# Patient Record
Sex: FEMALE | Race: WHITE | NOT HISPANIC OR LATINO | Employment: OTHER | URBAN - METROPOLITAN AREA
[De-identification: names, ages, dates, MRNs, and addresses within clinical notes are randomized per-mention and may not be internally consistent; named-entity substitution may affect disease eponyms.]

---

## 2017-01-09 ENCOUNTER — TRANSCRIBE ORDERS (OUTPATIENT)
Dept: LAB | Facility: CLINIC | Age: 67
End: 2017-01-09

## 2017-01-09 ENCOUNTER — APPOINTMENT (OUTPATIENT)
Dept: LAB | Facility: CLINIC | Age: 67
End: 2017-01-09
Payer: COMMERCIAL

## 2017-01-09 DIAGNOSIS — E78.5 HYPERLIPIDEMIA, UNSPECIFIED HYPERLIPIDEMIA TYPE: ICD-10-CM

## 2017-01-09 DIAGNOSIS — E55.9 VITAMIN D DEFICIENCY, UNSPECIFIED: ICD-10-CM

## 2017-01-09 DIAGNOSIS — I10 UNSPECIFIED ESSENTIAL HYPERTENSION: ICD-10-CM

## 2017-01-09 DIAGNOSIS — I10 UNSPECIFIED ESSENTIAL HYPERTENSION: Primary | ICD-10-CM

## 2017-01-09 LAB
25(OH)D3 SERPL-MCNC: 13.9 NG/ML (ref 30–100)
ALBUMIN SERPL BCP-MCNC: 3.8 G/DL (ref 3.5–5)
ALP SERPL-CCNC: 81 U/L (ref 46–116)
ALT SERPL W P-5'-P-CCNC: 27 U/L (ref 12–78)
ANION GAP SERPL CALCULATED.3IONS-SCNC: 8 MMOL/L (ref 4–13)
AST SERPL W P-5'-P-CCNC: 15 U/L (ref 5–45)
BILIRUB SERPL-MCNC: 0.58 MG/DL (ref 0.2–1)
BUN SERPL-MCNC: 15 MG/DL (ref 5–25)
CALCIUM SERPL-MCNC: 8.8 MG/DL (ref 8.3–10.1)
CHLORIDE SERPL-SCNC: 107 MMOL/L (ref 100–108)
CHOLEST SERPL-MCNC: 331 MG/DL (ref 50–200)
CO2 SERPL-SCNC: 28 MMOL/L (ref 21–32)
CREAT SERPL-MCNC: 0.74 MG/DL (ref 0.6–1.3)
ERYTHROCYTE [DISTWIDTH] IN BLOOD BY AUTOMATED COUNT: 12.7 % (ref 11.6–15.1)
GFR SERPL CREATININE-BSD FRML MDRD: >60 ML/MIN/1.73SQ M
GLUCOSE SERPL-MCNC: 87 MG/DL (ref 65–140)
HCT VFR BLD AUTO: 43.4 % (ref 34.8–46.1)
HDLC SERPL-MCNC: 48 MG/DL (ref 40–60)
HGB BLD-MCNC: 14.1 G/DL (ref 11.5–15.4)
LDLC SERPL CALC-MCNC: 254 MG/DL (ref 0–100)
MCH RBC QN AUTO: 30.3 PG (ref 26.8–34.3)
MCHC RBC AUTO-ENTMCNC: 32.5 G/DL (ref 31.4–37.4)
MCV RBC AUTO: 93 FL (ref 82–98)
PLATELET # BLD AUTO: 266 THOUSANDS/UL (ref 149–390)
PMV BLD AUTO: 11.1 FL (ref 8.9–12.7)
POTASSIUM SERPL-SCNC: 4.3 MMOL/L (ref 3.5–5.3)
PROT SERPL-MCNC: 7.3 G/DL (ref 6.4–8.2)
RBC # BLD AUTO: 4.66 MILLION/UL (ref 3.81–5.12)
SODIUM SERPL-SCNC: 143 MMOL/L (ref 136–145)
TRIGL SERPL-MCNC: 145 MG/DL
TSH SERPL DL<=0.05 MIU/L-ACNC: 3.15 UIU/ML (ref 0.36–3.74)
WBC # BLD AUTO: 6.8 THOUSAND/UL (ref 4.31–10.16)

## 2017-01-09 PROCEDURE — 80061 LIPID PANEL: CPT

## 2017-01-09 PROCEDURE — 84443 ASSAY THYROID STIM HORMONE: CPT

## 2017-01-09 PROCEDURE — 80053 COMPREHEN METABOLIC PANEL: CPT

## 2017-01-09 PROCEDURE — 36415 COLL VENOUS BLD VENIPUNCTURE: CPT

## 2017-01-09 PROCEDURE — 82306 VITAMIN D 25 HYDROXY: CPT

## 2017-01-09 PROCEDURE — 85027 COMPLETE CBC AUTOMATED: CPT

## 2017-01-13 ENCOUNTER — ALLSCRIPTS OFFICE VISIT (OUTPATIENT)
Dept: OTHER | Facility: OTHER | Age: 67
End: 2017-01-13

## 2017-04-19 ENCOUNTER — APPOINTMENT (OUTPATIENT)
Dept: LAB | Facility: CLINIC | Age: 67
End: 2017-04-19
Payer: COMMERCIAL

## 2017-04-19 ENCOUNTER — TRANSCRIBE ORDERS (OUTPATIENT)
Dept: LAB | Facility: CLINIC | Age: 67
End: 2017-04-19

## 2017-04-19 DIAGNOSIS — E78.5 OTHER AND UNSPECIFIED HYPERLIPIDEMIA: ICD-10-CM

## 2017-04-19 DIAGNOSIS — M25.562 LEFT KNEE PAIN, UNSPECIFIED CHRONICITY: Primary | ICD-10-CM

## 2017-04-19 LAB
CHOLEST SERPL-MCNC: 279 MG/DL (ref 50–200)
HDLC SERPL-MCNC: 54 MG/DL (ref 40–60)
LDLC SERPL CALC-MCNC: 206 MG/DL (ref 0–100)
TRIGL SERPL-MCNC: 93 MG/DL

## 2017-04-19 PROCEDURE — 36415 COLL VENOUS BLD VENIPUNCTURE: CPT

## 2017-04-19 PROCEDURE — 80061 LIPID PANEL: CPT

## 2017-04-20 ENCOUNTER — GENERIC CONVERSION - ENCOUNTER (OUTPATIENT)
Dept: OTHER | Facility: OTHER | Age: 67
End: 2017-04-20

## 2017-05-05 ENCOUNTER — ALLSCRIPTS OFFICE VISIT (OUTPATIENT)
Dept: OTHER | Facility: OTHER | Age: 67
End: 2017-05-05

## 2017-05-15 ENCOUNTER — TRANSCRIBE ORDERS (OUTPATIENT)
Dept: LAB | Facility: CLINIC | Age: 67
End: 2017-05-15

## 2017-05-15 ENCOUNTER — APPOINTMENT (OUTPATIENT)
Dept: LAB | Facility: CLINIC | Age: 67
End: 2017-05-15
Payer: COMMERCIAL

## 2017-05-15 DIAGNOSIS — E55.9 UNSPECIFIED VITAMIN D DEFICIENCY: ICD-10-CM

## 2017-05-15 DIAGNOSIS — E55.9 UNSPECIFIED VITAMIN D DEFICIENCY: Primary | ICD-10-CM

## 2017-05-15 LAB — 25(OH)D3 SERPL-MCNC: 29.1 NG/ML (ref 30–100)

## 2017-05-15 PROCEDURE — 36415 COLL VENOUS BLD VENIPUNCTURE: CPT

## 2017-05-15 PROCEDURE — 82306 VITAMIN D 25 HYDROXY: CPT

## 2017-05-17 ENCOUNTER — GENERIC CONVERSION - ENCOUNTER (OUTPATIENT)
Dept: OTHER | Facility: OTHER | Age: 67
End: 2017-05-17

## 2017-09-07 ENCOUNTER — TRANSCRIBE ORDERS (OUTPATIENT)
Dept: LAB | Facility: CLINIC | Age: 67
End: 2017-09-07

## 2017-09-07 ENCOUNTER — APPOINTMENT (OUTPATIENT)
Dept: LAB | Facility: CLINIC | Age: 67
End: 2017-09-07
Payer: MEDICARE

## 2017-09-07 DIAGNOSIS — E78.5 OTHER AND UNSPECIFIED HYPERLIPIDEMIA: ICD-10-CM

## 2017-09-07 DIAGNOSIS — E78.5 OTHER AND UNSPECIFIED HYPERLIPIDEMIA: Primary | ICD-10-CM

## 2017-09-07 LAB
ALBUMIN SERPL BCP-MCNC: 3.6 G/DL (ref 3.5–5)
ALP SERPL-CCNC: 81 U/L (ref 46–116)
ALT SERPL W P-5'-P-CCNC: 37 U/L (ref 12–78)
ANION GAP SERPL CALCULATED.3IONS-SCNC: 8 MMOL/L (ref 4–13)
AST SERPL W P-5'-P-CCNC: 22 U/L (ref 5–45)
BILIRUB SERPL-MCNC: 0.63 MG/DL (ref 0.2–1)
BUN SERPL-MCNC: 18 MG/DL (ref 5–25)
CALCIUM SERPL-MCNC: 8.8 MG/DL (ref 8.3–10.1)
CHLORIDE SERPL-SCNC: 105 MMOL/L (ref 100–108)
CHOLEST SERPL-MCNC: 247 MG/DL (ref 50–200)
CO2 SERPL-SCNC: 28 MMOL/L (ref 21–32)
CREAT SERPL-MCNC: 0.79 MG/DL (ref 0.6–1.3)
GFR SERPL CREATININE-BSD FRML MDRD: 79 ML/MIN/1.73SQ M
GLUCOSE P FAST SERPL-MCNC: 66 MG/DL (ref 65–99)
HDLC SERPL-MCNC: 58 MG/DL (ref 40–60)
LDLC SERPL CALC-MCNC: 170 MG/DL (ref 0–100)
POTASSIUM SERPL-SCNC: 4.3 MMOL/L (ref 3.5–5.3)
PROT SERPL-MCNC: 7.5 G/DL (ref 6.4–8.2)
SODIUM SERPL-SCNC: 141 MMOL/L (ref 136–145)
TRIGL SERPL-MCNC: 93 MG/DL

## 2017-09-07 PROCEDURE — 36415 COLL VENOUS BLD VENIPUNCTURE: CPT

## 2017-09-07 PROCEDURE — 80053 COMPREHEN METABOLIC PANEL: CPT

## 2017-09-07 PROCEDURE — 80061 LIPID PANEL: CPT

## 2017-09-08 ENCOUNTER — GENERIC CONVERSION - ENCOUNTER (OUTPATIENT)
Dept: OTHER | Facility: OTHER | Age: 67
End: 2017-09-08

## 2017-09-12 ENCOUNTER — ALLSCRIPTS OFFICE VISIT (OUTPATIENT)
Dept: OTHER | Facility: OTHER | Age: 67
End: 2017-09-12

## 2017-09-21 ENCOUNTER — TRANSCRIBE ORDERS (OUTPATIENT)
Dept: ADMINISTRATIVE | Facility: HOSPITAL | Age: 67
End: 2017-09-21

## 2017-09-21 DIAGNOSIS — M81.0 SENILE OSTEOPOROSIS: Primary | ICD-10-CM

## 2017-09-27 ENCOUNTER — HOSPITAL ENCOUNTER (OUTPATIENT)
Dept: RADIOLOGY | Age: 67
Discharge: HOME | End: 2017-09-27
Payer: MEDICARE

## 2017-09-27 DIAGNOSIS — M81.0 SENILE OSTEOPOROSIS: ICD-10-CM

## 2017-09-27 PROCEDURE — 77080 DXA BONE DENSITY AXIAL: CPT

## 2017-10-01 ENCOUNTER — GENERIC CONVERSION - ENCOUNTER (OUTPATIENT)
Dept: OTHER | Facility: OTHER | Age: 67
End: 2017-10-01

## 2017-10-23 ENCOUNTER — HOSPITAL ENCOUNTER (OUTPATIENT)
Dept: RADIOLOGY | Age: 67
Discharge: HOME | End: 2017-10-23
Payer: MEDICARE

## 2017-10-23 DIAGNOSIS — Z12.31 ENCOUNTER FOR SCREENING MAMMOGRAM FOR MALIGNANT NEOPLASM OF BREAST: ICD-10-CM

## 2017-10-23 PROCEDURE — G0202 SCR MAMMO BI INCL CAD: HCPCS

## 2018-01-10 NOTE — RESULT NOTES
Verified Results  (1) VITAMIN D 25-HYDROXY [id number] 08:28AM Nadege Brunson     Test Name Result Flag Reference   VIT D 25-HYDROX 29 1 ng/mL L 30 0-100 0   This assay is a certified procedure of the CDC Vitamin D Standardization Certification Program (VDSCP)     Deficiency <20ng/ml   Insufficiency 20-30ng/ml   Sufficient  ng/ml     *Patients undergoing fluorescein dye angiography may retain small amounts of fluorescein in the body for 48-72 hours post procedure  Samples containing fluorescein can produce falsely elevated Vitamin D values  If the patient had this procedure, a specimen should be resubmitted post fluorescein clearance

## 2018-01-10 NOTE — RESULT NOTES
Verified Results  (1) LIPID PANEL FASTING W DIRECT LDL REFLEX [id number] 07:07AM Nadege Brunson     Test Name Result Flag Reference   CHOLESTEROL 279 mg/dL H    LDL CHOLESTEROL CALCULATED 206 mg/dL H 0-100   This is a fasting blood test  Water,black tea or black  coffee only after 9:00pm the night before test  Drink 2 glasses of water the morning of test         Triglyceride:         Normal              <150 mg/dl       Borderline High    150-199 mg/dl       High               200-499 mg/dl       Very High          >499 mg/dl  Cholesterol:         Desirable        <200 mg/dl      Borderline High  200-239 mg/dl      High             >239 mg/dl  HDL Cholesterol:        High    >59 mg/dL      Low     <41 mg/dL  LDL Cholesterol:        Optimal          <100 mg/dl        Near Optimal     100-129 mg/dl        Above Optimal          Borderline High   130-159 mg/dl          High              160-189 mg/dl          Very High        >189 mg/dl  LDL CALCULATED:    This screening LDL is a calculated result  It does not have the accuracy of the Direct Measured LDL in the monitoring of patients with hyperlipidemia and/or statin therapy  Direct Measure LDL (HTT536) must be ordered separately in these patients  TRIGLYCERIDES 93 mg/dL  <=150   Specimen collection should occur prior to N-Acetylcysteine or Metamizole administration due to the potential for falsely depressed results  HDL,DIRECT 54 mg/dL  40-60   Specimen collection should occur prior to Metamizole administration due to the potential for falsely depressed results

## 2018-01-12 NOTE — MISCELLANEOUS
Message   Recorded as Task   Date: 04/20/2017 09:37 AM, Created By: Gregoria Dewitt   Task Name: Follow Up   Assigned To: Nadege Brunson   Regarding Patient: Cedric Lopez, Status: Active   Comment:    Sarah Ramirez - 20 Apr 2017 9:37 AM     TASK CREATED  Spoke to pt and gave results  Pt states that she has missed some doses of Zetia and is not interested in starting a statin at present  She will discuss further at appt with you    Thanks KAYY        Signatures   Electronically signed by : Britta Rinaldi MD; Apr 20 2017  9:41AM EST                       (Author)

## 2018-01-12 NOTE — RESULT NOTES
Verified Results  * DXA BONE DENSITY SPINE HIP AND PELVIS [id number] 06:36AM Nadege Brunson     Test Name Result Flag Reference   DXA BONE DENSITY SPINE HIP AND PELVIS (Report)     CENTRAL DXA SCAN     CLINICAL HISTORY:  74year old post-menopausal  female with history of melanoma  The patient walks and takes calcium and vitamin D supplements  TECHNIQUE: Bone densitometry was performed using a Hologic Horizon A bone densitometer  Regions of interest appear properly placed  There are no obvious fractures or other confounding variables which could limit the study  COMPARISON: None  RESULTS:    LUMBAR SPINE: L1-L4:   BMD 0 883 gm/cm2   T-score -1 5   Z-score 0 3     LEFT TOTAL HIP:   BMD 0 845 gm/cm2   T-score -0 8   Z-score 0 4     LEFT FEMORAL NECK:   BMD 0 698 gm/cm2   T-score -1 4   Z-score 0 2             IMPRESSION:   1  Based on the [facility] classification, the T-score of -1 5 in the lumbar spine is consistent with low bone mineral density  2  Any secondary causes of low bone mineral density should be excluded prior to treatment, if clinically indicated  3  A daily intake of at least 1200 mg calcium and 800 to 1000 IU of Vitamin D, as well as weight bearing and muscle strengthening exercise, fall prevention and avoidance of tobacco and excessive alcohol intake as basic preventive measures are suggested  4  Repeat DXA in 18 - 24 months, on the same machine, as clinically indicated  The 10 year risk of hip fracture is 0 7%, with the 10 year risk of major osteoporotic fracture being 8 2%, as calculated by the [facility] fracture risk assessment tool (FRAX)  The current NOF guidelines recommend treating patients with FRAX 10 year risk score   of >3% for hip fracture and >20% for major osteoporotic fracture        WHO CLASSIFICATION:   Normal (a T-score of -1 0 or higher)   Low bone mineral density (a T-score of less than -1 0 but higher than -2 5)   Osteoporosis (a T-score of -2 5 or less)   Severe osteoporosis (a T-score of -2 5 or less with a fragility fracture)             Workstation performed: [id number]     Signed by:   Gino Ty MD   9/27/17

## 2018-01-13 VITALS
WEIGHT: 183.38 LBS | HEART RATE: 78 BPM | DIASTOLIC BLOOD PRESSURE: 64 MMHG | TEMPERATURE: 96.2 F | SYSTOLIC BLOOD PRESSURE: 116 MMHG | HEIGHT: 65 IN | BODY MASS INDEX: 30.55 KG/M2

## 2018-01-14 VITALS
DIASTOLIC BLOOD PRESSURE: 68 MMHG | HEIGHT: 65 IN | SYSTOLIC BLOOD PRESSURE: 114 MMHG | HEART RATE: 72 BPM | RESPIRATION RATE: 14 BRPM | TEMPERATURE: 96.8 F | BODY MASS INDEX: 30.7 KG/M2 | WEIGHT: 184.25 LBS

## 2018-01-14 VITALS
HEIGHT: 65 IN | WEIGHT: 188.5 LBS | RESPIRATION RATE: 16 BRPM | SYSTOLIC BLOOD PRESSURE: 122 MMHG | HEART RATE: 64 BPM | TEMPERATURE: 96.7 F | DIASTOLIC BLOOD PRESSURE: 80 MMHG | BODY MASS INDEX: 31.4 KG/M2

## 2018-01-15 NOTE — RESULT NOTES
Message   please let her know there was no dvt  i think it is her knee as we discussed  i will put in rx for naproxen to take twice daily for 7 days  also recommend rest, ice  if no improvement in 7-10 days, she should call and will give order for PT and xray  thanks     Verified Results  VAS LOWER LIMB VENOUS DUPLEX STUDY, UNILATERAL/LIMITED [id number] 02:23PM Narciso Jacob Order Number: [id number]    - Patient Instructions: To schedule this appointment, please contact Central Scheduling at [phone]  Test Name Result Flag Reference   VAS LOWER LIMB VENOUS DUPLEX STUDY, UNILATERAL/LIMITED (Report)     [facility] REPORT   CLINICAL:   Indications: Left Limb Pain [M79 609]  Left posterior calf pain with weight bearing since 8/13/2016  Clinical:   Left Lower Limb   There is complaint of pain  FINDINGS:      Segment Right      Left          Impression    Impression       CFV   Normal (Patent) Normal (Patent)             CONCLUSION:   Impression:   RIGHT LOWER LIMB LIMITED: NORMAL   Evaluation shows no evidence of thrombus in the common femoral vein  Doppler evaluation shows a normal response to augmentation maneuvers  LEFT LOWER LIMB: NORMAL   No evidence of acute or chronic deep vein thrombosis   No evidence of superficial thrombophlebitis noted  Doppler evaluation shows a normal response to augmentation maneuvers  Popliteal, posterior tibial and anterior tibial arterial Doppler waveforms are   triphasic  Tech Note: Preliminary report given to Lucas Martin at Dr Ohara Miller office   AB      SIGNATURE:   Electronically Signed by: Tierra Mascorro on 2016-08-17 04:06:20 PM       Plan  Acute pain of left knee    · Naproxen 500 MG Oral Tablet; TAKE 1 TABLET TWICE DAILY

## 2018-01-16 NOTE — RESULT NOTES
Verified Results  (1) COMPREHENSIVE METABOLIC PANEL [id number] 66:72WM Nadege Brunson     Test Name Result Flag Reference   SODIUM 141 mmol/L  136-145   POTASSIUM 4 3 mmol/L  3 5-5 3   CHLORIDE 105 mmol/L  100-108   CARBON DIOXIDE 28 mmol/L  21-32   ANION GAP (CALC) 8 mmol/L  4-13   BLOOD UREA NITROGEN 18 mg/dL  5-25   CREATININE 0 79 mg/dL  0 60-1 30   Standardized to IDMS reference method   CALCIUM 8 8 mg/dL  8 3-10 1   BILI, TOTAL 0 63 mg/dL  0 20-1 00   ALK PHOSPHATAS 81 U/L     ALT (SGPT) 37 U/L  12-78   Specimen collection should occur prior to Sulfasalazine and/or Sulfapyridine administration due to the potential for falsely depressed results  AST(SGOT) 22 U/L  5-45   Specimen collection should occur prior to Sulfasalazine administration due to the potential for falsely depressed results  ALBUMIN 3 6 g/dL  3 5-5 0   TOTAL PROTEIN 7 5 g/dL  6 4-8 2   eGFR 79 ml/min/1 73sq [facility] Disease Education Program recommendations are as follows:  GFR calculation is accurate only with a steady state creatinine  Chronic Kidney disease less than 60 ml/min/1 73 sq  meters  Kidney failure less than 15 ml/min/1 73 sq  meters  GLUCOSE FASTING 66 mg/dL  65-99   Specimen collection should occur prior to Sulfasalazine administration due to the potential for falsely depressed results  Specimen collection should occur prior to Sulfapyridine administration due to the potential for falsely elevated results  (1) LIPID PANEL FASTING W DIRECT LDL REFLEX [id number] 07:18AM Nadege Brunson     Test Name Result Flag Reference   CHOLESTEROL 247 mg/dL H    LDL CHOLESTEROL CALCULATED 170 mg/dL H 0-100   This is a patient instruction:  This is a fasting test  Water, black tea or black coffee only after 9:00pm the night before the test  Drink 2 glasses of water the morning of the test         Triglyceride:        Normal ??? ??? ??? ??? ??? ??? ??? <150 mg/dl   ??? ??? ???Borderline High ??? ??? 150-199 mg/dl ??? ??? ? ?? High ??? ??? ??? ??? ??? ??? ??? 200-499 mg/dl   ??? ??? ? ??Very High ??? ??? ??? ??? ??? >499 mg/dl      Cholesterol:       Desirable ??? ??? ??? ??? <200 mg/dl   ??? ??? Borderline High ??? 200-239 mg/dl   ??? ??? High ??? ??? ??? ??? ??? ??? >239 mg/dl      HDL Cholesterol:       High ??? ???>59 mg/dL   ??? ??? Low ??? ??? <41 mg/dL      HDL Cholesterol:       High ??? ???>59 mg/dL   ??? ??? Low ??? ??? <41 mg/dL      This screening LDL is a calculated result  It does not have the accuracy of the Direct Measured LDL in the monitoring of patients with hyperlipidemia and/or statin therapy  Direct Measure LDL (AKO313) must be ordered separately in these patients  TRIGLYCERIDES 93 mg/dL  <=150   Specimen collection should occur prior to N-Acetylcysteine or Metamizole administration due to the potential for falsely depressed results  HDL,DIRECT 58 mg/dL  40-60   Specimen collection should occur prior to Metamizole administration due to the potential for falsley depressed results

## 2018-03-16 ENCOUNTER — TRANSCRIBE ORDERS (OUTPATIENT)
Dept: LAB | Facility: CLINIC | Age: 68
End: 2018-03-16

## 2018-03-16 ENCOUNTER — APPOINTMENT (OUTPATIENT)
Dept: LAB | Facility: CLINIC | Age: 68
End: 2018-03-16
Payer: MEDICARE

## 2018-03-16 DIAGNOSIS — E55.9 VITAMIN D DEFICIENCY: ICD-10-CM

## 2018-03-16 DIAGNOSIS — E78.5 HYPERLIPIDEMIA, UNSPECIFIED HYPERLIPIDEMIA TYPE: ICD-10-CM

## 2018-03-16 DIAGNOSIS — E78.5 HYPERLIPIDEMIA, UNSPECIFIED HYPERLIPIDEMIA TYPE: Primary | ICD-10-CM

## 2018-03-16 DIAGNOSIS — R53.83 FATIGUE, UNSPECIFIED TYPE: ICD-10-CM

## 2018-03-16 DIAGNOSIS — E55.9 AVITAMINOSIS D: ICD-10-CM

## 2018-03-16 LAB
25(OH)D3 SERPL-MCNC: 24.8 NG/ML (ref 30–100)
ALBUMIN SERPL BCP-MCNC: 3.4 G/DL (ref 3.5–5)
ALP SERPL-CCNC: 68 U/L (ref 46–116)
ALT SERPL W P-5'-P-CCNC: 33 U/L (ref 12–78)
ANION GAP SERPL CALCULATED.3IONS-SCNC: 5 MMOL/L (ref 4–13)
AST SERPL W P-5'-P-CCNC: 24 U/L (ref 5–45)
BASOPHILS # BLD AUTO: 0.03 THOUSANDS/ΜL (ref 0–0.1)
BASOPHILS NFR BLD AUTO: 1 % (ref 0–1)
BILIRUB SERPL-MCNC: 0.41 MG/DL (ref 0.2–1)
BUN SERPL-MCNC: 15 MG/DL (ref 5–25)
CALCIUM SERPL-MCNC: 8.4 MG/DL (ref 8.3–10.1)
CHLORIDE SERPL-SCNC: 108 MMOL/L (ref 100–108)
CHOLEST SERPL-MCNC: 227 MG/DL (ref 50–200)
CO2 SERPL-SCNC: 27 MMOL/L (ref 21–32)
CREAT SERPL-MCNC: 0.79 MG/DL (ref 0.6–1.3)
EOSINOPHIL # BLD AUTO: 0.43 THOUSAND/ΜL (ref 0–0.61)
EOSINOPHIL NFR BLD AUTO: 8 % (ref 0–6)
ERYTHROCYTE [DISTWIDTH] IN BLOOD BY AUTOMATED COUNT: 12.5 % (ref 11.6–15.1)
GFR SERPL CREATININE-BSD FRML MDRD: 79 ML/MIN/1.73SQ M
GLUCOSE P FAST SERPL-MCNC: 84 MG/DL (ref 65–99)
HCT VFR BLD AUTO: 40.2 % (ref 34.8–46.1)
HDLC SERPL-MCNC: 48 MG/DL (ref 40–60)
HGB BLD-MCNC: 13.1 G/DL (ref 11.5–15.4)
LDLC SERPL CALC-MCNC: 162 MG/DL (ref 0–100)
LYMPHOCYTES # BLD AUTO: 1.18 THOUSANDS/ΜL (ref 0.6–4.47)
LYMPHOCYTES NFR BLD AUTO: 21 % (ref 14–44)
MCH RBC QN AUTO: 30.3 PG (ref 26.8–34.3)
MCHC RBC AUTO-ENTMCNC: 32.6 G/DL (ref 31.4–37.4)
MCV RBC AUTO: 93 FL (ref 82–98)
MONOCYTES # BLD AUTO: 0.61 THOUSAND/ΜL (ref 0.17–1.22)
MONOCYTES NFR BLD AUTO: 11 % (ref 4–12)
NEUTROPHILS # BLD AUTO: 3.31 THOUSANDS/ΜL (ref 1.85–7.62)
NEUTS SEG NFR BLD AUTO: 59 % (ref 43–75)
NRBC BLD AUTO-RTO: 0 /100 WBCS
PLATELET # BLD AUTO: 264 THOUSANDS/UL (ref 149–390)
PMV BLD AUTO: 11.3 FL (ref 8.9–12.7)
POTASSIUM SERPL-SCNC: 3.7 MMOL/L (ref 3.5–5.3)
PROT SERPL-MCNC: 7 G/DL (ref 6.4–8.2)
RBC # BLD AUTO: 4.32 MILLION/UL (ref 3.81–5.12)
SODIUM SERPL-SCNC: 140 MMOL/L (ref 136–145)
TRIGL SERPL-MCNC: 84 MG/DL
TSH SERPL DL<=0.05 MIU/L-ACNC: 2.36 UIU/ML (ref 0.36–3.74)
WBC # BLD AUTO: 5.57 THOUSAND/UL (ref 4.31–10.16)

## 2018-03-16 PROCEDURE — 36415 COLL VENOUS BLD VENIPUNCTURE: CPT

## 2018-03-16 PROCEDURE — 80061 LIPID PANEL: CPT

## 2018-03-16 PROCEDURE — 85025 COMPLETE CBC W/AUTO DIFF WBC: CPT

## 2018-03-16 PROCEDURE — 84443 ASSAY THYROID STIM HORMONE: CPT

## 2018-03-16 PROCEDURE — 82306 VITAMIN D 25 HYDROXY: CPT

## 2018-03-16 PROCEDURE — 80053 COMPREHEN METABOLIC PANEL: CPT

## 2018-03-20 ENCOUNTER — OFFICE VISIT (OUTPATIENT)
Dept: FAMILY MEDICINE CLINIC | Facility: CLINIC | Age: 68
End: 2018-03-20
Payer: MEDICARE

## 2018-03-20 VITALS
DIASTOLIC BLOOD PRESSURE: 72 MMHG | BODY MASS INDEX: 31.39 KG/M2 | HEIGHT: 65 IN | WEIGHT: 188.4 LBS | SYSTOLIC BLOOD PRESSURE: 118 MMHG | RESPIRATION RATE: 16 BRPM | TEMPERATURE: 96.3 F | HEART RATE: 64 BPM

## 2018-03-20 DIAGNOSIS — D03.9 MELANOMA IN SITU, UNSPECIFIED SITE (HCC): ICD-10-CM

## 2018-03-20 DIAGNOSIS — E78.5 HYPERLIPIDEMIA, UNSPECIFIED HYPERLIPIDEMIA TYPE: Primary | ICD-10-CM

## 2018-03-20 DIAGNOSIS — E55.9 VITAMIN D DEFICIENCY: ICD-10-CM

## 2018-03-20 DIAGNOSIS — M79.644 FINGER PAIN, RIGHT: ICD-10-CM

## 2018-03-20 DIAGNOSIS — Z00.00 ROUTINE HEALTH MAINTENANCE: ICD-10-CM

## 2018-03-20 PROCEDURE — 99214 OFFICE O/P EST MOD 30 MIN: CPT | Performed by: FAMILY MEDICINE

## 2018-03-20 RX ORDER — EZETIMIBE 10 MG/1
1 TABLET ORAL DAILY
COMMUNITY
Start: 2015-02-26 | End: 2018-10-03 | Stop reason: SDUPTHER

## 2018-03-20 RX ORDER — ATORVASTATIN CALCIUM 10 MG/1
TABLET, FILM COATED ORAL
Refills: 3 | COMMUNITY
Start: 2018-01-15 | End: 2018-10-03 | Stop reason: SDUPTHER

## 2018-03-20 NOTE — PROGRESS NOTES
FAMILY PRACTICE OFFICE VISIT       NAME: Momo Grant  AGE: 74 y o  SEX: female       : 1950        MRN: [id number]    DATE: 3/21/2018  TIME: 12:48 PM    Assessment and Plan     Problem List Items Addressed This Visit     Hyperlipidemia - Primary     Recent lipid panel improved  She has been taking atorvastatin 4 times a week until a few weeks ago when she started to develop upper arm pain after she shovel snow and started to take it 3 times a week  She will try and start taking it 4 times a week again  She will take 5 mg 4 times weekly  Continue with lifestyle modifications  Patient also continues to walk 2 miles twice weekly  Have encouraged increase to 3 to 4 times a week  Continue with Zetia  Will continue to monitor lipid panel  Relevant Medications    atorvastatin (LIPITOR) 10 mg tablet    ezetimibe (ZETIA) 10 mg tablet    Other Relevant Orders    Lipid Panel with Direct LDL reflex    Comprehensive metabolic panel    Finger pain, right       Patient has had a 1 day history of pain over 3rd digit of right hand and decreased range of motion  Patient has pain over right 3rd digit PIP joint when she bends it  Denies any obvious injury  Will obtain x-ray and refer to Orthopedics  Patient is agreeable with this plan  Patient is right-hand dominant  Denies any tingling, numbness, weakness  Relevant Orders    XR hand 3+ vw right    Ambulatory referral to Orthopedic Surgery    Melanoma in situ [facility])       Patient has 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, diagnosed in   She follows with dermatology, Dr Rodriguez Sa  She has follow-up appointment in July  She regularly wears sunscreen  Vitamin D deficiency       Patient takes vitamin-D 41273 units once weekly  Have asked patient to at 1000 iu a day as well  Patient is agreeable with this  Routine health maintenance       Up-to-date with mammogram last October    Up-to-date with DEXA scan last September  Continue with muscle strengthening weight-bearing exercises as well as vitamin-D and calcium supplementation  Up-to-date with colonoscopy on 09/2015 with recommended repeat in 5 years  Up-to-date with Prevnar and Tdap  Will administer Pneumovax in 6 months  There are no Patient Instructions on file for this visit  Chief Complaint     Chief Complaint   Patient presents with    Follow-up     Patient is here for a followup to review recent blood work results   Hand Injury     Patient c/o trouble closing her hand x's 1+ days  History of Present Illness     HPI   Patient is here for routine follow-up and discuss recent blood work results  She has been taking atorvastatin 4 times a week until a few weeks ago when she started to develop upper arm pain after she shovel snow and started to take it 3 times a week  She will try and start taking it 4 times a week again  Walks 2 miles 2x/week  Taking vit d 10,000 once a week   Has an appt with dr stafford in July as patient has a history of melanoma in situ  UTD with Colonoscopy 9/2015, recommended repeat in 5 yrs  Yesterday developed shooting pain in right middle finger radiating to forearm and since then unable to close fist    Has pain/soreness when she closes her fist over her middle knuckle  Denies any injury  Patient is Right handed  Denies tingling and numbness     Review of Systems   Review of Systems   Constitutional: Negative for unexpected weight change  Eyes: Negative for visual disturbance  Respiratory: Negative for shortness of breath  Cardiovascular: Negative  Negative for chest pain, palpitations and leg swelling  Gastrointestinal: Negative for abdominal pain and constipation  Genitourinary: Negative for dysuria  Musculoskeletal:          Right 3rd digit decreased range of motion   Neurological: Negative for numbness  Psychiatric/Behavioral: Negative for dysphoric mood  Active Problem List     Patient Active Problem List   Diagnosis    Hyperlipidemia    Finger pain, right    Melanoma in situ ([address] )    Vitamin D deficiency    Routine health maintenance       Past Medical History:  Past Medical History:   Diagnosis Date    Nephrolithiasis     Skin cancer, basal cell     right arm       Past Surgical History:  No past surgical history on file  Family History:  Family History   Problem Relation Age of Onset    Hyperlipidemia Mother     Colon cancer Family     Hypertension Family     Cancer Family      laryngeal       Social History:  Social History     Social History    Marital status: /Civil Union     Spouse name: N/A    Number of children: N/A    Years of education: N/A     Occupational History    Not on file  Social History Main Topics    Smoking status: Never Smoker    Smokeless tobacco: Never Used    Alcohol use Yes      Comment: social    Drug use: No    Sexual activity: Not on file     Other Topics Concern    Not on file     Social History Narrative    No narrative on file     I have reviewed the patient's medical history in detail; there are no changes to the history as noted in the electronic medical record  Objective     Vitals:    03/20/18 0731   BP: 118/72   Pulse: 64   Resp: 16   Temp: (!) 96 3 °F (35 7 °C)     Wt Readings from Last 3 Encounters:   03/20/18 85 5 kg (188 lb 6 4 oz)   09/12/17 85 5 kg (188 lb 8 oz)   05/05/17 83 6 kg (184 lb 4 oz)       Physical Exam   Constitutional: She is oriented to person, place, and time  She appears well-developed and well-nourished  HENT:   Head: Normocephalic and atraumatic  Mouth/Throat: Oropharynx is clear and moist     Tms intact and clear   Eyes: Conjunctivae and EOM are normal  Pupils are equal, round, and reactive to light  Neck: Normal range of motion  Neck supple  No thyromegaly present  Cardiovascular: Normal rate and regular rhythm  No murmur heard     No carotid bruits auscultated   Pulmonary/Chest: Effort normal and breath sounds normal    Abdominal: Soft  Bowel sounds are normal    Musculoskeletal: Normal range of motion  She exhibits no edema  Patient has decreased range of motion of 3rd digit of right hand  no erythema, warmth, edema noted  Lymphadenopathy:     She has no cervical adenopathy  Neurological: She is alert and oriented to person, place, and time  Skin: No rash noted  Psychiatric: She has a normal mood and affect  Nursing note and vitals reviewed        Pertinent Laboratory/Diagnostic Studies:  Lab Results   Component Value Date    GLUCOSE 87 01/09/2017    BUN 15 03/16/2018    CREATININE 0 79 03/16/2018    CALCIUM 8 4 03/16/2018     03/16/2018    K 3 7 03/16/2018    CO2 27 03/16/2018     03/16/2018     Lab Results   Component Value Date    ALT 33 03/16/2018    AST 24 03/16/2018    ALKPHOS 68 03/16/2018    BILITOT 0 41 03/16/2018       Lab Results   Component Value Date    WBC 5 57 03/16/2018    HGB 13 1 03/16/2018    HCT 40 2 03/16/2018    MCV 93 03/16/2018     03/16/2018       No results found for: TSH    Lab Results   Component Value Date    CHOL 227 (H) 03/16/2018     Lab Results   Component Value Date    TRIG 84 03/16/2018     Lab Results   Component Value Date    HDL 48 03/16/2018     Lab Results   Component Value Date    LDLCALC 162 (H) 03/16/2018     No results found for: HGBA1C    Results for orders placed or performed in visit on 03/16/18   Lipid Panel with Direct LDL reflex   Result Value Ref Range    Cholesterol 227 (H) 50 - 200 mg/dL    Triglycerides 84 <=150 mg/dL    HDL, Direct 48 40 - 60 mg/dL    LDL Calculated 162 (H) 0 - 100 mg/dL   Comprehensive metabolic panel   Result Value Ref Range    Sodium 140 136 - 145 mmol/L    Potassium 3 7 3 5 - 5 3 mmol/L    Chloride 108 100 - 108 mmol/L    CO2 27 21 - 32 mmol/L    Anion Gap 5 4 - 13 mmol/L    BUN 15 5 - 25 mg/dL    Creatinine 0 79 0 60 - 1 30 mg/dL    Glucose, Fasting 84 65 - 99 mg/dL    Calcium 8 4 8 3 - 10 1 mg/dL    AST 24 5 - 45 U/L    ALT 33 12 - 78 U/L    Alkaline Phosphatase 68 46 - 116 U/L    Total Protein 7 0 6 4 - 8 2 g/dL    Albumin 3 4 (L) 3 5 - 5 0 g/dL    Total Bilirubin 0 41 0 20 - 1 00 mg/dL    eGFR 79 ml/min/1 73sq m   TSH, 3rd generation with T4 reflex   Result Value Ref Range    TSH 3RD GENERATON 2 360 0 358 - 3 740 uIU/mL   CBC and differential   Result Value Ref Range    WBC 5 57 4 31 - 10 16 Thousand/uL    RBC 4 32 3 81 - 5 12 Million/uL    Hemoglobin 13 1 11 5 - 15 4 g/dL    Hematocrit 40 2 34 8 - 46 1 %    MCV 93 82 - 98 fL    MCH 30 3 26 8 - 34 3 pg    MCHC 32 6 31 4 - 37 4 g/dL    RDW 12 5 11 6 - 15 1 %    MPV 11 3 8 9 - 12 7 fL    Platelets 263 508 - 154 Thousands/uL    nRBC 0 /100 WBCs    Neutrophils Relative 59 43 - 75 %    Lymphocytes Relative 21 14 - 44 %    Monocytes Relative 11 4 - 12 %    Eosinophils Relative 8 (H) 0 - 6 %    Basophils Relative 1 0 - 1 %    Neutrophils Absolute 3 31 1 85 - 7 62 Thousands/µL    Lymphocytes Absolute 1 18 0 60 - 4 47 Thousands/µL    Monocytes Absolute 0 61 0 17 - 1 22 Thousand/µL    Eosinophils Absolute 0 43 0 00 - 0 61 Thousand/µL    Basophils Absolute 0 03 0 00 - 0 10 Thousands/µL   Vitamin D 25 hydroxy   Result Value Ref Range    Vit D, 25-Hydroxy 24 8 (L) 30 0 - 100 0 ng/mL       Orders Placed This Encounter   Procedures    XR hand 3+ vw right    Lipid Panel with Direct LDL reflex    Comprehensive metabolic panel    Ambulatory referral to Orthopedic Surgery       ALLERGIES:  Allergies   Allergen Reactions    Ezetimibe-Simvastatin Headache    Pravastatin Myalgia    Rosuvastatin Myalgia    Simvastatin Other (See Comments)     Muscle Aches       Current Medications     Current Outpatient Prescriptions   Medication Sig Dispense Refill    atorvastatin (LIPITOR) 10 mg tablet TAKE ONE-HALF TABLET BY MOUTH ON MONDAY, WEDNESDAY, AND FRIDAY  3    Calcium-Magnesium-Vitamin D (CALCIUM MAGNESIUM PO) Take 1 tablet by mouth daily      cholecalciferol (VITAMIN D3) 02195 units capsule Take by mouth      Cyanocobalamin (B-12 PO) Take 1 tablet by mouth daily      ezetimibe (ZETIA) 10 mg tablet Take 1 tablet by mouth daily      FOLIC ACID PO Take 1 tablet by mouth daily      Multiple Vitamin (MULTIVITAMINS PO) Take 1 tablet by mouth daily       No current facility-administered medications for this visit  Health Maintenance   There are no preventive care reminders to display for this patient    Immunization History   Administered Date(s) Administered    Influenza TIV (IM) 09/13/2010, 01/13/2017    Pneumococcal Conjugate 13-Valent 09/12/2017    Td (adult), adsorbed 05/04/2004    Tdap 06/30/2014       Varghese Carpio MD

## 2018-03-21 ENCOUNTER — TRANSCRIBE ORDERS (OUTPATIENT)
Dept: RADIOLOGY | Facility: HOSPITAL | Age: 68
End: 2018-03-21

## 2018-03-21 ENCOUNTER — HOSPITAL ENCOUNTER (OUTPATIENT)
Dept: RADIOLOGY | Facility: HOSPITAL | Age: 68
Discharge: HOME | End: 2018-03-21
Payer: MEDICARE

## 2018-03-21 DIAGNOSIS — M79.644 FINGER PAIN, RIGHT: ICD-10-CM

## 2018-03-21 PROBLEM — D03.9 MELANOMA IN SITU (HCC): Status: ACTIVE | Noted: 2018-03-21

## 2018-03-21 PROBLEM — Z00.00 ROUTINE HEALTH MAINTENANCE: Status: ACTIVE | Noted: 2018-03-21

## 2018-03-21 PROBLEM — E55.9 VITAMIN D DEFICIENCY: Status: ACTIVE | Noted: 2018-03-21

## 2018-03-21 PROBLEM — E78.5 HYPERLIPIDEMIA: Status: ACTIVE | Noted: 2018-03-21

## 2018-03-21 PROCEDURE — 73130 X-RAY EXAM OF HAND: CPT

## 2018-03-21 NOTE — ASSESSMENT & PLAN NOTE
Patient has had a 1 day history of pain over 3rd digit of right hand and decreased range of motion  Patient has pain over right 3rd digit PIP joint when she bends it  Denies any obvious injury  Will obtain x-ray and refer to Orthopedics  Patient is agreeable with this plan  Patient is right-hand dominant  Denies any tingling, numbness, weakness

## 2018-03-21 NOTE — ASSESSMENT & PLAN NOTE
Up-to-date with mammogram last October  Up-to-date with DEXA scan last September  Continue with muscle strengthening weight-bearing exercises as well as vitamin-D and calcium supplementation  Up-to-date with colonoscopy on 09/2015 with recommended repeat in 5 years  Up-to-date with Prevnar and Tdap  Will administer Pneumovax in 6 months

## 2018-03-21 NOTE — ASSESSMENT & PLAN NOTE
Recent lipid panel improved  She has been taking atorvastatin 4 times a week until a few weeks ago when she started to develop upper arm pain after she shovel snow and started to take it 3 times a week  She will try and start taking it 4 times a week again  She will take 5 mg 4 times weekly  Continue with lifestyle modifications  Patient also continues to walk 2 miles twice weekly  Have encouraged increase to 3 to 4 times a week  Continue with Zetia  Will continue to monitor lipid panel

## 2018-03-21 NOTE — ASSESSMENT & PLAN NOTE
Patient has 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, diagnosed in 2015  She follows with dermatology, Dr Tom Garcia  She has follow-up appointment in July  She regularly wears sunscreen

## 2018-03-21 NOTE — ASSESSMENT & PLAN NOTE
Patient takes vitamin-D 89644 units once weekly  Have asked patient to at 1000 iu a day as well  Patient is agreeable with this

## 2018-03-26 ENCOUNTER — TELEPHONE (OUTPATIENT)
Dept: FAMILY MEDICINE CLINIC | Facility: CLINIC | Age: 68
End: 2018-03-26

## 2018-03-26 NOTE — TELEPHONE ENCOUNTER
Re: Hand Xray Results  I'm just checking to see if you have received those results yet  Please call

## 2018-03-28 ENCOUNTER — TELEPHONE (OUTPATIENT)
Dept: FAMILY MEDICINE CLINIC | Facility: CLINIC | Age: 68
End: 2018-03-28

## 2018-03-28 NOTE — PROGRESS NOTES
Can you please let patient know that she does have a fracture of her 3rd finger of her right hand of her middle knuckle  I would like her to be further evaluated by Orthopedics as soon as possible  Can you please provide patient with number    Thank you

## 2018-03-28 NOTE — TELEPHONE ENCOUNTER
----- Message from Anju Galan MD sent at 3/28/2018  2:19 PM EDT -----  Can you please let patient know that she does have a fracture of her 3rd finger of her right hand of her middle knuckle  I would like her to be further evaluated by Orthopedics as soon as possible  Can you please provide patient with number    Thank you

## 2018-03-30 ENCOUNTER — HOSPITAL ENCOUNTER (OUTPATIENT)
Dept: RADIOLOGY | Facility: HOSPITAL | Age: 68
Discharge: HOME | End: 2018-03-30
Attending: ORTHOPAEDIC SURGERY
Payer: MEDICARE

## 2018-03-30 ENCOUNTER — OFFICE VISIT (OUTPATIENT)
Dept: OBGYN CLINIC | Facility: HOSPITAL | Age: 68
End: 2018-03-30
Payer: MEDICARE

## 2018-03-30 ENCOUNTER — TELEPHONE (OUTPATIENT)
Dept: OBGYN CLINIC | Facility: HOSPITAL | Age: 68
End: 2018-03-30

## 2018-03-30 VITALS
BODY MASS INDEX: 31.62 KG/M2 | WEIGHT: 189.8 LBS | HEART RATE: 74 BPM | DIASTOLIC BLOOD PRESSURE: 83 MMHG | SYSTOLIC BLOOD PRESSURE: 139 MMHG | HEIGHT: 65 IN

## 2018-03-30 DIAGNOSIS — M54.12 CERVICAL RADICULITIS: Primary | ICD-10-CM

## 2018-03-30 DIAGNOSIS — M54.12 CERVICAL RADICULITIS: ICD-10-CM

## 2018-03-30 DIAGNOSIS — S62.612A CLOSED DISPLACED FRACTURE OF PROXIMAL PHALANX OF RIGHT MIDDLE FINGER, INITIAL ENCOUNTER: ICD-10-CM

## 2018-03-30 PROCEDURE — 72050 X-RAY EXAM NECK SPINE 4/5VWS: CPT

## 2018-03-30 PROCEDURE — 99205 OFFICE O/P NEW HI 60 MIN: CPT | Performed by: ORTHOPAEDIC SURGERY

## 2018-03-30 RX ORDER — PREDNISONE 1 MG/1
TABLET ORAL
Qty: 42 TABLET | Refills: 1 | Status: SHIPPED | OUTPATIENT
Start: 2018-03-30 | End: 2018-10-02

## 2018-03-30 NOTE — TELEPHONE ENCOUNTER
Can you please call in the prednisone that was ordered today by dr Nathan Jimenez to the correct pharmacy?     thanks

## 2018-03-30 NOTE — TELEPHONE ENCOUNTER
Called script to pharmacy for patient  Called patient to let her know script was called in to pharmacy

## 2018-03-30 NOTE — TELEPHONE ENCOUNTER
Caller: petty Domingo  Call back number: [phone]  Patient's doctor: Dr Mena Child never received the script for prednisone

## 2018-03-30 NOTE — PROGRESS NOTES
Assessment:  No diagnosis found  Patient Active Problem List   Diagnosis    Hyperlipidemia    Finger pain, right    Melanoma in situ ([address] )    Vitamin D deficiency    Routine health maintenance           Plan        Steroids   Referral to pain management   As far as the finger is concerned I would not do anything at this point I think it is an osteophyte that had a little crack off of it but is not true fracture in the proximal phalanx  She has no pain except with palpation she has great range of motion no ecchymosis or erythema  I think most of her arm pain is coming from her neck given the fact that Spurling's test and right side bending and extension also causes pain into the arm and wrist and forearm  Patient may have a subtle carpal tunnel but these pain it seems to be mostly emanating from her neck especially giving her x-ray finding            Subjective:     Patient ID:    Chief Letty Shaver 74 y o  female      HPI    Patient comes in today with regards to Right arm pain that goes basically from her hand and occasionally goes up into the elbow  The patient reports that the pain is in the  Achy type pain and has been going on for  Approximately 2 weeks ago  The pain is rated at0 at its best and9 at its worst   The pain is described as  achy  It is worsened with  Uncertain with movement, and uncertain as to what makes it better  The patient has taken  Tylenol ibuprofen for treatment no improvement  Patient reports she was reaching in the mail box extending her elbow arm and wrist and felt a sharp pain that shot up her forearm        The following portions of the patient's history were reviewed and updated as appropriate: allergies, current medications, past family history, past social history, past surgical history and problem list         Social History     Social History    Marital status: /Civil Union     Spouse name: N/A    Number of children: N/A    Years of education: N/A     Occupational History    Not on file  Social History Main Topics    Smoking status: Never Smoker    Smokeless tobacco: Never Used    Alcohol use Yes      Comment: social    Drug use: No    Sexual activity: Not on file     Other Topics Concern    Not on file     Social History Narrative    No narrative on file     Past Medical History:   Diagnosis Date    Nephrolithiasis     Skin cancer, basal cell     right arm     No past surgical history on file  Allergies   Allergen Reactions    Ezetimibe-Simvastatin Headache    Pravastatin Myalgia    Rosuvastatin Myalgia    Simvastatin Other (See Comments)     Muscle Aches     Current Outpatient Prescriptions on File Prior to Visit   Medication Sig Dispense Refill    atorvastatin (LIPITOR) 10 mg tablet TAKE ONE-HALF TABLET BY MOUTH ON MONDAY, WEDNESDAY, AND FRIDAY  3    Calcium-Magnesium-Vitamin D (CALCIUM MAGNESIUM PO) Take 1 tablet by mouth daily      cholecalciferol (VITAMIN D3) 71588 units capsule Take by mouth      Cyanocobalamin (B-12 PO) Take 1 tablet by mouth daily      ezetimibe (ZETIA) 10 mg tablet Take 1 tablet by mouth daily      FOLIC ACID PO Take 1 tablet by mouth daily      Multiple Vitamin (MULTIVITAMINS PO) Take 1 tablet by mouth daily       No current facility-administered medications on file prior to visit  Objective:    Review of Systems   Constitutional: Negative  HENT: Negative  Eyes: Negative  Respiratory: Negative  Cardiovascular: Negative  Gastrointestinal: Negative  Endocrine: Negative  Genitourinary: Negative  Skin: Negative  Allergic/Immunologic: Negative  Neurological: Negative  Hematological: Negative  Psychiatric/Behavioral: Negative          Back Exam     Tenderness   The patient is experiencing tenderness in the cervical     Range of Motion   Extension: normal   Flexion: normal   Lateral Bend Right: abnormal   Lateral Bend Left: normal   Rotation Right: normal Rotation Left: normal     Comments:    Positive referral symptoms with right side bending and extension  Spurling's test accentuates that  Nerve tension tests are negative for radial median and ulnar nerves  Sensation to light touch is intact in both upper extremities and symmetric  Strength is 5/5 in both upper extremities in all myotomes  physical exam of the finger she has osteoarthritic changes in the DIP is an PIP is  She has some mild tenderness over the PIP of the middle finger but there is no ecchymosis no swelling full range of motion  Physical Exam   Constitutional: She is oriented to person, place, and time  She appears well-developed  HENT:   Head: Normocephalic  Eyes: Pupils are equal, round, and reactive to light  Neck: Normal range of motion  Cardiovascular: Normal rate  Pulmonary/Chest: Effort normal    Abdominal: She exhibits no distension  Neurological: She is alert and oriented to person, place, and time  Skin: Skin is warm  Psychiatric: She has a normal mood and affect  Nursing note and vitals reviewed  Procedures  No Procedures performed today    I have personally reviewed pertinent films in PACS and my interpretation is Significant osteoarthritic and degenerative disc disease at C5-C6-C6-C7  Portions of the record may have been created with voice recognition software   Occasional wrong word or "sound a like" substitutions may have occurred due to the inherent limitations of voice recognition software   Read the chart carefully and recognize, using context, where substitutions have occurred

## 2018-09-27 ENCOUNTER — LAB (OUTPATIENT)
Dept: LAB | Facility: CLINIC | Age: 68
End: 2018-09-27
Payer: MEDICARE

## 2018-09-27 ENCOUNTER — TRANSCRIBE ORDERS (OUTPATIENT)
Dept: LAB | Facility: CLINIC | Age: 68
End: 2018-09-27

## 2018-09-27 DIAGNOSIS — E78.5 HYPERLIPIDEMIA, UNSPECIFIED HYPERLIPIDEMIA TYPE: ICD-10-CM

## 2018-09-27 LAB
ALBUMIN SERPL BCP-MCNC: 3.8 G/DL (ref 3.5–5)
ALP SERPL-CCNC: 76 U/L (ref 46–116)
ALT SERPL W P-5'-P-CCNC: 37 U/L (ref 12–78)
ANION GAP SERPL CALCULATED.3IONS-SCNC: 9 MMOL/L (ref 4–13)
AST SERPL W P-5'-P-CCNC: 21 U/L (ref 5–45)
BILIRUB SERPL-MCNC: 0.67 MG/DL (ref 0.2–1)
BUN SERPL-MCNC: 17 MG/DL (ref 5–25)
CALCIUM SERPL-MCNC: 8.9 MG/DL (ref 8.3–10.1)
CHLORIDE SERPL-SCNC: 105 MMOL/L (ref 100–108)
CHOLEST SERPL-MCNC: 229 MG/DL (ref 50–200)
CO2 SERPL-SCNC: 25 MMOL/L (ref 21–32)
CREAT SERPL-MCNC: 0.74 MG/DL (ref 0.6–1.3)
GFR SERPL CREATININE-BSD FRML MDRD: 85 ML/MIN/1.73SQ M
GLUCOSE P FAST SERPL-MCNC: 91 MG/DL (ref 65–99)
HDLC SERPL-MCNC: 47 MG/DL (ref 40–60)
LDLC SERPL CALC-MCNC: 161 MG/DL (ref 0–100)
POTASSIUM SERPL-SCNC: 3.9 MMOL/L (ref 3.5–5.3)
PROT SERPL-MCNC: 7.6 G/DL (ref 6.4–8.2)
SODIUM SERPL-SCNC: 139 MMOL/L (ref 136–145)
TRIGL SERPL-MCNC: 106 MG/DL

## 2018-09-27 PROCEDURE — 80053 COMPREHEN METABOLIC PANEL: CPT

## 2018-09-27 PROCEDURE — 80061 LIPID PANEL: CPT

## 2018-09-27 PROCEDURE — 36415 COLL VENOUS BLD VENIPUNCTURE: CPT

## 2018-10-02 ENCOUNTER — OFFICE VISIT (OUTPATIENT)
Dept: FAMILY MEDICINE CLINIC | Facility: CLINIC | Age: 68
End: 2018-10-02
Payer: MEDICARE

## 2018-10-02 VITALS
BODY MASS INDEX: 31.09 KG/M2 | SYSTOLIC BLOOD PRESSURE: 128 MMHG | HEART RATE: 68 BPM | HEIGHT: 65 IN | WEIGHT: 186.6 LBS | DIASTOLIC BLOOD PRESSURE: 84 MMHG | TEMPERATURE: 96.9 F | RESPIRATION RATE: 14 BRPM

## 2018-10-02 DIAGNOSIS — E55.9 HYPOVITAMINOSIS D: ICD-10-CM

## 2018-10-02 DIAGNOSIS — E78.5 HYPERLIPIDEMIA, UNSPECIFIED HYPERLIPIDEMIA TYPE: Primary | ICD-10-CM

## 2018-10-02 DIAGNOSIS — D03.9 MELANOMA IN SITU, UNSPECIFIED SITE (HCC): ICD-10-CM

## 2018-10-02 DIAGNOSIS — Z12.31 SCREENING MAMMOGRAM, ENCOUNTER FOR: ICD-10-CM

## 2018-10-02 DIAGNOSIS — Z23 NEED FOR INFLUENZA VACCINATION: ICD-10-CM

## 2018-10-02 DIAGNOSIS — Z00.00 ROUTINE HEALTH MAINTENANCE: ICD-10-CM

## 2018-10-02 DIAGNOSIS — R53.83 FATIGUE, UNSPECIFIED TYPE: ICD-10-CM

## 2018-10-02 DIAGNOSIS — Z23 NEED FOR 23-POLYVALENT PNEUMOCOCCAL POLYSACCHARIDE VACCINE: ICD-10-CM

## 2018-10-02 PROCEDURE — 90662 IIV NO PRSV INCREASED AG IM: CPT | Performed by: FAMILY MEDICINE

## 2018-10-02 PROCEDURE — 99214 OFFICE O/P EST MOD 30 MIN: CPT | Performed by: FAMILY MEDICINE

## 2018-10-02 PROCEDURE — 90732 PPSV23 VACC 2 YRS+ SUBQ/IM: CPT | Performed by: FAMILY MEDICINE

## 2018-10-02 PROCEDURE — G0009 ADMIN PNEUMOCOCCAL VACCINE: HCPCS | Performed by: FAMILY MEDICINE

## 2018-10-02 PROCEDURE — G0008 ADMIN INFLUENZA VIRUS VAC: HCPCS | Performed by: FAMILY MEDICINE

## 2018-10-02 PROCEDURE — G0438 PPPS, INITIAL VISIT: HCPCS | Performed by: FAMILY MEDICINE

## 2018-10-02 NOTE — PROGRESS NOTES
FAMILY PRACTICE OFFICE VISIT       NAME: Brea Rosenberg  AGE: 79 y o  SEX: female       : 1950        MRN: [id number]    DATE: 10/5/2018  TIME: 5:56 PM    Assessment and Plan     Problem List Items Addressed This Visit     Hyperlipidemia - Primary    Relevant Orders    Lipid Panel with Direct LDL reflex    Comprehensive metabolic panel    Melanoma in situ ([facility] [address] )    Hypovitaminosis D    Routine health maintenance      Other Visit Diagnoses     Screening mammogram, encounter for        Relevant Orders    Mammo screening bilateral w 3d & cad    Fatigue, unspecified type        Relevant Orders    CBC and differential    Comprehensive metabolic panel    TSH, 3rd generation with Free T4 reflex    Need for influenza vaccination        Relevant Orders    influenza vaccine, [id number], high-dose, PF 0 5 mL, for patients 65 yr+ (FLUZONE HIGH-DOSE) (Completed)    Need for 23-polyvalent pneumococcal polysaccharide vaccine        Relevant Orders    Pneumococcal polysaccharide vaccine 23-valent greater than or equal to 3yo subcutaneous/IM (Completed)         Hyperlipidemia:    Blood work Rx for lipid panel has been provided  Continue with atorvastatin, Zetia as well as lifestyle modifications  Recommend routine exercise regimen as well  Will continue to monitor  Melanoma in situ:    Followed closely by Dr Woodward  Patient had a follow-up appointment in July  Hypovitaminosis D:    Patient takes vitamin-D 10,000 international units weekly  Have recommended taking 1000 international daily in addition to which she takes for the winter months  Routine health maintenance:    Up-to-date with Prevnar, Pneumovax, Tdap  Flu vaccine administered today  Have discussed the new shingles vaccine  She will inquire with her insurance company regarding coverage  Rx for mammogram provided  Up-to-date with DEXA scan  Up-to-date with colonoscopy on 2015 with recommended repeat in 5 years        There are no Patient Instructions on file for this visit  Chief Complaint     Chief Complaint   Patient presents with    Medicare Wellness Visit     initial    Follow-up     Patient is here for a follow-up  History of Present Illness     HPI    51-year-old female here for follow-up of chronic medical conditions and discuss recent blood work results  Patient states she is doing well overall  Takes vit d 10,000 weekly  Saw dr stafford in July  Patient states she maintains well-balanced diet  She is very active as she takes care of her grandchildren as well  Review of Systems   Review of Systems   Constitutional: Negative for unexpected weight change  HENT: Negative  Eyes: Negative for visual disturbance  Respiratory: Negative for shortness of breath  Cardiovascular: Negative  Gastrointestinal: Negative for abdominal pain and constipation  Genitourinary: Negative for dysuria  Psychiatric/Behavioral: Negative for dysphoric mood and sleep disturbance  Active Problem List     Patient Active Problem List   Diagnosis    Hyperlipidemia    Finger pain, right    Melanoma in situ ([facility] [address]    Hypovitaminosis D    Routine health maintenance    Cervical radiculitis    Closed displaced fracture of proximal phalanx of right middle finger       Past Medical History:  Past Medical History:   Diagnosis Date    Nephrolithiasis     Skin cancer, basal cell     right arm       Past Surgical History:  No past surgical history on file  Family History:  Family History   Problem Relation Age of Onset    Hyperlipidemia Mother     Colon cancer Family     Hypertension Family     Cancer Family         laryngeal       Social History:  Social History     Social History    Marital status: /Civil Union     Spouse name: N/A    Number of children: N/A    Years of education: N/A     Occupational History    Not on file       Social History Main Topics    Smoking status: Never Smoker    Smokeless tobacco: Never Used    Alcohol use Yes      Comment: social    Drug use: No    Sexual activity: Not on file     Other Topics Concern    Not on file     Social History Narrative    No narrative on file     I have reviewed the patient's medical history in detail; there are no changes to the history as noted in the electronic medical record  Objective     Vitals:    10/02/18 1052   BP: 128/84   Pulse: 68   Resp: 14   Temp: (!) 96 9 °F (36 1 °C)     Wt Readings from Last 3 Encounters:   10/02/18 84 6 kg (186 lb 9 6 oz)   03/30/18 86 1 kg (189 lb 12 8 oz)   03/20/18 85 5 kg (188 lb 6 4 oz)         Physical Exam   Constitutional: She is oriented to person, place, and time  She appears well-developed and well-nourished  HENT:   Head: Normocephalic and atraumatic  Mouth/Throat: Oropharynx is clear and moist      TMs intact and clear   Eyes: Pupils are equal, round, and reactive to light  Conjunctivae and EOM are normal    Neck: Normal range of motion  Neck supple  No thyromegaly present  Cardiovascular: Normal rate, regular rhythm and normal heart sounds  No carotid bruits auscultated   Pulmonary/Chest: Effort normal and breath sounds normal    Abdominal: Soft  Bowel sounds are normal  She exhibits no distension  There is no tenderness  Musculoskeletal: Normal range of motion  She exhibits no edema  Lymphadenopathy:     She has no cervical adenopathy  Neurological: She is alert and oriented to person, place, and time  Skin: No rash noted  Psychiatric: She has a normal mood and affect  Nursing note and vitals reviewed        Pertinent Laboratory/Diagnostic Studies:  Lab Results   Component Value Date    GLUCOSE 82 01/04/2016    BUN 17 09/27/2018    CREATININE 0 74 09/27/2018    CALCIUM 8 9 09/27/2018     09/27/2018    K 3 9 09/27/2018    CO2 25 09/27/2018     09/27/2018     Lab Results   Component Value Date    ALT 37 09/27/2018    AST 21 09/27/2018    ALKPHOS 76 09/27/2018    BILITOT 0 37 06/25/2015       Lab Results   Component Value Date    WBC 5 57 03/16/2018    HGB 13 1 03/16/2018    HCT 40 2 03/16/2018    MCV 93 03/16/2018     03/16/2018       No results found for: TSH    Lab Results   Component Value Date    CHOL 286 01/04/2016     Lab Results   Component Value Date    TRIG 106 09/27/2018     Lab Results   Component Value Date    HDL 47 09/27/2018     Lab Results   Component Value Date    LDLCALC 161 (H) 09/27/2018     No results found for: HGBA1C    Results for orders placed or performed in visit on 09/27/18   Lipid Panel with Direct LDL reflex   Result Value Ref Range    Cholesterol 229 (H) 50 - 200 mg/dL    Triglycerides 106 <=150 mg/dL    HDL, Direct 47 40 - 60 mg/dL    LDL Calculated 161 (H) 0 - 100 mg/dL   Comprehensive metabolic panel   Result Value Ref Range    Sodium 139 136 - 145 mmol/L    Potassium 3 9 3 5 - 5 3 mmol/L    Chloride 105 100 - 108 mmol/L    CO2 25 21 - 32 mmol/L    ANION GAP 9 4 - 13 mmol/L    BUN 17 5 - 25 mg/dL    Creatinine 0 74 0 60 - 1 30 mg/dL    Glucose, Fasting 91 65 - 99 mg/dL    Calcium 8 9 8 3 - 10 1 mg/dL    AST 21 5 - 45 U/L    ALT 37 12 - 78 U/L    Alkaline Phosphatase 76 46 - 116 U/L    Total Protein 7 6 6 4 - 8 2 g/dL    Albumin 3 8 3 5 - 5 0 g/dL    Total Bilirubin 0 67 0 20 - 1 00 mg/dL    eGFR 85 ml/min/1 73sq m       Orders Placed This Encounter   Procedures    Mammo screening bilateral w 3d & cad    Pneumococcal polysaccharide vaccine 23-valent greater than or equal to 1yo subcutaneous/IM    influenza vaccine, [id number], high-dose, PF 0 5 mL, for patients 65 yr+ (FLUZONE HIGH-DOSE)    CBC and differential    Lipid Panel with Direct LDL reflex    Comprehensive metabolic panel    TSH, 3rd generation with Free T4 reflex       ALLERGIES:  Allergies   Allergen Reactions    Ezetimibe-Simvastatin Headache    Pravastatin Myalgia    Rosuvastatin Myalgia    Simvastatin Other (See Comments)     Muscle Aches       Current Medications Current Outpatient Prescriptions   Medication Sig Dispense Refill    Calcium-Magnesium-Vitamin D (CALCIUM MAGNESIUM PO) Take 1 tablet by mouth daily      cholecalciferol (VITAMIN D3) 64595 units capsule Take by mouth      Cyanocobalamin (B-12 PO) Take 1 tablet by mouth daily      FOLIC ACID PO Take 1 tablet by mouth daily      Multiple Vitamin (MULTIVITAMINS PO) Take 1 tablet by mouth daily      atorvastatin (LIPITOR) 10 mg tablet TAKE ONE-HALF TABLET BY MOUTH ON MONDAY, WEDNESDAY, AND FRIDAY 30 tablet 3    ezetimibe (ZETIA) 10 mg tablet TAKE ONE TABLET BY MOUTH EVERY DAY 90 tablet 3     No current facility-administered medications for this visit            Health Maintenance     Health Maintenance   Topic Date Due    CRC Screening: Colonoscopy  1950    Fall Risk  10/02/2019    Depression Screening PHQ  10/02/2019    Urinary Incontinence Screening  10/02/2019    DTaP,Tdap,and Td Vaccines (2 - Td) 06/30/2024    INFLUENZA VACCINE  Completed    Pneumococcal PPSV23/PCV13 65+ Years / High and Highest Risk  Completed     Immunization History   Administered Date(s) Administered    Influenza TIV (IM) 09/13/2010, 01/13/2017    Influenza, high dose seasonal 0 5 mL 10/02/2018    Pneumococcal Conjugate 13-Valent 09/12/2017    Pneumococcal Polysaccharide PPV23 10/02/2018    Td (adult), adsorbed 05/04/2004    Tdap 06/30/2014       Brissa Conner MD

## 2018-10-03 DIAGNOSIS — E78.5 HYPERLIPIDEMIA, UNSPECIFIED HYPERLIPIDEMIA TYPE: Primary | ICD-10-CM

## 2018-10-03 RX ORDER — EZETIMIBE 10 MG/1
TABLET ORAL
Qty: 90 TABLET | Refills: 3 | Status: SHIPPED | OUTPATIENT
Start: 2018-10-03 | End: 2019-12-20 | Stop reason: SDUPTHER

## 2018-10-03 RX ORDER — ATORVASTATIN CALCIUM 10 MG/1
TABLET, FILM COATED ORAL
Qty: 30 TABLET | Refills: 3 | Status: SHIPPED | OUTPATIENT
Start: 2018-10-03 | End: 2019-11-04 | Stop reason: SDUPTHER

## 2019-01-31 ENCOUNTER — HOSPITAL ENCOUNTER (OUTPATIENT)
Dept: RADIOLOGY | Age: 69
Discharge: HOME | End: 2019-01-31
Payer: MEDICARE

## 2019-01-31 VITALS — HEIGHT: 66 IN | WEIGHT: 181 LBS | BODY MASS INDEX: 29.09 KG/M2

## 2019-01-31 DIAGNOSIS — Z12.31 SCREENING MAMMOGRAM, ENCOUNTER FOR: ICD-10-CM

## 2019-01-31 PROCEDURE — 77063 BREAST TOMOSYNTHESIS BI: CPT

## 2019-01-31 PROCEDURE — 77067 SCR MAMMO BI INCL CAD: CPT

## 2019-04-04 ENCOUNTER — LAB (OUTPATIENT)
Dept: LAB | Facility: CLINIC | Age: 69
End: 2019-04-04
Payer: MEDICARE

## 2019-04-04 DIAGNOSIS — R53.83 FATIGUE, UNSPECIFIED TYPE: ICD-10-CM

## 2019-04-04 DIAGNOSIS — E78.5 HYPERLIPIDEMIA, UNSPECIFIED HYPERLIPIDEMIA TYPE: ICD-10-CM

## 2019-04-04 LAB
ALBUMIN SERPL BCP-MCNC: 3.8 G/DL (ref 3.5–5)
ALP SERPL-CCNC: 73 U/L (ref 46–116)
ALT SERPL W P-5'-P-CCNC: 30 U/L (ref 12–78)
ANION GAP SERPL CALCULATED.3IONS-SCNC: 4 MMOL/L (ref 4–13)
AST SERPL W P-5'-P-CCNC: 28 U/L (ref 5–45)
BASOPHILS # BLD AUTO: 0.06 THOUSANDS/ΜL (ref 0–0.1)
BASOPHILS NFR BLD AUTO: 1 % (ref 0–1)
BILIRUB SERPL-MCNC: 0.62 MG/DL (ref 0.2–1)
BUN SERPL-MCNC: 18 MG/DL (ref 5–25)
CALCIUM SERPL-MCNC: 8.7 MG/DL (ref 8.3–10.1)
CHLORIDE SERPL-SCNC: 106 MMOL/L (ref 100–108)
CHOLEST SERPL-MCNC: 238 MG/DL (ref 50–200)
CO2 SERPL-SCNC: 28 MMOL/L (ref 21–32)
CREAT SERPL-MCNC: 0.77 MG/DL (ref 0.6–1.3)
EOSINOPHIL # BLD AUTO: 0.14 THOUSAND/ΜL (ref 0–0.61)
EOSINOPHIL NFR BLD AUTO: 2 % (ref 0–6)
ERYTHROCYTE [DISTWIDTH] IN BLOOD BY AUTOMATED COUNT: 11.9 % (ref 11.6–15.1)
GFR SERPL CREATININE-BSD FRML MDRD: 81 ML/MIN/1.73SQ M
GLUCOSE P FAST SERPL-MCNC: 95 MG/DL (ref 65–99)
HCT VFR BLD AUTO: 42.9 % (ref 34.8–46.1)
HDLC SERPL-MCNC: 54 MG/DL (ref 40–60)
HGB BLD-MCNC: 13.6 G/DL (ref 11.5–15.4)
IMM GRANULOCYTES # BLD AUTO: 0.03 THOUSAND/UL (ref 0–0.2)
IMM GRANULOCYTES NFR BLD AUTO: 1 % (ref 0–2)
LDLC SERPL CALC-MCNC: 163 MG/DL (ref 0–100)
LYMPHOCYTES # BLD AUTO: 1.06 THOUSANDS/ΜL (ref 0.6–4.47)
LYMPHOCYTES NFR BLD AUTO: 17 % (ref 14–44)
MCH RBC QN AUTO: 30.5 PG (ref 26.8–34.3)
MCHC RBC AUTO-ENTMCNC: 31.7 G/DL (ref 31.4–37.4)
MCV RBC AUTO: 96 FL (ref 82–98)
MONOCYTES # BLD AUTO: 0.58 THOUSAND/ΜL (ref 0.17–1.22)
MONOCYTES NFR BLD AUTO: 9 % (ref 4–12)
NEUTROPHILS # BLD AUTO: 4.33 THOUSANDS/ΜL (ref 1.85–7.62)
NEUTS SEG NFR BLD AUTO: 70 % (ref 43–75)
NRBC BLD AUTO-RTO: 0 /100 WBCS
PLATELET # BLD AUTO: 243 THOUSANDS/UL (ref 149–390)
PMV BLD AUTO: 11 FL (ref 8.9–12.7)
POTASSIUM SERPL-SCNC: 4.1 MMOL/L (ref 3.5–5.3)
PROT SERPL-MCNC: 7.2 G/DL (ref 6.4–8.2)
RBC # BLD AUTO: 4.46 MILLION/UL (ref 3.81–5.12)
SODIUM SERPL-SCNC: 138 MMOL/L (ref 136–145)
TRIGL SERPL-MCNC: 105 MG/DL
TSH SERPL DL<=0.05 MIU/L-ACNC: 2.19 UIU/ML (ref 0.36–3.74)
WBC # BLD AUTO: 6.2 THOUSAND/UL (ref 4.31–10.16)

## 2019-04-04 PROCEDURE — 80061 LIPID PANEL: CPT

## 2019-04-04 PROCEDURE — 80053 COMPREHEN METABOLIC PANEL: CPT

## 2019-04-04 PROCEDURE — 36415 COLL VENOUS BLD VENIPUNCTURE: CPT

## 2019-04-04 PROCEDURE — 84443 ASSAY THYROID STIM HORMONE: CPT

## 2019-04-04 PROCEDURE — 85025 COMPLETE CBC W/AUTO DIFF WBC: CPT

## 2019-04-09 ENCOUNTER — OFFICE VISIT (OUTPATIENT)
Dept: FAMILY MEDICINE CLINIC | Facility: CLINIC | Age: 69
End: 2019-04-09
Payer: MEDICARE

## 2019-04-09 VITALS
SYSTOLIC BLOOD PRESSURE: 100 MMHG | HEIGHT: 66 IN | BODY MASS INDEX: 30.5 KG/M2 | TEMPERATURE: 97 F | DIASTOLIC BLOOD PRESSURE: 74 MMHG | RESPIRATION RATE: 14 BRPM | HEART RATE: 68 BPM | WEIGHT: 189.8 LBS

## 2019-04-09 DIAGNOSIS — E78.5 HYPERLIPIDEMIA, UNSPECIFIED HYPERLIPIDEMIA TYPE: ICD-10-CM

## 2019-04-09 DIAGNOSIS — E55.9 VITAMIN D DEFICIENCY: Primary | ICD-10-CM

## 2019-04-09 DIAGNOSIS — Z01.84 IMMUNITY STATUS TESTING: ICD-10-CM

## 2019-04-09 DIAGNOSIS — Z00.00 ROUTINE HEALTH MAINTENANCE: ICD-10-CM

## 2019-04-09 DIAGNOSIS — Z13.820 SCREENING FOR OSTEOPOROSIS: ICD-10-CM

## 2019-04-09 DIAGNOSIS — Z11.59 NEED FOR HEPATITIS C SCREENING TEST: ICD-10-CM

## 2019-04-09 DIAGNOSIS — D03.9 MELANOMA IN SITU, UNSPECIFIED SITE (HCC): ICD-10-CM

## 2019-04-09 PROCEDURE — 99214 OFFICE O/P EST MOD 30 MIN: CPT | Performed by: FAMILY MEDICINE

## 2019-07-17 ENCOUNTER — OFFICE VISIT (OUTPATIENT)
Dept: FAMILY MEDICINE CLINIC | Facility: CLINIC | Age: 69
End: 2019-07-17
Payer: MEDICARE

## 2019-07-17 VITALS
WEIGHT: 191.8 LBS | BODY MASS INDEX: 30.82 KG/M2 | HEART RATE: 74 BPM | DIASTOLIC BLOOD PRESSURE: 68 MMHG | OXYGEN SATURATION: 96 % | TEMPERATURE: 96.9 F | RESPIRATION RATE: 16 BRPM | HEIGHT: 66 IN | SYSTOLIC BLOOD PRESSURE: 112 MMHG

## 2019-07-17 DIAGNOSIS — Z71.2 ENCOUNTER TO DISCUSS TEST RESULTS: Primary | ICD-10-CM

## 2019-07-17 PROCEDURE — 99213 OFFICE O/P EST LOW 20 MIN: CPT | Performed by: FAMILY MEDICINE

## 2019-07-17 NOTE — PROGRESS NOTES
FAMILY PRACTICE OFFICE VISIT       NAME: Leetta Apley  AGE: 81 y o  SEX: female       : 1950        MRN: [id number]    DATE: 2019  TIME: 1:56 PM    Assessment and Plan     Problem List Items Addressed This Visit        Other    Encounter to discuss test results - Primary        53-year-old female here to discuss recent test results of mitoswab  Discussed with patient that her amount of mitochondrial is within normal range noted to be 100% with did start amount greater than 80%  Her complex 1 is also functioning optimally at 107%  Complex 2 is low at 22%  I have discussed with patient that the food we eat enters the mitochondria through complex 1 and 2  Complex 3 is functioning high at 519 %  A dysfunctional complex we will produce ox sedated stress  The job of complex 3 is to transfer electrons from complex 1 and 2 to complex 4  Complex for is 170% which is mildly high which may be a compensatory  Mechanism  I would like to start patient on nac 600 mg bid and ubiquinol 100 mg twice daily to reduce oxidative stress  Have discussed the importance of diet, continue with on processed foods  Would benefit from dairy free and gluten free diet as well  Have advised on routine exercise regimen as well  Patient will keep her scheduled follow-up appointment with me in the fall  Would consider repeating med as well in 6 months  She is agreeable with this plan  There are no Patient Instructions on file for this visit  I have spent 15 minutes with Patient  today in which greater than 50% of this time was spent in counseling/coordination of care regarding Diagnostic results, Prognosis, Risks and benefits of tx options, Intructions for management, Patient and family education, Importance of tx compliance, Risk factor reductions and Impressions              Chief Complaint     Chief Complaint   Patient presents with    Follow-up     Pt is here to discuss test results       History of Present Illness     HPI  31-year-old female here to discuss recent test results of mitoswab  Patient states she is doing well overall  She does maintain well-balanced diet, typically uses unprocessed foods  Usually active as well  Review of Systems   Review of Systems   Constitutional: Negative for activity change, appetite change and unexpected weight change  Gastrointestinal: Negative for abdominal pain  Active Problem List     Patient Active Problem List   Diagnosis    Hyperlipidemia    Finger pain, right    Melanoma in situ ([address] )    Hypovitaminosis D    Routine health maintenance    Cervical radiculitis    Closed displaced fracture of proximal phalanx of right middle finger    Basal cell adenocarcinoma    Encounter to discuss test results       Past Medical History:  Past Medical History:   Diagnosis Date    Basal cell adenocarcinoma 2011    [facility]oma [facility]) 2010    Nephrolithiasis     Skin cancer, basal cell     right arm       Past Surgical History:  History reviewed  No pertinent surgical history      Family History:  Family History   Problem Relation Age of Onset    Hyperlipidemia Mother     Colon cancer Family     Hypertension Family     Cancer Family         laryngeal    Colon cancer Father 80    Ovarian cancer Cousin 45    Pancreatic cancer Cousin 39    Prostate cancer Paternal Uncle 76       Social History:  Social History     Socioeconomic History    Marital status: /Civil Union     Spouse name: Not on file    Number of children: Not on file    Years of education: Not on file    Highest education level: Not on file   Occupational History    Not on file   Social Needs    Financial resource strain: Not on file    Food insecurity:     Worry: Not on file     Inability: Not on file    Transportation needs:     Medical: Not on file     Non-medical: Not on file   Tobacco Use    Smoking status: Never Smoker    Smokeless tobacco: Never Used   Substance and Sexual Activity  Alcohol use: Yes     Comment: social    Drug use: No    Sexual activity: Not on file   Lifestyle    Physical activity:     Days per week: Not on file     Minutes per session: Not on file    Stress: Not on file   Relationships    Social connections:     Talks on phone: Not on file     Gets together: Not on file     Attends Restorationist service: Not on file     Active member of club or organization: Not on file     Attends meetings of clubs or organizations: Not on file     Relationship status: Not on file    Intimate partner violence:     Fear of current or ex partner: Not on file     Emotionally abused: Not on file     Physically abused: Not on file     Forced sexual activity: Not on file   Other Topics Concern    Not on file   Social History Narrative    Not on file     I have reviewed the patient's medical history in detail; there are no changes to the history as noted in the electronic medical record  Objective     Vitals:    07/17/19 0728   BP: 112/68   Pulse: 74   Resp: 16   Temp: (!) 96 9 °F (36 1 °C)   SpO2: 96%     Wt Readings from Last 3 Encounters:   07/17/19 87 kg (191 lb 12 8 oz)   04/09/19 86 1 kg (189 lb 12 8 oz)   01/31/19 82 1 kg (181 lb)       Physical Exam   Constitutional: She appears well-developed and well-nourished  HENT:   Head: Normocephalic and atraumatic  Psychiatric: She has a normal mood and affect  Nursing note and vitals reviewed        Pertinent Laboratory/Diagnostic Studies:  Lab Results   Component Value Date    GLUCOSE 82 01/04/2016    BUN 18 04/04/2019    CREATININE 0 77 04/04/2019    CALCIUM 8 7 04/04/2019     01/04/2016    K 4 1 04/04/2019    CO2 28 04/04/2019     04/04/2019     Lab Results   Component Value Date    ALT 30 04/04/2019    AST 28 04/04/2019    ALKPHOS 73 04/04/2019    BILITOT 0 37 06/25/2015       Lab Results   Component Value Date    WBC 6 20 04/04/2019    HGB 13 6 04/04/2019    HCT 42 9 04/04/2019    MCV 96 04/04/2019     04/04/2019       No results found for: TSH    Lab Results   Component Value Date    CHOL 286 01/04/2016     Lab Results   Component Value Date    TRIG 105 04/04/2019     Lab Results   Component Value Date    HDL 54 04/04/2019     Lab Results   Component Value Date    LDLCALC 163 (H) 04/04/2019     No results found for: HGBA1C    Results for orders placed or performed in visit on 04/04/19   CBC and differential   Result Value Ref Range    WBC 6 20 4  31 - 10 16 Thousand/uL    RBC 4 46 3 81 - 5 12 Million/uL    Hemoglobin 13 6 11 5 - 15 4 g/dL    Hematocrit 42 9 34 8 - 46 1 %    MCV 96 82 - 98 fL    MCH 30 5 26 8 - 34 3 pg    MCHC 31 7 31 4 - 37 4 g/dL    RDW 11 9 11 6 - 15 1 %    MPV 11 0 8 9 - 12 7 fL    Platelets 863 965 - 398 Thousands/uL    nRBC 0 /100 WBCs    Neutrophils Relative 70 43 - 75 %    Immat GRANS % 1 0 - 2 %    Lymphocytes Relative 17 14 - 44 %    Monocytes Relative 9 4 - 12 %    Eosinophils Relative 2 0 - 6 %    Basophils Relative 1 0 - 1 %    Neutrophils Absolute 4 33 1 85 - 7 62 Thousands/µL    Immature Grans Absolute 0 03 0 00 - 0 20 Thousand/uL    Lymphocytes Absolute 1 06 0 60 - 4 47 Thousands/µL    Monocytes Absolute 0 58 0 17 - 1 22 Thousand/µL    Eosinophils Absolute 0 14 0 00 - 0 61 Thousand/µL    Basophils Absolute 0 06 0 00 - 0 10 Thousands/µL   Lipid Panel with Direct LDL reflex   Result Value Ref Range    Cholesterol 238 (H) 50 - 200 mg/dL    Triglycerides 105 <=150 mg/dL    HDL, Direct 54 40 - 60 mg/dL    LDL Calculated 163 (H) 0 - 100 mg/dL   Comprehensive metabolic panel   Result Value Ref Range    Sodium 138 136 - 145 mmol/L    Potassium 4 1 3 5 - 5 3 mmol/L    Chloride 106 100 - 108 mmol/L    CO2 28 21 - 32 mmol/L    ANION GAP 4 4 - 13 mmol/L    BUN 18 5 - 25 mg/dL    Creatinine 0 77 0 60 - 1 30 mg/dL    Glucose, Fasting 95 65 - 99 mg/dL    Calcium 8 7 8 3 - 10 1 mg/dL    AST 28 5 - 45 U/L    ALT 30 12 - 78 U/L    Alkaline Phosphatase 73 46 - 116 U/L    Total Protein 7 2 6 4 - 8 2 g/dL    Albumin 3 8 3 5 - 5 0 g/dL    Total Bilirubin 0 62 0 20 - 1 00 mg/dL    eGFR 81 ml/min/1 73sq m   TSH, 3rd generation with Free T4 reflex   Result Value Ref Range    TSH 3RD GENERATON 2 190 0 358 - 3 740 uIU/mL       No orders of the defined types were placed in this encounter  ALLERGIES:  Allergies   Allergen Reactions    Ezetimibe-Simvastatin Headache    Pravastatin Myalgia    Rosuvastatin Myalgia    Simvastatin Other (See Comments)     Muscle Aches       Current Medications     Current Outpatient Medications   Medication Sig Dispense Refill    atorvastatin (LIPITOR) 10 mg tablet TAKE ONE-HALF TABLET BY MOUTH ON MONDAY, WEDNESDAY, AND FRIDAY 30 tablet 3    Calcium-Magnesium-Vitamin D (CALCIUM MAGNESIUM PO) Take 1 tablet by mouth daily      cholecalciferol (VITAMIN D3) 38041 units capsule Take by mouth      Cyanocobalamin (B-12 PO) Take 1 tablet by mouth daily      ezetimibe (ZETIA) 10 mg tablet TAKE ONE TABLET BY MOUTH EVERY DAY 90 tablet 3    FOLIC ACID PO Take 1 tablet by mouth daily      Multiple Vitamin (MULTIVITAMINS PO) Take 1 tablet by mouth daily       No current facility-administered medications for this visit            Health Maintenance     Health Maintenance   Topic Date Due    Hepatitis C Screening  1950    BMI: Followup Plan  07/11/1970    INFLUENZA VACCINE  07/01/2019    Fall Risk  10/02/2019    Depression Screening PHQ  10/02/2019    Urinary Incontinence Screening  10/02/2019    Medicare Annual Wellness Visit (AWV)  10/02/2019    BMI: Adult  07/17/2020    MAMMOGRAM  01/31/2021    DTaP,Tdap,and Td Vaccines (2 - Td) 06/30/2024    CRC Screening: Colonoscopy  09/16/2025    Pneumococcal Vaccine: 65+ Years  Completed    Pneumococcal Vaccine: Pediatrics (0 to 5 Years) and At-Risk Patients (6 to 59 Years)  Aged Out    HEPATITIS B VACCINES  Aged [employer] History   Administered Date(s) Administered    Influenza TIV (IM) 09/13/2010, 01/13/2017    Influenza, high dose seasonal 0 5 mL 10/02/2018    Pneumococcal Conjugate 13-Valent 09/12/2017    Pneumococcal Polysaccharide PPV23 10/02/2018    Td (adult), adsorbed 05/04/2004    Tdap 06/30/2014       Leni Hernandez MD

## 2019-07-21 PROBLEM — Z71.2 ENCOUNTER TO DISCUSS TEST RESULTS: Status: ACTIVE | Noted: 2019-07-21

## 2019-09-25 ENCOUNTER — OFFICE VISIT (OUTPATIENT)
Dept: FAMILY MEDICINE CLINIC | Facility: CLINIC | Age: 69
End: 2019-09-25
Payer: MEDICARE

## 2019-09-25 ENCOUNTER — HOSPITAL ENCOUNTER (OUTPATIENT)
Dept: NON INVASIVE DIAGNOSTICS | Facility: CLINIC | Age: 69
Discharge: HOME | End: 2019-09-25
Payer: MEDICARE

## 2019-09-25 VITALS
DIASTOLIC BLOOD PRESSURE: 74 MMHG | BODY MASS INDEX: 30.98 KG/M2 | OXYGEN SATURATION: 95 % | TEMPERATURE: 97.6 F | HEIGHT: 66 IN | SYSTOLIC BLOOD PRESSURE: 122 MMHG | HEART RATE: 69 BPM | WEIGHT: 192.8 LBS

## 2019-09-25 DIAGNOSIS — L03.116 CELLULITIS OF LEFT LOWER EXTREMITY: ICD-10-CM

## 2019-09-25 DIAGNOSIS — I80.9 PHLEBITIS: ICD-10-CM

## 2019-09-25 DIAGNOSIS — L03.116 CELLULITIS OF LEFT LOWER EXTREMITY: Primary | ICD-10-CM

## 2019-09-25 PROCEDURE — 99213 OFFICE O/P EST LOW 20 MIN: CPT | Performed by: FAMILY MEDICINE

## 2019-09-25 PROCEDURE — 93971 EXTREMITY STUDY: CPT | Performed by: SURGERY

## 2019-09-25 PROCEDURE — 93971 EXTREMITY STUDY: CPT

## 2019-09-25 RX ORDER — DOXYCYCLINE 100 MG/1
100 TABLET ORAL 2 TIMES DAILY
COMMUNITY
End: 2020-04-17

## 2019-09-25 RX ORDER — DOXYCYCLINE HYCLATE 100 MG
100 TABLET ORAL 2 TIMES DAILY
Qty: 22 TABLET | Refills: 0 | Status: SHIPPED | OUTPATIENT
Start: 2019-09-25 | End: 2019-10-06

## 2019-09-25 RX ORDER — AMOXICILLIN 875 MG/1
875 TABLET, COATED ORAL 2 TIMES DAILY
COMMUNITY
End: 2021-11-11

## 2019-09-25 NOTE — PROGRESS NOTES
FAMILY PRACTICE OFFICE VISIT       NAME: Sveta Toure  AGE: 81 y o  SEX: female       : 1950        MRN: [id number]        Assessment and Plan     Problem List Items Addressed This Visit     None      Visit Diagnoses     Cellulitis of left lower extremity    -  Primary    Relevant Medications    doxycycline hyclate (VIBRA-TABS) 100 mg tablet    Other Relevant Orders    VAS lower limb venous duplex study, unilateral/limited (Completed)    Phlebitis        Relevant Orders    VAS lower limb venous duplex study, unilateral/limited (Completed)       Patient presents for evaluation of erythematous rash left posterior knee  She is currently treated with dual antibiotic regimen consisting of doxycycline and amoxicillin, ordered by urgent care center  Given location of her erythema, will proceed with venous duplex of left lower extremity to rule out possibility of phlebitis  I am concerned about early Lyme disease  I advised patient to complete 5 days of amoxicillin and then stop antibiotic  Will extend course of doxycycline to 21 days  I explained patient that 3 weeks of doxycycline will provide her with adequate treatment for cellulitis and possible Lyme exposure  Patient will contact me if rash/erythema persists  Patient Instructions   · Please complete 21 day of doxycycline therapy, it will provide you with complete treatment for possible exposure to Lyme bacteria  · Please discontinue amoxicillin as of Friday night  · Will contact you regarding results of your leg ultrasound      Discussed with the patient and all questioned fully answered  She will call me if any problems arise  M*Modal software was used to dictate this note  It may contain errors with dictating incorrect words/spelling  Please contact provider directly with any questions         Chief Complaint     Chief Complaint   Patient presents with    Skin Problem     Red spot behind left knee- 1 week       History of Present Illness      Patient presents for evaluation of persistent erythematous rash behind left knee that she has noticed 1 week ago  Reportedly redness has become progressively worse by Sunday, September 22nd and patient presented to urgent care center for evaluation  She was seen by [facility] and was prescribed doxycycline 100 mg twice a day for 10 days  Patient denies symptoms of fever, generalized myalgias arthralgias, headaches, fatigue or any other associated symptoms  She denies pruritus or leg pain  Patient states that her rash has worsened next day, Monday, September 23rd  She was re-evaluated by urgent care center, they have added amoxicillin to her treatment course  Currently patient uses doxycycline 100 mg b i d  And amoxicillin 875 mg b i d   Patient states that rash has actually improved within past 48 hours  [place] 2 [place], driving  Patient is not on aspirin  Review of Systems   Review of Systems   Constitutional: Negative  Respiratory: Negative  Cardiovascular: Negative  Gastrointestinal: Negative  Genitourinary: Negative  Musculoskeletal: Negative  Negative for arthralgias  Skin: Positive for color change and rash (Below left knee)  Neurological: Negative  Active Problem List     Patient Active Problem List   Diagnosis    Hyperlipidemia    Finger pain, right    Melanoma in situ ([address] )    Hypovitaminosis D    Routine health maintenance    Cervical radiculitis    Closed displaced fracture of proximal phalanx of right middle finger    Basal cell adenocarcinoma    Encounter to discuss test results       Past Medical History:  Past Medical History:   Diagnosis Date    Basal cell adenocarcinoma 2011    Melanoma [facility]) 2010    Nephrolithiasis     Skin cancer, basal cell     right arm       Past Surgical History:  No past surgical history on file      Family History:  Family History   Problem Relation Age of Onset    Hyperlipidemia Mother     Colon cancer Family     Hypertension Family     Cancer Family         laryngeal    Colon cancer Father 80    Ovarian cancer Cousin 45    Pancreatic cancer Cousin 39    Prostate cancer Paternal Uncle 76       Social History:  Social History     Socioeconomic History    Marital status: /Civil Union     Spouse name: Not on file    Number of children: Not on file    Years of education: Not on file    Highest education level: Not on file   Occupational History    Not on file   Social Needs    Financial resource strain: Not on file    Food insecurity:     Worry: Not on file     Inability: Not on file    Transportation needs:     Medical: Not on file     Non-medical: Not on file   Tobacco Use    Smoking status: Never Smoker    Smokeless tobacco: Never Used   Substance and Sexual Activity    Alcohol use: Yes     Comment: social    Drug use: No    Sexual activity: Not on file   Lifestyle    Physical activity:     Days per week: Not on file     Minutes per session: Not on file    Stress: Not on file   Relationships    Social connections:     Talks on phone: Not on file     Gets together: Not on file     Attends Muslim service: Not on file     Active member of club or organization: Not on file     Attends meetings of clubs or organizations: Not on file     Relationship status: Not on file    Intimate partner violence:     Fear of current or ex partner: Not on file     Emotionally abused: Not on file     Physically abused: Not on file     Forced sexual activity: Not on file   Other Topics Concern    Not on file   Social History Narrative    Not on file     PHQ-9 Depression Screening    PHQ-9:    Frequency of the following problems over the past two weeks:                    Objective     Vitals:    09/25/19 0932   BP: 122/74   BP Location: Right arm   Patient Position: Sitting   Cuff Size: Adult   Pulse: 69   Temp: 97 6 °F (36 4 °C)   TempSrc: Tympanic SpO2: 95%   Weight: 87 5 kg (192 lb 12 8 oz)   Height: 5' 6" (1 676 m)     Wt Readings from Last 3 Encounters:   09/25/19 87 5 kg (192 lb 12 8 oz)   07/17/19 87 kg (191 lb 12 8 oz)   04/09/19 86 1 kg (189 lb 12 8 oz)       Physical Exam   Constitutional: She is oriented to person, place, and time  She appears well-developed and well-nourished  Musculoskeletal:        Legs:  Neurological: She is alert and oriented to person, place, and time  Skin: Skin is warm  There is erythema  Left  Posterior knee  : 4" x 8" erythema, fading, no warmth, no induration, no bull's eye rash  No calf tenderness or edema   Psychiatric: She has a normal mood and affect  Her behavior is normal    Nursing note and vitals reviewed  Pertinent Laboratory/Diagnostic Studies:  Lab Results   Component Value Date    GLUCOSE 82 01/04/2016    BUN 18 04/04/2019    CREATININE 0 77 04/04/2019    CALCIUM 8 7 04/04/2019     01/04/2016    K 4 1 04/04/2019    CO2 28 04/04/2019     04/04/2019     Lab Results   Component Value Date    ALT 30 04/04/2019    AST 28 04/04/2019    ALKPHOS 73 04/04/2019    BILITOT 0 37 06/25/2015       Lab Results   Component Value Date    WBC 6 20 04/04/2019    HGB 13 6 04/04/2019    HCT 42 9 04/04/2019    MCV 96 04/04/2019     04/04/2019       No results found for: TSH    Lab Results   Component Value Date    CHOL 286 01/04/2016     Lab Results   Component Value Date    TRIG 105 04/04/2019     Lab Results   Component Value Date    HDL 54 04/04/2019     Lab Results   Component Value Date    LDLCALC 163 (H) 04/04/2019     No results found for: HGBA1C    Results for orders placed or performed in visit on 04/04/19   CBC and differential   Result Value Ref Range    WBC 6 20 4  31 - 10 16 Thousand/uL    RBC 4 46 3 81 - 5 12 Million/uL    Hemoglobin 13 6 11 5 - 15 4 g/dL    Hematocrit 42 9 34 8 - 46 1 %    MCV 96 82 - 98 fL    MCH 30 5 26 8 - 34 3 pg    MCHC 31 7 31 4 - 37 4 g/dL    RDW 11 9 11 6 - 15 1 %    MPV 11 0 8 9 - 12 7 fL    Platelets 602 531 - 652 Thousands/uL    nRBC 0 /100 WBCs    Neutrophils Relative 70 43 - 75 %    Immat GRANS % 1 0 - 2 %    Lymphocytes Relative 17 14 - 44 %    Monocytes Relative 9 4 - 12 %    Eosinophils Relative 2 0 - 6 %    Basophils Relative 1 0 - 1 %    Neutrophils Absolute 4 33 1 85 - 7 62 Thousands/µL    Immature Grans Absolute 0 03 0 00 - 0 20 Thousand/uL    Lymphocytes Absolute 1 06 0 60 - 4 47 Thousands/µL    Monocytes Absolute 0 58 0 17 - 1 22 Thousand/µL    Eosinophils Absolute 0 14 0 00 - 0 61 Thousand/µL    Basophils Absolute 0 06 0 00 - 0 10 Thousands/µL   Lipid Panel with Direct LDL reflex   Result Value Ref Range    Cholesterol 238 (H) 50 - 200 mg/dL    Triglycerides 105 <=150 mg/dL    HDL, Direct 54 40 - 60 mg/dL    LDL Calculated 163 (H) 0 - 100 mg/dL   Comprehensive metabolic panel   Result Value Ref Range    Sodium 138 136 - 145 mmol/L    Potassium 4 1 3 5 - 5 3 mmol/L    Chloride 106 100 - 108 mmol/L    CO2 28 21 - 32 mmol/L    ANION GAP 4 4 - 13 mmol/L    BUN 18 5 - 25 mg/dL    Creatinine 0 77 0 60 - 1 30 mg/dL    Glucose, Fasting 95 65 - 99 mg/dL    Calcium 8 7 8 3 - 10 1 mg/dL    AST 28 5 - 45 U/L    ALT 30 12 - 78 U/L    Alkaline Phosphatase 73 46 - 116 U/L    Total Protein 7 2 6 4 - 8 2 g/dL    Albumin 3 8 3 5 - 5 0 g/dL    Total Bilirubin 0 62 0 20 - 1 00 mg/dL    eGFR 81 ml/min/1 73sq m   TSH, 3rd generation with Free T4 reflex   Result Value Ref Range    TSH 3RD GENERATON 2 190 0 358 - 3 740 uIU/mL       No orders of the defined types were placed in this encounter        ALLERGIES:  Allergies   Allergen Reactions    Ezetimibe-Simvastatin Headache    Pravastatin Myalgia    Rosuvastatin Myalgia    Simvastatin Other (See Comments)     Muscle Aches       Current Medications     Current Outpatient Medications   Medication Sig Dispense Refill    amoxicillin (AMOXIL) 875 mg tablet Take 875 mg by mouth 2 (two) times a day      atorvastatin (LIPITOR) 10 mg tablet TAKE ONE-HALF TABLET BY MOUTH ON MONDAY, WEDNESDAY, AND FRIDAY 30 tablet 3    cholecalciferol (VITAMIN D3) 90350 units capsule Take by mouth      Cyanocobalamin (B-12 PO) Take 1 tablet by mouth daily      doxycycline (ADOXA) 100 MG tablet Take 100 mg by mouth 2 (two) times a day      ezetimibe (ZETIA) 10 mg tablet TAKE ONE TABLET BY MOUTH EVERY DAY 90 tablet 3    FOLIC ACID PO Take 1 tablet by mouth daily      Multiple Vitamin (MULTIVITAMINS PO) Take 1 tablet by mouth daily      Calcium-Magnesium-Vitamin D (CALCIUM MAGNESIUM PO) Take 1 tablet by mouth daily      doxycycline hyclate (VIBRA-TABS) 100 mg tablet Take 1 tablet (100 mg total) by mouth 2 (two) times a day for 11 days 22 tablet 0     No current facility-administered medications for this visit  There are no discontinued medications      Health Maintenance     Health Maintenance   Topic Date Due    Hepatitis C Screening  1950    BMI: Followup Plan  07/11/1970    INFLUENZA VACCINE  07/01/2019    Fall Risk  10/02/2019    Depression Screening PHQ  10/02/2019    Urinary Incontinence Screening  10/02/2019    Medicare Annual Wellness Visit (AWV)  10/02/2019    BMI: Adult  09/25/2020    MAMMOGRAM  01/31/2021    DTaP,Tdap,and Td Vaccines (2 - Td) 06/30/2024    CRC Screening: Colonoscopy  09/16/2025    Pneumococcal Vaccine: 65+ Years  Completed    Pneumococcal Vaccine: Pediatrics (0 to 5 Years) and At-Risk Patients (6 to 59 Years)  Aged Out    HEPATITIS B VACCINES  Aged Out       Immunization History   Administered Date(s) Administered    Influenza TIV (IM) 09/13/2010, 01/13/2017    Influenza, high dose seasonal 0 5 mL 10/02/2018    Pneumococcal Conjugate 13-Valent 09/12/2017    Pneumococcal Polysaccharide PPV23 10/02/2018    Td (adult), adsorbed 05/04/2004    Tdap 06/30/2014       Roderick Frost MD

## 2019-09-25 NOTE — PATIENT INSTRUCTIONS
· Please complete 21 day of doxycycline therapy, it will provide you with complete treatment for possible exposure to Lyme bacteria  · Please discontinue amoxicillin as of Friday night  · Will contact you regarding results of your leg ultrasound

## 2019-09-26 ENCOUNTER — TELEPHONE (OUTPATIENT)
Dept: FAMILY MEDICINE CLINIC | Facility: CLINIC | Age: 69
End: 2019-09-26

## 2019-09-26 NOTE — TELEPHONE ENCOUNTER
----- Message from Deonte Pavon MD sent at 9/26/2019 12:29 PM EDT -----  Please contact patient  Left leg venous ultrasound was normal, no evidence of blood clots    Thank you

## 2019-09-30 ENCOUNTER — HOSPITAL ENCOUNTER (OUTPATIENT)
Dept: RADIOLOGY | Age: 69
Discharge: HOME | End: 2019-09-30
Payer: MEDICARE

## 2019-09-30 DIAGNOSIS — Z13.820 SCREENING FOR OSTEOPOROSIS: ICD-10-CM

## 2019-09-30 PROCEDURE — 77080 DXA BONE DENSITY AXIAL: CPT

## 2019-10-02 NOTE — RESULT ENCOUNTER NOTE
Can you please let patient know that her bone density showed osteopenia  I would like her to continue with muscle strengthening, weight-bearing exercises as well as intake of calcium and vitamin-D  Will continue to monitor her bone density every 2 years    Thank you

## 2019-10-03 ENCOUNTER — TELEPHONE (OUTPATIENT)
Dept: FAMILY MEDICINE CLINIC | Facility: CLINIC | Age: 69
End: 2019-10-03

## 2019-10-03 NOTE — TELEPHONE ENCOUNTER
----- Message from Alicia Leong MD sent at 10/2/2019  1:58 PM EDT -----  Can you please let patient know that her bone density showed osteopenia  I would like her to continue with muscle strengthening, weight-bearing exercises as well as intake of calcium and vitamin-D  Will continue to monitor her bone density every 2 years    Thank you

## 2019-10-04 ENCOUNTER — TELEPHONE (OUTPATIENT)
Dept: FAMILY MEDICINE CLINIC | Facility: CLINIC | Age: 69
End: 2019-10-04

## 2019-10-04 NOTE — TELEPHONE ENCOUNTER
----- Message from Sumanth Ewing MD sent at 10/2/2019  1:58 PM EDT -----  Can you please let patient know that her bone density showed osteopenia  I would like her to continue with muscle strengthening, weight-bearing exercises as well as intake of calcium and vitamin-D  Will continue to monitor her bone density every 2 years    Thank you

## 2019-10-07 ENCOUNTER — APPOINTMENT (OUTPATIENT)
Dept: LAB | Facility: CLINIC | Age: 69
End: 2019-10-07
Payer: MEDICARE

## 2019-10-07 ENCOUNTER — TRANSCRIBE ORDERS (OUTPATIENT)
Dept: LAB | Facility: CLINIC | Age: 69
End: 2019-10-07

## 2019-10-07 DIAGNOSIS — Z11.59 NEED FOR HEPATITIS C SCREENING TEST: ICD-10-CM

## 2019-10-07 DIAGNOSIS — E55.9 VITAMIN D DEFICIENCY: ICD-10-CM

## 2019-10-07 DIAGNOSIS — Z01.84 IMMUNITY STATUS TESTING: ICD-10-CM

## 2019-10-07 DIAGNOSIS — E78.5 HYPERLIPIDEMIA, UNSPECIFIED HYPERLIPIDEMIA TYPE: ICD-10-CM

## 2019-10-07 LAB
25(OH)D3 SERPL-MCNC: 26.1 NG/ML (ref 30–100)
ALBUMIN SERPL BCP-MCNC: 4.1 G/DL (ref 3.5–5)
ALP SERPL-CCNC: 73 U/L (ref 46–116)
ALT SERPL W P-5'-P-CCNC: 26 U/L (ref 12–78)
ANION GAP SERPL CALCULATED.3IONS-SCNC: 7 MMOL/L (ref 4–13)
AST SERPL W P-5'-P-CCNC: 17 U/L (ref 5–45)
BILIRUB SERPL-MCNC: 0.71 MG/DL (ref 0.2–1)
BUN SERPL-MCNC: 19 MG/DL (ref 5–25)
CALCIUM SERPL-MCNC: 9 MG/DL (ref 8.3–10.1)
CHLORIDE SERPL-SCNC: 107 MMOL/L (ref 100–108)
CHOLEST SERPL-MCNC: 305 MG/DL (ref 50–200)
CO2 SERPL-SCNC: 27 MMOL/L (ref 21–32)
CREAT SERPL-MCNC: 0.9 MG/DL (ref 0.6–1.3)
GFR SERPL CREATININE-BSD FRML MDRD: 66 ML/MIN/1.73SQ M
GLUCOSE P FAST SERPL-MCNC: 90 MG/DL (ref 65–99)
HCV AB SER QL: NORMAL
HDLC SERPL-MCNC: 50 MG/DL (ref 40–60)
LDLC SERPL CALC-MCNC: 230 MG/DL (ref 0–100)
POTASSIUM SERPL-SCNC: 4 MMOL/L (ref 3.5–5.3)
PROT SERPL-MCNC: 7.7 G/DL (ref 6.4–8.2)
SODIUM SERPL-SCNC: 141 MMOL/L (ref 136–145)
TRIGL SERPL-MCNC: 127 MG/DL

## 2019-10-07 PROCEDURE — 36415 COLL VENOUS BLD VENIPUNCTURE: CPT

## 2019-10-07 PROCEDURE — 80061 LIPID PANEL: CPT

## 2019-10-07 PROCEDURE — 86787 VARICELLA-ZOSTER ANTIBODY: CPT

## 2019-10-07 PROCEDURE — 80053 COMPREHEN METABOLIC PANEL: CPT

## 2019-10-07 PROCEDURE — 82306 VITAMIN D 25 HYDROXY: CPT

## 2019-10-07 PROCEDURE — 86803 HEPATITIS C AB TEST: CPT

## 2019-10-10 LAB — VZV IGG SER IA-ACNC: NORMAL

## 2019-10-11 ENCOUNTER — OFFICE VISIT (OUTPATIENT)
Dept: FAMILY MEDICINE CLINIC | Facility: CLINIC | Age: 69
End: 2019-10-11
Payer: MEDICARE

## 2019-10-11 VITALS
HEIGHT: 65 IN | WEIGHT: 191 LBS | RESPIRATION RATE: 16 BRPM | BODY MASS INDEX: 31.82 KG/M2 | SYSTOLIC BLOOD PRESSURE: 122 MMHG | TEMPERATURE: 95.7 F | HEART RATE: 68 BPM | DIASTOLIC BLOOD PRESSURE: 70 MMHG

## 2019-10-11 DIAGNOSIS — Z00.00 ROUTINE HEALTH MAINTENANCE: ICD-10-CM

## 2019-10-11 DIAGNOSIS — E78.5 HYPERLIPIDEMIA, UNSPECIFIED HYPERLIPIDEMIA TYPE: Primary | ICD-10-CM

## 2019-10-11 DIAGNOSIS — M25.561 RIGHT KNEE PAIN, UNSPECIFIED CHRONICITY: ICD-10-CM

## 2019-10-11 DIAGNOSIS — R53.83 FATIGUE, UNSPECIFIED TYPE: ICD-10-CM

## 2019-10-11 DIAGNOSIS — D03.9 MELANOMA IN SITU, UNSPECIFIED SITE (HCC): ICD-10-CM

## 2019-10-11 DIAGNOSIS — E55.9 VITAMIN D DEFICIENCY: ICD-10-CM

## 2019-10-11 PROCEDURE — 99214 OFFICE O/P EST MOD 30 MIN: CPT | Performed by: FAMILY MEDICINE

## 2019-10-11 PROCEDURE — G0439 PPPS, SUBSEQ VISIT: HCPCS | Performed by: FAMILY MEDICINE

## 2019-10-11 NOTE — PROGRESS NOTES
Assessment and Plan:     Problem List Items Addressed This Visit        Other    Hyperlipidemia - Primary    Relevant Orders    Lipid Panel with Direct LDL reflex    Comprehensive metabolic panel    TSH, 3rd generation with Free T4 reflex    CT coronary calcium score    Echo complete with contrast if indicated    Echo stress test w contrast if indicated    Melanoma in situ ([facility] [address] )    Relevant Orders    CBC and differential    Routine health maintenance      Other Visit Diagnoses     Vitamin D deficiency        Fatigue, unspecified type        Relevant Orders    CBC and differential    TSH, 3rd generation with Free T4 reflex    Right knee pain, unspecified chronicity        Relevant Orders    XR knee 3 vw right non injury           Preventive health issues were discussed with patient, and age appropriate screening tests were ordered as noted in patient's After Visit Summary  Personalized health advice and appropriate referrals for health education or preventive services given if needed, as noted in patient's After Visit Summary  History of Present Illness:     Patient presents for Medicare Annual Wellness visit    Patient Care Team:  Alicia Leong MD as PCP - General Radha Aceves DO (Orthopedic Surgery)     Problem List:     Patient Active Problem List   Diagnosis    Hyperlipidemia    Finger pain, right    Melanoma in situ ([facility] [address] )    Hypovitaminosis D    Routine health maintenance    Cervical radiculitis    Closed displaced fracture of proximal phalanx of right middle finger    Basal cell adenocarcinoma    Encounter to discuss test results      Past Medical and Surgical History:     Past Medical History:   Diagnosis Date    Basal cell adenocarcinoma 2011    Melanoma ([facility] [address] ) 2010    Nephrolithiasis     Skin cancer, basal cell     right arm     History reviewed  No pertinent surgical history     Family History:     Family History   Problem Relation Age of Onset    Hyperlipidemia Mother     Colon cancer Family     Hypertension Family     Cancer Family         laryngeal    Colon cancer Father 80    Ovarian cancer Cousin 45    Pancreatic cancer Cousin 39    Prostate cancer Paternal Uncle 76      Social History:     Social History     Socioeconomic History    Marital status: /Civil Union     Spouse name: None    Number of children: None    Years of education: None    Highest education level: None   Occupational History    None   Social Needs    Financial resource strain: None    Food insecurity:     Worry: None     Inability: None    Transportation needs:     Medical: None     Non-medical: None   Tobacco Use    Smoking status: Never Smoker    Smokeless tobacco: Never Used   Substance and Sexual Activity    Alcohol use: Yes     Comment: social    Drug use: No    Sexual activity: None   Lifestyle    Physical activity:     Days per week: None     Minutes per session: None    Stress: None   Relationships    Social connections:     Talks on phone: None     Gets together: None     Attends Hindu service: None     Active member of club or organization: None     Attends meetings of clubs or organizations: None     Relationship status: None    Intimate partner violence:     Fear of current or ex partner: None     Emotionally abused: None     Physically abused: None     Forced sexual activity: None   Other Topics Concern    None   Social History Narrative    None       Medications and Allergies:     Current Outpatient Medications   Medication Sig Dispense Refill    atorvastatin (LIPITOR) 10 mg tablet TAKE ONE-HALF TABLET BY MOUTH ON MONDAY, WEDNESDAY, AND FRIDAY 30 tablet 3    Calcium-Magnesium-Vitamin D (CALCIUM MAGNESIUM PO) Take 1 tablet by mouth daily      cholecalciferol (VITAMIN D3) 55161 units capsule Take by mouth      Cyanocobalamin (B-12 PO) Take 1 tablet by mouth daily      doxycycline (ADOXA) 100 MG tablet Take 100 mg by mouth 2 (two) times a day      ezetimibe (ZETIA) 10 mg tablet TAKE ONE TABLET BY MOUTH EVERY DAY 90 tablet 3    FOLIC ACID PO Take 1 tablet by mouth daily      Multiple Vitamin (MULTIVITAMINS PO) Take 1 tablet by mouth daily      amoxicillin (AMOXIL) 875 mg tablet Take 875 mg by mouth 2 (two) times a day       No current facility-administered medications for this visit  Allergies   Allergen Reactions    Ezetimibe-Simvastatin Headache    Pravastatin Myalgia    Rosuvastatin Myalgia    Simvastatin Other (See Comments)     Muscle Aches      Immunizations:     Immunization History   Administered Date(s) Administered    Influenza TIV (IM) 09/13/2010, 01/13/2017    Influenza, high dose seasonal 0 5 mL 10/02/2018    Pneumococcal Conjugate 13-Valent 09/12/2017    Pneumococcal Polysaccharide PPV23 10/02/2018    Td (adult), adsorbed 05/04/2004    Tdap 06/30/2014      Health Maintenance:         Topic Date Due    MAMMOGRAM  01/31/2021    CRC Screening: Colonoscopy  09/16/2025    Hepatitis C Screening  Completed         Topic Date Due    INFLUENZA VACCINE  07/01/2019      Medicare Health Risk Assessment:     /70 (BP Location: Left arm, Patient Position: Sitting, Cuff Size: Standard)   Pulse 68   Temp (!) 95 7 °F (35 4 °C) (Tympanic)   Resp 16   Ht 5' 4 95" (1 65 m)   Wt 86 6 kg (191 lb)   BMI 31 83 kg/m²      Cheyenne Montiel is here for her Subsequent Wellness visit  Last Medicare Wellness visit information reviewed, patient interviewed and updates made to the record today  Health Risk Assessment:   Patient rates overall health as very good  Patient feels that their physical health rating is same  Eyesight was rated as same  Hearing was rated as same  Patient feels that their emotional and mental health rating is same  Pain experienced in the last 7 days has been some  Patient's pain rating has been 7/10  Depression Screening:   PHQ-2 Score: 0      Fall Risk Screening:    In the past year, patient has experienced: no history of falling in past year Urinary Incontinence Screening:   Patient has not leaked urine accidently in the last six months  Home Safety:  Patient does not have trouble with stairs inside or outside of their home  Patient has working smoke alarms and has working carbon monoxide detector  Home safety hazards include: none  Nutrition:   Current diet is Low Cholesterol  Medications:   Patient is currently taking over-the-counter supplements  OTC medications include: see medication list  Patient is able to manage medications  Activities of Daily Living (ADLs)/Instrumental Activities of Daily Living (IADLs):   Walk and transfer into and out of bed and chair?: Yes  Dress and groom yourself?: Yes    Bathe or shower yourself?: Yes    Feed yourself? Yes  Do your laundry/housekeeping?: Yes  Manage your money, pay your bills and track your expenses?: Yes  Make your own meals?: Yes    Do your own shopping?: Yes    Previous Hospitalizations:   Any hospitalizations or ED visits within the last 12 months?: No      Advance Care Planning:   Living will: Yes    Durable POA for healthcare:  Yes    Advanced directive: Yes      PREVENTIVE SCREENINGS      Cardiovascular Screening:    General: Screening Not Indicated and History Lipid Disorder      Diabetes Screening:     General: Screening Current      Colorectal Cancer Screening:     General: Screening Current      Breast Cancer Screening:     General: Screening Current      Cervical Cancer Screening:    General: Screening Not Indicated      Osteoporosis Screening:    General: Screening Current      Abdominal Aortic Aneurysm (AAA) Screening:        General: Screening Not Indicated      Lung Cancer Screening:     General: Screening Not Indicated      Hepatitis C Screening:    General: Screening Current      Pricila Lo MD

## 2019-10-11 NOTE — PROGRESS NOTES
FAMILY PRACTICE OFFICE VISIT       NAME: Fahad Hankins  AGE: 81 y o  SEX: female       : 1950        MRN: [id number]    DATE: 10/13/2019  TIME: 5:36 PM    Assessment and Plan     Problem List Items Addressed This Visit        Other    Hyperlipidemia - Primary    Relevant Orders    Lipid Panel with Direct LDL reflex    Comprehensive metabolic panel    TSH, 3rd generation with Free T4 reflex    CT coronary calcium score    Echo complete with contrast if indicated    Echo stress test w contrast if indicated    Melanoma in situ ([address] )    Relevant Orders    CBC and differential    Routine health maintenance      Other Visit Diagnoses     Vitamin D deficiency        Fatigue, unspecified type        Relevant Orders    CBC and differential    TSH, 3rd generation with Free T4 reflex    Right knee pain, unspecified chronicity        Relevant Orders    XR knee 3 vw right non injury         Hyperlipidemia:  Reviewed recent lipid panel, which has increased  Patient did stop her atorvastatin which she was taking Monday/Wednesday/Friday as she was experiencing right-sided knee pain  Patient does not feel it was due to the statin medication as knee pain does persist   I would like her to resume statin  She will continue with Zetia  She will continue with lifestyle modifications as well  She will also start ubiquinol 100 mg twice daily  Continue with walking  Because of history of elevated cholesterol, I would like to also check CT calcium score, echo, echo stress test   POC EKG with NSR  No prior EKG available for comparison  Nonspecific changes noted however  No acute ischemia noted  Melanoma in situ:    Followed closely by Dr Woodward  Vitamin-D deficiency:    Patient takes vitamin-D 10,000 international units weekly  Have recommended taking 2 000 international units daily in addition to which she takes for the winter months  Right knee pain:  Unclear etiology  Exam WNL today    Will start off by obtaining x-ray  She will continue to see acupuncturist as this is helping with her pain  May benefit from physical therapy as well  If symptoms do persist, may benefit from orthopedic evaluation  Routine health maintenance:    Up-to-date with Prevnar, Pneumovax, Tdap vaccines  Have discussed the new shingles vaccine  She will inquire with her insurance company regarding coverage  Up-to-date with mammogram   Up-to-date with DEXA scan in September  Have provided another Rx for this  Up-to-date with colonoscopy on 09/2015 with recommended repeat in 5 years  Patient Instructions       Medicare Preventive Visit Patient Instructions  Thank you for completing your Welcome to Medicare Visit or Medicare Annual Wellness Visit today  Your next wellness visit will be due in one year (10/11/2020)  The screening/preventive services that you may require over the next 5-10 years are detailed below  Some tests may not apply to you based off risk factors and/or age  Screening tests ordered at today's visit but not completed yet may show as past due  Also, please note that scanned in results may not display below  Preventive Screenings:  Service Recommendations Previous Testing/Comments   Colorectal Cancer Screening  * Colonoscopy    * Fecal Occult Blood Test (FOBT)/Fecal Immunochemical Test (FIT)  * Fecal DNA/Cologuard Test  * Flexible Sigmoidoscopy Age: 54-65 years old   Colonoscopy: every 10 years (may be performed more frequently if at higher risk)  OR  FOBT/FIT: every 1 year  OR  Cologuard: every 3 years  OR  Sigmoidoscopy: every 5 years  Screening may be recommended earlier than age 48 if at higher risk for colorectal cancer  Also, an individualized decision between you and your healthcare provider will decide whether screening between the ages of 74-80 would be appropriate   Colonoscopy: 09/16/2015  FOBT/FIT: Not on file  Cologuard: Not on file  Sigmoidoscopy: Not on file    Screening Current     Breast Cancer Screening Age: 36 years old  Frequency: every 1-2 years  Not required if history of left and right mastectomy Mammogram: 01/31/2019    Screening Current   Cervical Cancer Screening Between the ages of 21-29, pap smear recommended once every 3 years  Between the ages of 33-67, can perform pap smear with HPV co-testing every 5 years  Recommendations may differ for women with a history of total hysterectomy, cervical cancer, or abnormal pap smears in past  Pap Smear: Not on file    Screening Not Indicated   Hepatitis C Screening Once for adults born between 1945 and 1965  More frequently in patients at high risk for Hepatitis C Hep C Antibody: 10/07/2019    Screening Current   Diabetes Screening 1-2 times per year if you're at risk for diabetes or have pre-diabetes Fasting glucose: 90 mg/dL   A1C: No results in last 5 years    Screening Current   Cholesterol Screening Once every 5 years if you don't have a lipid disorder  May order more often based on risk factors  Lipid panel: 10/07/2019    Screening Not Indicated  History Lipid Disorder     Other Preventive Screenings Covered by Medicare:  1  Abdominal Aortic Aneurysm (AAA) Screening: covered once if your at risk  You're considered to be at risk if you have a family history of AAA  2  Lung Cancer Screening: covers low dose CT scan once per year if you meet all of the following conditions: (1) Age 50-69; (2) No signs or symptoms of lung cancer; (3) Current smoker or have quit smoking within the last 15 years; (4) You have a tobacco smoking history of at least 30 pack years (packs per day multiplied by number of years you smoked); (5) You get a written order from a healthcare provider  3  Glaucoma Screening: covered annually if you're considered high risk: (1) You have diabetes OR (2) Family history of glaucoma OR (3)  aged 48 and older OR (3)  American aged 72 and older  3   Osteoporosis Screening: covered every 2 years if you meet one of the following conditions: (1) You're estrogen deficient and at risk for osteoporosis based off medical history and other findings; (2) Have a vertebral abnormality; (3) On glucocorticoid therapy for more than 3 months; (4) Have primary hyperparathyroidism; (5) On osteoporosis medications and need to assess response to drug therapy  · Last bone density test (DXA Scan): 09/30/2019   5  HIV Screening: covered annually if you're between the age of 15-65  Also covered annually if you are younger than 13 and older than 72 with risk factors for HIV infection  For pregnant patients, it is covered up to 3 times per pregnancy  Immunizations:  Immunization Recommendations   Influenza Vaccine Annual influenza vaccination during flu season is recommended for all persons aged >= 6 months who do not have contraindications   Pneumococcal Vaccine (Prevnar and Pneumovax)  * Prevnar = PCV13  * Pneumovax = PPSV23   Adults 25-60 years old: 1-3 doses may be recommended based on certain risk factors  Adults 72 years old: Prevnar (PCV13) vaccine recommended followed by Pneumovax (PPSV23) vaccine  If already received PPSV23 since turning 65, then PCV13 recommended at least one year after PPSV23 dose  Hepatitis B Vaccine 3 dose series if at intermediate or high risk (ex: diabetes, end stage renal disease, liver disease)   Tetanus (Td) Vaccine - COST NOT COVERED BY MEDICARE PART B Following completion of primary series, a booster dose should be given every 10 years to maintain immunity against tetanus  Td may also be given as tetanus wound prophylaxis  Tdap Vaccine - COST NOT COVERED BY MEDICARE PART B Recommended at least once for all adults  For pregnant patients, recommended with each pregnancy     Shingles Vaccine (Shingrix) - COST NOT COVERED BY MEDICARE PART B  2 shot series recommended in those aged 48 and above     Health Maintenance Due:      Topic Date Due    MAMMOGRAM  01/31/2021    CRC Screening: Colonoscopy 09/16/2025    Hepatitis C Screening  Completed     Immunizations Due:      Topic Date Due    INFLUENZA VACCINE  07/01/2019     Advance Directives   What are advance directives? Advance directives are legal documents that state your wishes and plans for medical care  These plans are made ahead of time in case you lose your ability to make decisions for yourself  Advance directives can apply to any medical decision, such as the treatments you want, and if you want to donate organs  What are the types of advance directives? There are many types of advance directives, and each state has rules about how to use them  You may choose a combination of any of the following:  · Living will: This is a written record of the treatment you want  You can also choose which treatments you do not want, which to limit, and which to stop at a certain time  This includes surgery, medicine, IV fluid, and tube feedings  · Durable power of  for healthcare [facility]): This is a written record that states who you want to make healthcare choices for you when you are unable to make them for yourself  This person, called a proxy, is usually a family member or a friend  You may choose more than 1 proxy  · Do not resuscitate (DNR) order:  A DNR order is used in case your heart stops beating or you stop breathing  It is a request not to have certain forms of treatment, such as CPR  A DNR order may be included in other types of advance directives  · Medical directive: This covers the care that you want if you are in a coma, near death, or unable to make decisions for yourself  You can list the treatments you want for each condition  Treatment may include pain medicine, surgery, blood transfusions, dialysis, IV or tube feedings, and a ventilator (breathing machine)  · Values history: This document has questions about your views, beliefs, and how you feel and think about life   This information can help others choose the care that you would choose  Why are advance directives important? An advance directive helps you control your care  Although spoken wishes may be used, it is better to have your wishes written down  Spoken wishes can be misunderstood, or not followed  Treatments may be given even if you do not want them  An advance directive may make it easier for your family to make difficult choices about your care  Weight Management   Why it is important to manage your weight:  Being overweight increases your risk of health conditions such as heart disease, high blood pressure, type 2 diabetes, and certain types of cancer  It can also increase your risk for osteoarthritis, sleep apnea, and other respiratory problems  Aim for a slow, steady weight loss  Even a small amount of weight loss can lower your risk of health problems  How to lose weight safely:  A safe and healthy way to lose weight is to eat fewer calories and get regular exercise  You can lose up about 1 pound a week by decreasing the number of calories you eat by 500 calories each day  Healthy meal plan for weight management:  A healthy meal plan includes a variety of foods, contains fewer calories, and helps you stay healthy  A healthy meal plan includes the following:  · Eat whole-grain foods more often  A healthy meal plan should contain fiber  Fiber is the part of grains, fruits, and vegetables that is not broken down by your body  Whole-grain foods are healthy and provide extra fiber in your diet  Some examples of whole-grain foods are whole-wheat breads and pastas, oatmeal, brown rice, and bulgur  · Eat a variety of vegetables every day  Include dark, leafy greens such as spinach, kale, mauri greens, and mustard greens  Eat yellow and orange vegetables such as carrots, sweet potatoes, and winter squash  · Eat a variety of fruits every day  Choose fresh or canned fruit (canned in its own juice or light syrup) instead of juice   Fruit juice has very little or no fiber  · Eat low-fat dairy foods  Drink fat-free (skim) milk or 1% milk  Eat fat-free yogurt and low-fat cottage cheese  Try low-fat cheeses such as mozzarella and other reduced-fat cheeses  · Choose meat and other protein foods that are low in fat  Choose beans or other legumes such as split peas or lentils  Choose fish, skinless poultry (chicken or turkey), or lean cuts of red meat (beef or pork)  Before you cook meat or poultry, cut off any visible fat  · Use less fat and oil  Try baking foods instead of frying them  Add less fat, such as margarine, sour cream, regular salad dressing and mayonnaise to foods  Eat fewer high-fat foods  Some examples of high-fat foods include french fries, doughnuts, ice cream, and cakes  · Eat fewer sweets  Limit foods and drinks that are high in sugar  This includes candy, cookies, regular soda, and sweetened drinks  Exercise:  Exercise at least 30 minutes per day on most days of the week  Some examples of exercise include walking, biking, dancing, and swimming  You can also fit in more physical activity by taking the stairs instead of the elevator or parking farther away from stores  Ask your healthcare provider about the best exercise plan for you  © Copyright [employer] 2018 Information is for End User's use only and may not be sold, redistributed or otherwise used for commercial purposes  All illustrations and images included in CareNotes® are the copyrighted property of A D A M , Inc  or [address] [place] [address]            Chief Complaint     Chief Complaint   Patient presents with    Knee Pain     Right side    Medicare Wellness Visit     subsequent       History of Present Illness     HPI   26-year-old female here for routine follow-up of chronic conditions and discuss recent blood work results  Patient was started on doxycycline possible Lyme infection located in the posterior left knee  Patient's rash is improved    She will complete the entire course of doxycycline of 21 days this coming Sunday  Patient states she has had right knee pain for the past 2 weeks  Denies any injury  Pain is located side of her needs  Has been seeing acupuncturist the [facility] for the right knee pain  Goes once a week, yesterday had cupping  Feels acupuncture and cupping have helped  Pain is worsened after she has been sitting for a while and she gets up  Sometimes she feels it is going to crack if she takes for Staph on her right leg  Patient also has, going down the stairs aggravates her pain  Patient states she continues to walk regularly however  Denies swelling, redness, warmth  Patient felt her pain may have been due to statin medication and stopped it 3 weeks ago however pain does continue  She will restart her statin medication  Patient takes Vit 10, 000 twice weekly  Will take extra 2000 iu daily for the winter months  Review of Systems   Review of Systems   Constitutional: Negative for activity change and unexpected weight change  Respiratory: Negative for shortness of breath  Cardiovascular: Negative  Gastrointestinal: Negative for abdominal pain and constipation  Musculoskeletal:        Right knee pain   Neurological: Negative for dizziness and light-headedness  Psychiatric/Behavioral: Negative for dysphoric mood  Active Problem List     Patient Active Problem List   Diagnosis    Hyperlipidemia    Finger pain, right    [address]anoma in situ [address] [address] )    Hypovitaminosis D    Routine health maintenance    Cervical radiculitis    Closed displaced fracture of proximal phalanx of right middle finger    Basal cell adenocarcinoma    Encounter to discuss test results       Past Medical History:  Past Medical History:   Diagnosis Date    Basal cell adenocarcinoma 2011    Melanoma [facility]) 2010    Nephrolithiasis     Skin cancer, basal cell     right arm       Past Surgical History:  History reviewed   No pertinent surgical history  Family History:  Family History   Problem Relation Age of Onset    Hyperlipidemia Mother     Colon cancer Family     Hypertension Family     Cancer Family         laryngeal    Colon cancer Father 80    Ovarian cancer Cousin 45    Pancreatic cancer Cousin 39    Prostate cancer Paternal Uncle 76       Social History:  Social History     Socioeconomic History    Marital status: /Civil Union     Spouse name: Not on file    Number of children: Not on file    Years of education: Not on file    Highest education level: Not on file   Occupational History    Not on file   Social Needs    Financial resource strain: Not on file    Food insecurity:     Worry: Not on file     Inability: Not on file    Transportation needs:     Medical: Not on file     Non-medical: Not on file   Tobacco Use    Smoking status: Never Smoker    Smokeless tobacco: Never Used   Substance and Sexual Activity    Alcohol use: Yes     Comment: social    Drug use: No    Sexual activity: Not on file   Lifestyle    Physical activity:     Days per week: Not on file     Minutes per session: Not on file    Stress: Not on file   Relationships    Social connections:     Talks on phone: Not on file     Gets together: Not on file     Attends Confucianism service: Not on file     Active member of club or organization: Not on file     Attends meetings of clubs or organizations: Not on file     Relationship status: Not on file    Intimate partner violence:     Fear of current or ex partner: Not on file     Emotionally abused: Not on file     Physically abused: Not on file     Forced sexual activity: Not on file   Other Topics Concern    Not on file   Social History Narrative    Not on file     I have reviewed the patient's medical history in detail; there are no changes to the history as noted in the electronic medical record      Objective     Vitals:    10/11/19 0739   BP: 122/70   Pulse: 68   Resp: 16   Temp: (!) 95 7 °F (35 4 °C)     Wt Readings from Last 3 Encounters:   10/11/19 86 6 kg (191 lb)   09/25/19 87 5 kg (192 lb 12 8 oz)   07/17/19 87 kg (191 lb 12 8 oz)     PHQ-9 Depression Screening    PHQ-9:    Frequency of the following problems over the past two weeks:       Little interest or pleasure in doing things:  0 - not at all  Feeling down, depressed, or hopeless:  0 - not at all  PHQ-2 Score:  0           Physical Exam   Constitutional: She appears well-developed and well-nourished  HENT:   Head: Normocephalic and atraumatic  Mouth/Throat: Oropharynx is clear and moist    TMs intact and clear   Eyes: Pupils are equal, round, and reactive to light  Conjunctivae and EOM are normal    Neck: Normal range of motion  Neck supple  No thyromegaly present  Cardiovascular: Normal rate, regular rhythm and normal heart sounds  Pulmonary/Chest: Effort normal and breath sounds normal    Abdominal: Soft  Bowel sounds are normal  She exhibits no distension  There is no tenderness  Musculoskeletal: Normal range of motion  She exhibits no edema  Full range of motion of right knee  No edema, erythema or warmth noted  Lymphadenopathy:     She has no cervical adenopathy  Neurological: She is alert  She exhibits normal muscle tone  Psychiatric: She has a normal mood and affect  Nursing note and vitals reviewed        Pertinent Laboratory/Diagnostic Studies:  Lab Results   Component Value Date    GLUCOSE 82 01/04/2016    BUN 19 10/07/2019    CREATININE 0 90 10/07/2019    CALCIUM 9 0 10/07/2019     01/04/2016    K 4 0 10/07/2019    CO2 27 10/07/2019     10/07/2019     Lab Results   Component Value Date    ALT 26 10/07/2019    AST 17 10/07/2019    ALKPHOS 73 10/07/2019    BILITOT 0 37 06/25/2015       Lab Results   Component Value Date    WBC 6 20 04/04/2019    HGB 13 6 04/04/2019    HCT 42 9 04/04/2019    MCV 96 04/04/2019     04/04/2019       No results found for: TSH    Lab Results   Component Value Date CHOL 286 01/04/2016     Lab Results   Component Value Date    TRIG 127 10/07/2019     Lab Results   Component Value Date    HDL 50 10/07/2019     Lab Results   Component Value Date    LDLCALC 230 (H) 10/07/2019     No results found for: HGBA1C    Results for orders placed or performed in visit on 10/07/19   Lipid Panel with Direct LDL reflex   Result Value Ref Range    Cholesterol 305 (H) 50 - 200 mg/dL    Triglycerides 127 <=150 mg/dL    HDL, Direct 50 40 - 60 mg/dL    LDL Calculated 230 (H) 0 - 100 mg/dL   Comprehensive metabolic panel   Result Value Ref Range    Sodium 141 136 - 145 mmol/L    Potassium 4 0 3 5 - 5 3 mmol/L    Chloride 107 100 - 108 mmol/L    CO2 27 21 - 32 mmol/L    ANION GAP 7 4 - 13 mmol/L    BUN 19 5 - 25 mg/dL    Creatinine 0 90 0 60 - 1 30 mg/dL    Glucose, Fasting 90 65 - 99 mg/dL    Calcium 9 0 8 3 - 10 1 mg/dL    AST 17 5 - 45 U/L    ALT 26 12 - 78 U/L    Alkaline Phosphatase 73 46 - 116 U/L    Total Protein 7 7 6 4 - 8 2 g/dL    Albumin 4 1 3 5 - 5 0 g/dL    Total Bilirubin 0 71 0 20 - 1 00 mg/dL    eGFR 66 ml/min/1 73sq m   Hepatitis C antibody   Result Value Ref Range    Hepatitis C Ab Non-reactive Non-reactive   Varicella zoster antibody, IgG   Result Value Ref Range    Varicella IgG IMMUNE IMMUNE   Vitamin D 25 hydroxy   Result Value Ref Range    Vit D, 25-Hydroxy 26 1 (L) 30 0 - 100 0 ng/mL       Orders Placed This Encounter   Procedures    XR knee 3 vw right non injury    CT coronary calcium score    Lipid Panel with Direct LDL reflex    CBC and differential    Comprehensive metabolic panel    TSH, 3rd generation with Free T4 reflex    Echo stress test w contrast if indicated    Echo complete with contrast if indicated       ALLERGIES:  Allergies   Allergen Reactions    Ezetimibe-Simvastatin Headache    Pravastatin Myalgia    Rosuvastatin Myalgia    Simvastatin Other (See Comments)     Muscle Aches       Current Medications     Current Outpatient Medications Medication Sig Dispense Refill    atorvastatin (LIPITOR) 10 mg tablet TAKE ONE-HALF TABLET BY MOUTH ON MONDAY, WEDNESDAY, AND FRIDAY 30 tablet 3    Calcium-Magnesium-Vitamin D (CALCIUM MAGNESIUM PO) Take 1 tablet by mouth daily      cholecalciferol (VITAMIN D3) 41097 units capsule Take by mouth      Cyanocobalamin (B-12 PO) Take 1 tablet by mouth daily      doxycycline (ADOXA) 100 MG tablet Take 100 mg by mouth 2 (two) times a day      ezetimibe (ZETIA) 10 mg tablet TAKE ONE TABLET BY MOUTH EVERY DAY 90 tablet 3    FOLIC ACID PO Take 1 tablet by mouth daily      Multiple Vitamin (MULTIVITAMINS PO) Take 1 tablet by mouth daily      amoxicillin (AMOXIL) 875 mg tablet Take 875 mg by mouth 2 (two) times a day       No current facility-administered medications for this visit            Health Maintenance     Health Maintenance   Topic Date Due    BMI: Followup Plan  07/11/1970    INFLUENZA VACCINE  07/01/2019    Medicare Annual Wellness Visit (AWV)  10/02/2019    Fall Risk  10/11/2020    Depression Screening PHQ  10/11/2020    Urinary Incontinence Screening  10/11/2020    BMI: Adult  10/11/2020    MAMMOGRAM  01/31/2021    DTaP,Tdap,and Td Vaccines (2 - Td) 06/30/2024    CRC Screening: Colonoscopy  09/16/2025    Hepatitis C Screening  Completed    Pneumococcal Vaccine: 65+ Years  Completed    Pneumococcal Vaccine: Pediatrics (0 to 5 Years) and At-Risk Patients (6 to 59 Years)  Aged Out    HEPATITIS B VACCINES  Aged Out     Immunization History   Administered Date(s) Administered    Influenza TIV (IM) 09/13/2010, 01/13/2017    Influenza, high dose seasonal 0 5 mL 10/02/2018    Pneumococcal Conjugate 13-Valent 09/12/2017    Pneumococcal Polysaccharide PPV23 10/02/2018    Td (adult), adsorbed 05/04/2004    Tdap 06/30/2014       Blane Mcfarlane MD

## 2019-10-11 NOTE — PATIENT INSTRUCTIONS
Medicare Preventive Visit Patient Instructions  Thank you for completing your Welcome to Medicare Visit or Medicare Annual Wellness Visit today  Your next wellness visit will be due in one year (10/11/2020)  The screening/preventive services that you may require over the next 5-10 years are detailed below  Some tests may not apply to you based off risk factors and/or age  Screening tests ordered at today's visit but not completed yet may show as past due  Also, please note that scanned in results may not display below  Preventive Screenings:  Service Recommendations Previous Testing/Comments   Colorectal Cancer Screening  * Colonoscopy    * Fecal Occult Blood Test (FOBT)/Fecal Immunochemical Test (FIT)  * Fecal DNA/Cologuard Test  * Flexible Sigmoidoscopy Age: 54-65 years old   Colonoscopy: every 10 years (may be performed more frequently if at higher risk)  OR  FOBT/FIT: every 1 year  OR  Cologuard: every 3 years  OR  Sigmoidoscopy: every 5 years  Screening may be recommended earlier than age 48 if at higher risk for colorectal cancer  Also, an individualized decision between you and your healthcare provider will decide whether screening between the ages of 74-80 would be appropriate  Colonoscopy: 09/16/2015  FOBT/FIT: Not on file  Cologuard: Not on file  Sigmoidoscopy: Not on file    Screening Current     Breast Cancer Screening Age: [address] years old  Frequency: every 1-2 years  Not required if history of left and right mastectomy Mammogram: 01/31/2019    Screening Current   Cervical Cancer Screening Between the ages of 21-29, pap smear recommended once every 3 years  Between the ages of 33-67, can perform pap smear with HPV co-testing every 5 years     Recommendations may differ for women with a history of total hysterectomy, cervical cancer, or abnormal pap smears in past  Pap Smear: Not on file    Screening Not Indicated   Hepatitis C Screening Once for adults born between 1945 and 1965  More frequently in patients at high risk for Hepatitis C Hep C Antibody: 10/07/2019    Screening Current   Diabetes Screening 1-2 times per year if you're at risk for diabetes or have pre-diabetes Fasting glucose: 90 mg/dL   A1C: No results in last 5 years    Screening Current   Cholesterol Screening Once every 5 years if you don't have a lipid disorder  May order more often based on risk factors  Lipid panel: 10/07/2019    Screening Not Indicated  History Lipid Disorder     Other Preventive Screenings Covered by Medicare:  1  Abdominal Aortic Aneurysm (AAA) Screening: covered once if your at risk  You're considered to be at risk if you have a family history of AAA  2  Lung Cancer Screening: covers low dose CT scan once per year if you meet all of the following conditions: (1) Age 50-69; (2) No signs or symptoms of lung cancer; (3) Current smoker or have quit smoking within the last 15 years; (4) You have a tobacco smoking history of at least 30 pack years (packs per day multiplied by number of years you smoked); (5) You get a written order from a healthcare provider  3  Glaucoma Screening: covered annually if you're considered high risk: (1) You have diabetes OR (2) Family history of glaucoma OR (3)  aged 48 and older OR (3)  American aged 72 and older  3  Osteoporosis Screening: covered every 2 years if you meet one of the following conditions: (1) You're estrogen deficient and at risk for osteoporosis based off medical history and other findings; (2) Have a vertebral abnormality; (3) On glucocorticoid therapy for more than 3 months; (4) Have primary hyperparathyroidism; (5) On osteoporosis medications and need to assess response to drug therapy  · Last bone density test (DXA Scan): 09/30/2019   5  HIV Screening: covered annually if you're between the age of 15-65  Also covered annually if you are younger than 13 and older than 72 with risk factors for HIV infection   For pregnant patients, it is covered up to 3 times per pregnancy  Immunizations:  Immunization Recommendations   Influenza Vaccine Annual influenza vaccination during flu season is recommended for all persons aged >= 6 months who do not have contraindications   Pneumococcal Vaccine (Prevnar and Pneumovax)  * Prevnar = PCV13  * Pneumovax = PPSV23   Adults 25-60 years old: 1-3 doses may be recommended based on certain risk factors  Adults 72 years old: Prevnar (PCV13) vaccine recommended followed by Pneumovax (PPSV23) vaccine  If already received PPSV23 since turning 65, then PCV13 recommended at least one year after PPSV23 dose  Hepatitis B Vaccine 3 dose series if at intermediate or high risk (ex: diabetes, end stage renal disease, liver disease)   Tetanus (Td) Vaccine - COST NOT COVERED BY MEDICARE PART B Following completion of primary series, a booster dose should be given every 10 years to maintain immunity against tetanus  Td may also be given as tetanus wound prophylaxis  Tdap Vaccine - COST NOT COVERED BY MEDICARE PART B Recommended at least once for all adults  For pregnant patients, recommended with each pregnancy  Shingles Vaccine (Shingrix) - COST NOT COVERED BY MEDICARE PART B  2 shot series recommended in those aged 48 and above     Health Maintenance Due:      Topic Date Due    MAMMOGRAM  01/31/2021    CRC Screening: Colonoscopy  09/16/2025    Hepatitis C Screening  Completed     Immunizations Due:      Topic Date Due    INFLUENZA VACCINE  07/01/2019     Advance Directives   What are advance directives? Advance directives are legal documents that state your wishes and plans for medical care  These plans are made ahead of time in case you lose your ability to make decisions for yourself  Advance directives can apply to any medical decision, such as the treatments you want, and if you want to donate organs  What are the types of advance directives?   There are many types of advance directives, and each state has rules about how to use them  You may choose a combination of any of the following:  · Living will: This is a written record of the treatment you want  You can also choose which treatments you do not want, which to limit, and which to stop at a certain time  This includes surgery, medicine, IV fluid, and tube feedings  · Durable power of  for healthcare [facility]): This is a written record that states who you want to make healthcare choices for you when you are unable to make them for yourself  This person, called a proxy, is usually a family member or a friend  You may choose more than 1 proxy  · Do not resuscitate (DNR) order:  A DNR order is used in case your heart stops beating or you stop breathing  It is a request not to have certain forms of treatment, such as CPR  A DNR order may be included in other types of advance directives  · Medical directive: This covers the care that you want if you are in a coma, near death, or unable to make decisions for yourself  You can list the treatments you want for each condition  Treatment may include pain medicine, surgery, blood transfusions, dialysis, IV or tube feedings, and a ventilator (breathing machine)  · Values history: This document has questions about your views, beliefs, and how you feel and think about life  This information can help others choose the care that you would choose  Why are advance directives important? An advance directive helps you control your care  Although spoken wishes may be used, it is better to have your wishes written down  Spoken wishes can be misunderstood, or not followed  Treatments may be given even if you do not want them  An advance directive may make it easier for your family to make difficult choices about your care     Weight Management   Why it is important to manage your weight:  Being overweight increases your risk of health conditions such as heart disease, high blood pressure, type 2 diabetes, and certain types of cancer  It can also increase your risk for osteoarthritis, sleep apnea, and other respiratory problems  Aim for a slow, steady weight loss  Even a small amount of weight loss can lower your risk of health problems  How to lose weight safely:  A safe and healthy way to lose weight is to eat fewer calories and get regular exercise  You can lose up about 1 pound a week by decreasing the number of calories you eat by 500 calories each day  Healthy meal plan for weight management:  A healthy meal plan includes a variety of foods, contains fewer calories, and helps you stay healthy  A healthy meal plan includes the following:  · Eat whole-grain foods more often  A healthy meal plan should contain fiber  Fiber is the part of grains, fruits, and vegetables that is not broken down by your body  Whole-grain foods are healthy and provide extra fiber in your diet  Some examples of whole-grain foods are whole-wheat breads and pastas, oatmeal, brown rice, and bulgur  · Eat a variety of vegetables every day  Include dark, leafy greens such as spinach, kale, mauri greens, and mustard greens  Eat yellow and orange vegetables such as carrots, sweet potatoes, and winter squash  · Eat a variety of fruits every day  Choose fresh or canned fruit (canned in its own juice or light syrup) instead of juice  Fruit juice has very little or no fiber  · Eat low-fat dairy foods  Drink fat-free (skim) milk or 1% milk  Eat fat-free yogurt and low-fat cottage cheese  Try low-fat cheeses such as mozzarella and other reduced-fat cheeses  · Choose meat and other protein foods that are low in fat  Choose beans or other legumes such as split peas or lentils  Choose fish, skinless poultry (chicken or turkey), or lean cuts of red meat (beef or pork)  Before you cook meat or poultry, cut off any visible fat  · Use less fat and oil  Try baking foods instead of frying them   Add less fat, such as margarine, sour cream, regular salad dressing and mayonnaise to foods  Eat fewer high-fat foods  Some examples of high-fat foods include french fries, doughnuts, ice cream, and cakes  · Eat fewer sweets  Limit foods and drinks that are high in sugar  This includes candy, cookies, regular soda, and sweetened drinks  Exercise:  Exercise at least 30 minutes per day on most days of the week  Some examples of exercise include walking, biking, dancing, and swimming  You can also fit in more physical activity by taking the stairs instead of the elevator or parking farther away from stores  Ask your healthcare provider about the best exercise plan for you  © Copyright [employer] 2018 Information is for End User's use only and may not be sold, redistributed or otherwise used for commercial purposes   All illustrations and images included in CareNotes® are the copyrighted property of [employer]  or [address] [place] [address] [employer]

## 2019-10-14 ENCOUNTER — HOSPITAL ENCOUNTER (OUTPATIENT)
Dept: RADIOLOGY | Facility: HOSPITAL | Age: 69
Discharge: HOME | End: 2019-10-14
Payer: MEDICARE

## 2019-10-14 DIAGNOSIS — M25.561 RIGHT KNEE PAIN, UNSPECIFIED CHRONICITY: ICD-10-CM

## 2019-10-14 PROCEDURE — 73562 X-RAY EXAM OF KNEE 3: CPT

## 2019-10-21 ENCOUNTER — TELEPHONE (OUTPATIENT)
Dept: FAMILY MEDICINE CLINIC | Facility: CLINIC | Age: 69
End: 2019-10-21

## 2019-10-21 NOTE — RESULT ENCOUNTER NOTE
Can you please let patient know that her right knee x-ray shows mild osteoarthritis  This might be contributing to her pain  If she is interested in physical therapy, can you please provide her with number? She may continue to see acupuncturist as well  If symptoms do worsen, I would like her to let me know, in which case I may refer to Orthopedics    Thank you

## 2019-11-04 ENCOUNTER — IMMUNIZATIONS (OUTPATIENT)
Dept: FAMILY MEDICINE CLINIC | Facility: CLINIC | Age: 69
End: 2019-11-04
Payer: MEDICARE

## 2019-11-04 DIAGNOSIS — Z23 FLU VACCINE NEED: Primary | ICD-10-CM

## 2019-11-04 DIAGNOSIS — E78.5 HYPERLIPIDEMIA, UNSPECIFIED HYPERLIPIDEMIA TYPE: ICD-10-CM

## 2019-11-04 PROCEDURE — G0008 ADMIN INFLUENZA VIRUS VAC: HCPCS

## 2019-11-04 PROCEDURE — 90662 IIV NO PRSV INCREASED AG IM: CPT

## 2019-11-04 RX ORDER — ATORVASTATIN CALCIUM 10 MG/1
TABLET, FILM COATED ORAL
Qty: 30 TABLET | Refills: 3 | Status: SHIPPED | OUTPATIENT
Start: 2019-11-04 | End: 2020-10-26 | Stop reason: SDUPTHER

## 2019-11-07 ENCOUNTER — HOSPITAL ENCOUNTER (OUTPATIENT)
Dept: RADIOLOGY | Facility: HOSPITAL | Age: 69
Discharge: HOME | End: 2019-11-07
Payer: COMMERCIAL

## 2019-11-07 DIAGNOSIS — E78.5 HYPERLIPIDEMIA, UNSPECIFIED HYPERLIPIDEMIA TYPE: ICD-10-CM

## 2019-11-15 NOTE — RESULT ENCOUNTER NOTE
Please let patient know that her coronary calcium score was 3 which was low  Can you ask her if she has restarted her atorvastatin?   Thank you

## 2019-12-03 ENCOUNTER — HOSPITAL ENCOUNTER (OUTPATIENT)
Dept: NON INVASIVE DIAGNOSTICS | Facility: CLINIC | Age: 69
Discharge: HOME | End: 2019-12-03
Payer: MEDICARE

## 2019-12-03 DIAGNOSIS — E78.5 HYPERLIPIDEMIA, UNSPECIFIED HYPERLIPIDEMIA TYPE: ICD-10-CM

## 2019-12-03 LAB
CHEST PAIN STATEMENT: NORMAL
MAX DIASTOLIC BP: 82 MMHG
MAX HEART RATE: 169 BPM
MAX PREDICTED HEART RATE: 153 BPM
MAX. SYSTOLIC BP: 174 MMHG
PROTOCOL NAME: NORMAL
TARGET HR FORMULA: NORMAL
TEST INDICATION: NORMAL
TIME IN EXERCISE PHASE: NORMAL

## 2019-12-03 PROCEDURE — 93351 STRESS TTE COMPLETE: CPT | Performed by: INTERNAL MEDICINE

## 2019-12-03 PROCEDURE — 93325 DOPPLER ECHO COLOR FLOW MAPG: CPT

## 2019-12-03 PROCEDURE — 93351 STRESS TTE COMPLETE: CPT

## 2019-12-03 PROCEDURE — 93320 DOPPLER ECHO COMPLETE: CPT

## 2019-12-03 PROCEDURE — 93306 TTE W/DOPPLER COMPLETE: CPT | Performed by: INTERNAL MEDICINE

## 2019-12-06 ENCOUNTER — TELEPHONE (OUTPATIENT)
Dept: FAMILY MEDICINE CLINIC | Facility: CLINIC | Age: 69
End: 2019-12-06

## 2019-12-06 NOTE — RESULT ENCOUNTER NOTE
Please let patient know that her echo stress test was normal   Her echocardiogram was also overall normal however her right atrium which is 1 of the 4 chambers of her heart was mildly increased in size  I am not sure of the significance of this however because this was detected, I would like her to be further evaluated by Cardiology  Can you please provide patient with number for [facility]?   Thank you

## 2019-12-06 NOTE — TELEPHONE ENCOUNTER
----- Message from Lorrie Cooley MD sent at 12/6/2019  3:36 PM EST -----  Please let patient know that her echo stress test was normal   Her echocardiogram was also overall normal however her right atrium which is 1 of the 4 chambers of her heart was mildly increased in size  I am not sure of the significance of this however because this was detected, I would like her to be further evaluated by Cardiology  Can you please provide patient with number for [facility]?   Thank you

## 2019-12-20 DIAGNOSIS — E78.5 HYPERLIPIDEMIA, UNSPECIFIED HYPERLIPIDEMIA TYPE: ICD-10-CM

## 2019-12-20 RX ORDER — EZETIMIBE 10 MG/1
TABLET ORAL
Qty: 90 TABLET | Refills: 3 | Status: SHIPPED | OUTPATIENT
Start: 2019-12-20 | End: 2021-02-19

## 2020-04-15 ENCOUNTER — TELEMEDICINE (OUTPATIENT)
Dept: FAMILY MEDICINE CLINIC | Facility: CLINIC | Age: 70
End: 2020-04-15
Payer: MEDICARE

## 2020-04-15 VITALS
TEMPERATURE: 97.7 F | DIASTOLIC BLOOD PRESSURE: 94 MMHG | BODY MASS INDEX: 32.61 KG/M2 | OXYGEN SATURATION: 95 % | SYSTOLIC BLOOD PRESSURE: 141 MMHG | HEART RATE: 73 BPM | WEIGHT: 191 LBS | HEIGHT: 64 IN

## 2020-04-15 DIAGNOSIS — E78.5 HYPERLIPIDEMIA, UNSPECIFIED HYPERLIPIDEMIA TYPE: Primary | ICD-10-CM

## 2020-04-15 DIAGNOSIS — Z00.00 ROUTINE HEALTH MAINTENANCE: ICD-10-CM

## 2020-04-15 DIAGNOSIS — R03.0 ELEVATED BLOOD PRESSURE READING: ICD-10-CM

## 2020-04-15 DIAGNOSIS — E55.9 VITAMIN D DEFICIENCY: ICD-10-CM

## 2020-04-15 DIAGNOSIS — D03.9 MELANOMA IN SITU, UNSPECIFIED SITE (HCC): ICD-10-CM

## 2020-04-15 PROCEDURE — 99214 OFFICE O/P EST MOD 30 MIN: CPT | Performed by: FAMILY MEDICINE

## 2020-04-15 RX ORDER — SODIUM CHLORIDE 1000 MG
1 TABLET, SOLUBLE MISCELLANEOUS 3 TIMES DAILY
COMMUNITY
End: 2021-11-16

## 2020-04-16 ENCOUNTER — TELEPHONE (OUTPATIENT)
Dept: FAMILY MEDICINE CLINIC | Facility: CLINIC | Age: 70
End: 2020-04-16

## 2020-04-17 ENCOUNTER — OFFICE VISIT (OUTPATIENT)
Dept: FAMILY MEDICINE CLINIC | Facility: CLINIC | Age: 70
End: 2020-04-17
Payer: MEDICARE

## 2020-04-17 VITALS
SYSTOLIC BLOOD PRESSURE: 118 MMHG | OXYGEN SATURATION: 98 % | WEIGHT: 193.13 LBS | BODY MASS INDEX: 32.97 KG/M2 | DIASTOLIC BLOOD PRESSURE: 80 MMHG | HEART RATE: 67 BPM | TEMPERATURE: 97.6 F | HEIGHT: 64 IN

## 2020-04-17 DIAGNOSIS — Z01.30 BLOOD PRESSURE CHECK: Primary | ICD-10-CM

## 2020-04-17 PROCEDURE — 1036F TOBACCO NON-USER: CPT | Performed by: FAMILY MEDICINE

## 2020-04-17 PROCEDURE — 3008F BODY MASS INDEX DOCD: CPT | Performed by: FAMILY MEDICINE

## 2020-04-17 PROCEDURE — 4040F PNEUMOC VAC/ADMIN/RCVD: CPT | Performed by: FAMILY MEDICINE

## 2020-04-17 PROCEDURE — 1160F RVW MEDS BY RX/DR IN RCRD: CPT | Performed by: FAMILY MEDICINE

## 2020-04-17 PROCEDURE — 3074F SYST BP LT 130 MM HG: CPT | Performed by: FAMILY MEDICINE

## 2020-04-17 PROCEDURE — 99213 OFFICE O/P EST LOW 20 MIN: CPT | Performed by: FAMILY MEDICINE

## 2020-04-17 PROCEDURE — 3079F DIAST BP 80-89 MM HG: CPT | Performed by: FAMILY MEDICINE

## 2020-04-21 ENCOUNTER — LAB (OUTPATIENT)
Dept: LAB | Facility: CLINIC | Age: 70
End: 2020-04-21
Payer: MEDICARE

## 2020-04-21 DIAGNOSIS — D03.9 MELANOMA IN SITU, UNSPECIFIED SITE (HCC): ICD-10-CM

## 2020-04-21 DIAGNOSIS — E78.5 HYPERLIPIDEMIA, UNSPECIFIED HYPERLIPIDEMIA TYPE: ICD-10-CM

## 2020-04-21 DIAGNOSIS — R53.83 FATIGUE, UNSPECIFIED TYPE: ICD-10-CM

## 2020-04-21 LAB
ALBUMIN SERPL BCP-MCNC: 3.8 G/DL (ref 3.5–5)
ALP SERPL-CCNC: 76 U/L (ref 46–116)
ALT SERPL W P-5'-P-CCNC: 30 U/L (ref 12–78)
ANION GAP SERPL CALCULATED.3IONS-SCNC: 2 MMOL/L (ref 4–13)
AST SERPL W P-5'-P-CCNC: 14 U/L (ref 5–45)
BASOPHILS # BLD AUTO: 0.07 THOUSANDS/ΜL (ref 0–0.1)
BASOPHILS NFR BLD AUTO: 1 % (ref 0–1)
BILIRUB SERPL-MCNC: 0.6 MG/DL (ref 0.2–1)
BUN SERPL-MCNC: 18 MG/DL (ref 5–25)
CALCIUM SERPL-MCNC: 9.3 MG/DL (ref 8.3–10.1)
CHLORIDE SERPL-SCNC: 109 MMOL/L (ref 100–108)
CHOLEST SERPL-MCNC: 247 MG/DL (ref 50–200)
CO2 SERPL-SCNC: 29 MMOL/L (ref 21–32)
CREAT SERPL-MCNC: 0.87 MG/DL (ref 0.6–1.3)
EOSINOPHIL # BLD AUTO: 0.28 THOUSAND/ΜL (ref 0–0.61)
EOSINOPHIL NFR BLD AUTO: 4 % (ref 0–6)
ERYTHROCYTE [DISTWIDTH] IN BLOOD BY AUTOMATED COUNT: 11.9 % (ref 11.6–15.1)
GFR SERPL CREATININE-BSD FRML MDRD: 69 ML/MIN/1.73SQ M
GLUCOSE P FAST SERPL-MCNC: 87 MG/DL (ref 65–99)
HCT VFR BLD AUTO: 45.9 % (ref 34.8–46.1)
HDLC SERPL-MCNC: 46 MG/DL
HGB BLD-MCNC: 14.6 G/DL (ref 11.5–15.4)
IMM GRANULOCYTES # BLD AUTO: 0.02 THOUSAND/UL (ref 0–0.2)
IMM GRANULOCYTES NFR BLD AUTO: 0 % (ref 0–2)
LDLC SERPL CALC-MCNC: 170 MG/DL (ref 0–100)
LYMPHOCYTES # BLD AUTO: 1.61 THOUSANDS/ΜL (ref 0.6–4.47)
LYMPHOCYTES NFR BLD AUTO: 20 % (ref 14–44)
MCH RBC QN AUTO: 29.9 PG (ref 26.8–34.3)
MCHC RBC AUTO-ENTMCNC: 31.8 G/DL (ref 31.4–37.4)
MCV RBC AUTO: 94 FL (ref 82–98)
MONOCYTES # BLD AUTO: 0.79 THOUSAND/ΜL (ref 0.17–1.22)
MONOCYTES NFR BLD AUTO: 10 % (ref 4–12)
NEUTROPHILS # BLD AUTO: 5.16 THOUSANDS/ΜL (ref 1.85–7.62)
NEUTS SEG NFR BLD AUTO: 65 % (ref 43–75)
NRBC BLD AUTO-RTO: 0 /100 WBCS
PLATELET # BLD AUTO: 273 THOUSANDS/UL (ref 149–390)
PMV BLD AUTO: 10.4 FL (ref 8.9–12.7)
POTASSIUM SERPL-SCNC: 4.5 MMOL/L (ref 3.5–5.3)
PROT SERPL-MCNC: 7.4 G/DL (ref 6.4–8.2)
RBC # BLD AUTO: 4.88 MILLION/UL (ref 3.81–5.12)
SODIUM SERPL-SCNC: 140 MMOL/L (ref 136–145)
TRIGL SERPL-MCNC: 154 MG/DL
TSH SERPL DL<=0.05 MIU/L-ACNC: 3.48 UIU/ML (ref 0.36–3.74)
WBC # BLD AUTO: 7.93 THOUSAND/UL (ref 4.31–10.16)

## 2020-04-21 PROCEDURE — 80061 LIPID PANEL: CPT

## 2020-04-21 PROCEDURE — 36415 COLL VENOUS BLD VENIPUNCTURE: CPT

## 2020-04-21 PROCEDURE — 85025 COMPLETE CBC W/AUTO DIFF WBC: CPT

## 2020-04-21 PROCEDURE — 84443 ASSAY THYROID STIM HORMONE: CPT

## 2020-04-21 PROCEDURE — 80053 COMPREHEN METABOLIC PANEL: CPT

## 2020-04-28 ENCOUNTER — TELEPHONE (OUTPATIENT)
Dept: FAMILY MEDICINE CLINIC | Facility: CLINIC | Age: 70
End: 2020-04-28

## 2020-09-15 ENCOUNTER — TELEPHONE (OUTPATIENT)
Dept: FAMILY MEDICINE CLINIC | Facility: CLINIC | Age: 70
End: 2020-09-15

## 2020-09-15 NOTE — TELEPHONE ENCOUNTER
Patient would like to have blood work scripts ordered and mailed to her home, she has scheduled a follow up appointment for the end of October

## 2020-09-17 DIAGNOSIS — E78.5 HYPERLIPIDEMIA, UNSPECIFIED HYPERLIPIDEMIA TYPE: Primary | ICD-10-CM

## 2020-10-20 ENCOUNTER — LAB (OUTPATIENT)
Dept: LAB | Facility: CLINIC | Age: 70
End: 2020-10-20
Payer: MEDICARE

## 2020-10-20 ENCOUNTER — TRANSCRIBE ORDERS (OUTPATIENT)
Dept: LAB | Facility: CLINIC | Age: 70
End: 2020-10-20

## 2020-10-20 DIAGNOSIS — E78.5 HYPERLIPIDEMIA, UNSPECIFIED HYPERLIPIDEMIA TYPE: ICD-10-CM

## 2020-10-20 LAB
ALBUMIN SERPL BCP-MCNC: 4 G/DL (ref 3.5–5)
ALP SERPL-CCNC: 82 U/L (ref 46–116)
ALT SERPL W P-5'-P-CCNC: 44 U/L (ref 12–78)
ANION GAP SERPL CALCULATED.3IONS-SCNC: 5 MMOL/L (ref 4–13)
AST SERPL W P-5'-P-CCNC: 21 U/L (ref 5–45)
BILIRUB SERPL-MCNC: 0.62 MG/DL (ref 0.2–1)
BUN SERPL-MCNC: 15 MG/DL (ref 5–25)
CALCIUM SERPL-MCNC: 8.8 MG/DL (ref 8.3–10.1)
CHLORIDE SERPL-SCNC: 109 MMOL/L (ref 100–108)
CHOLEST SERPL-MCNC: 259 MG/DL (ref 50–200)
CO2 SERPL-SCNC: 27 MMOL/L (ref 21–32)
CREAT SERPL-MCNC: 0.82 MG/DL (ref 0.6–1.3)
GFR SERPL CREATININE-BSD FRML MDRD: 74 ML/MIN/1.73SQ M
GLUCOSE P FAST SERPL-MCNC: 86 MG/DL (ref 65–99)
HDLC SERPL-MCNC: 55 MG/DL
LDLC SERPL CALC-MCNC: 171 MG/DL (ref 0–100)
POTASSIUM SERPL-SCNC: 4.2 MMOL/L (ref 3.5–5.3)
PROT SERPL-MCNC: 7.9 G/DL (ref 6.4–8.2)
SODIUM SERPL-SCNC: 141 MMOL/L (ref 136–145)
TRIGL SERPL-MCNC: 164 MG/DL

## 2020-10-20 PROCEDURE — 80061 LIPID PANEL: CPT

## 2020-10-20 PROCEDURE — 36415 COLL VENOUS BLD VENIPUNCTURE: CPT

## 2020-10-20 PROCEDURE — 80053 COMPREHEN METABOLIC PANEL: CPT

## 2020-10-26 ENCOUNTER — OFFICE VISIT (OUTPATIENT)
Dept: FAMILY MEDICINE CLINIC | Facility: CLINIC | Age: 70
End: 2020-10-26
Payer: MEDICARE

## 2020-10-26 VITALS
DIASTOLIC BLOOD PRESSURE: 78 MMHG | TEMPERATURE: 97.2 F | OXYGEN SATURATION: 95 % | BODY MASS INDEX: 33.14 KG/M2 | HEIGHT: 64 IN | SYSTOLIC BLOOD PRESSURE: 130 MMHG | WEIGHT: 194.13 LBS | HEART RATE: 79 BPM | RESPIRATION RATE: 18 BRPM

## 2020-10-26 DIAGNOSIS — D03.9 MELANOMA IN SITU, UNSPECIFIED SITE (HCC): ICD-10-CM

## 2020-10-26 DIAGNOSIS — R53.83 FATIGUE, UNSPECIFIED TYPE: ICD-10-CM

## 2020-10-26 DIAGNOSIS — Z12.11 COLON CANCER SCREENING: ICD-10-CM

## 2020-10-26 DIAGNOSIS — E55.9 VITAMIN D DEFICIENCY: ICD-10-CM

## 2020-10-26 DIAGNOSIS — E78.5 HYPERLIPIDEMIA, UNSPECIFIED HYPERLIPIDEMIA TYPE: Primary | ICD-10-CM

## 2020-10-26 PROCEDURE — G0439 PPPS, SUBSEQ VISIT: HCPCS | Performed by: FAMILY MEDICINE

## 2020-10-26 PROCEDURE — 1123F ACP DISCUSS/DSCN MKR DOCD: CPT | Performed by: FAMILY MEDICINE

## 2020-10-26 PROCEDURE — 99214 OFFICE O/P EST MOD 30 MIN: CPT | Performed by: FAMILY MEDICINE

## 2020-10-26 RX ORDER — ATORVASTATIN CALCIUM 10 MG/1
TABLET, FILM COATED ORAL
Qty: 120 TABLET | Refills: 3 | Status: SHIPPED | OUTPATIENT
Start: 2020-10-26 | End: 2021-11-20

## 2020-12-02 ENCOUNTER — TELEMEDICINE (OUTPATIENT)
Dept: FAMILY MEDICINE CLINIC | Facility: CLINIC | Age: 70
End: 2020-12-02
Payer: MEDICARE

## 2020-12-02 DIAGNOSIS — B02.9 HERPES ZOSTER WITHOUT COMPLICATION: Primary | ICD-10-CM

## 2020-12-02 PROCEDURE — 99213 OFFICE O/P EST LOW 20 MIN: CPT | Performed by: NURSE PRACTITIONER

## 2020-12-02 RX ORDER — VALACYCLOVIR HYDROCHLORIDE 1 G/1
1000 TABLET, FILM COATED ORAL 3 TIMES DAILY
Qty: 21 TABLET | Refills: 0 | Status: SHIPPED | OUTPATIENT
Start: 2020-12-02 | End: 2021-11-11

## 2021-02-19 DIAGNOSIS — E78.5 HYPERLIPIDEMIA, UNSPECIFIED HYPERLIPIDEMIA TYPE: ICD-10-CM

## 2021-02-19 RX ORDER — EZETIMIBE 10 MG/1
TABLET ORAL
Qty: 90 TABLET | Refills: 3 | Status: SHIPPED | OUTPATIENT
Start: 2021-02-19 | End: 2022-03-09

## 2021-03-10 DIAGNOSIS — Z23 ENCOUNTER FOR IMMUNIZATION: ICD-10-CM

## 2021-04-21 ENCOUNTER — LAB (OUTPATIENT)
Dept: LAB | Facility: CLINIC | Age: 71
End: 2021-04-21
Payer: MEDICARE

## 2021-04-21 DIAGNOSIS — R53.83 FATIGUE, UNSPECIFIED TYPE: ICD-10-CM

## 2021-04-21 DIAGNOSIS — E55.9 VITAMIN D DEFICIENCY: ICD-10-CM

## 2021-04-21 DIAGNOSIS — E78.5 HYPERLIPIDEMIA, UNSPECIFIED HYPERLIPIDEMIA TYPE: ICD-10-CM

## 2021-04-21 LAB
25(OH)D3 SERPL-MCNC: 33.9 NG/ML (ref 30–100)
ALBUMIN SERPL BCP-MCNC: 3.7 G/DL (ref 3.5–5)
ALP SERPL-CCNC: 79 U/L (ref 46–116)
ALT SERPL W P-5'-P-CCNC: 33 U/L (ref 12–78)
ANION GAP SERPL CALCULATED.3IONS-SCNC: 3 MMOL/L (ref 4–13)
AST SERPL W P-5'-P-CCNC: 15 U/L (ref 5–45)
BASOPHILS # BLD AUTO: 0.07 THOUSANDS/ΜL (ref 0–0.1)
BASOPHILS NFR BLD AUTO: 1 % (ref 0–1)
BILIRUB SERPL-MCNC: 0.62 MG/DL (ref 0.2–1)
BUN SERPL-MCNC: 14 MG/DL (ref 5–25)
CALCIUM SERPL-MCNC: 9 MG/DL (ref 8.3–10.1)
CHLORIDE SERPL-SCNC: 110 MMOL/L (ref 100–108)
CHOLEST SERPL-MCNC: 222 MG/DL (ref 50–200)
CO2 SERPL-SCNC: 27 MMOL/L (ref 21–32)
CREAT SERPL-MCNC: 0.88 MG/DL (ref 0.6–1.3)
EOSINOPHIL # BLD AUTO: 0.25 THOUSAND/ΜL (ref 0–0.61)
EOSINOPHIL NFR BLD AUTO: 3 % (ref 0–6)
ERYTHROCYTE [DISTWIDTH] IN BLOOD BY AUTOMATED COUNT: 12.1 % (ref 11.6–15.1)
GFR SERPL CREATININE-BSD FRML MDRD: 68 ML/MIN/1.73SQ M
GLUCOSE P FAST SERPL-MCNC: 86 MG/DL (ref 65–99)
HCT VFR BLD AUTO: 44 % (ref 34.8–46.1)
HDLC SERPL-MCNC: 52 MG/DL
HGB BLD-MCNC: 14.1 G/DL (ref 11.5–15.4)
IMM GRANULOCYTES # BLD AUTO: 0.02 THOUSAND/UL (ref 0–0.2)
IMM GRANULOCYTES NFR BLD AUTO: 0 % (ref 0–2)
LDLC SERPL CALC-MCNC: 144 MG/DL (ref 0–100)
LYMPHOCYTES # BLD AUTO: 1.36 THOUSANDS/ΜL (ref 0.6–4.47)
LYMPHOCYTES NFR BLD AUTO: 18 % (ref 14–44)
MCH RBC QN AUTO: 30.3 PG (ref 26.8–34.3)
MCHC RBC AUTO-ENTMCNC: 32 G/DL (ref 31.4–37.4)
MCV RBC AUTO: 95 FL (ref 82–98)
MONOCYTES # BLD AUTO: 0.74 THOUSAND/ΜL (ref 0.17–1.22)
MONOCYTES NFR BLD AUTO: 10 % (ref 4–12)
NEUTROPHILS # BLD AUTO: 5.28 THOUSANDS/ΜL (ref 1.85–7.62)
NEUTS SEG NFR BLD AUTO: 68 % (ref 43–75)
NRBC BLD AUTO-RTO: 0 /100 WBCS
PLATELET # BLD AUTO: 284 THOUSANDS/UL (ref 149–390)
PMV BLD AUTO: 10.3 FL (ref 8.9–12.7)
POTASSIUM SERPL-SCNC: 4.2 MMOL/L (ref 3.5–5.3)
PROT SERPL-MCNC: 7.7 G/DL (ref 6.4–8.2)
RBC # BLD AUTO: 4.65 MILLION/UL (ref 3.81–5.12)
SODIUM SERPL-SCNC: 140 MMOL/L (ref 136–145)
TRIGL SERPL-MCNC: 131 MG/DL
TSH SERPL DL<=0.05 MIU/L-ACNC: 3.38 UIU/ML (ref 0.36–3.74)
WBC # BLD AUTO: 7.72 THOUSAND/UL (ref 4.31–10.16)

## 2021-04-21 PROCEDURE — 84443 ASSAY THYROID STIM HORMONE: CPT

## 2021-04-21 PROCEDURE — 80053 COMPREHEN METABOLIC PANEL: CPT

## 2021-04-21 PROCEDURE — 85025 COMPLETE CBC W/AUTO DIFF WBC: CPT

## 2021-04-21 PROCEDURE — 80061 LIPID PANEL: CPT

## 2021-04-21 PROCEDURE — 82306 VITAMIN D 25 HYDROXY: CPT

## 2021-04-21 PROCEDURE — 36415 COLL VENOUS BLD VENIPUNCTURE: CPT

## 2021-04-26 ENCOUNTER — OFFICE VISIT (OUTPATIENT)
Dept: FAMILY MEDICINE CLINIC | Facility: CLINIC | Age: 71
End: 2021-04-26
Payer: MEDICARE

## 2021-04-26 DIAGNOSIS — E55.9 VITAMIN D DEFICIENCY: ICD-10-CM

## 2021-04-26 DIAGNOSIS — E78.5 HYPERLIPIDEMIA, UNSPECIFIED HYPERLIPIDEMIA TYPE: Primary | ICD-10-CM

## 2021-04-26 DIAGNOSIS — Z78.0 MENOPAUSE: ICD-10-CM

## 2021-04-26 DIAGNOSIS — D03.9 MELANOMA IN SITU, UNSPECIFIED SITE (HCC): ICD-10-CM

## 2021-04-26 DIAGNOSIS — Z00.00 ROUTINE HEALTH MAINTENANCE: ICD-10-CM

## 2021-04-26 PROCEDURE — 99214 OFFICE O/P EST MOD 30 MIN: CPT | Performed by: FAMILY MEDICINE

## 2021-04-26 NOTE — PROGRESS NOTES
FAMILY PRACTICE OFFICE VISIT       NAME: Scott Hardin  AGE: 78 y o  SEX: female       : 1950        MRN: [id number]    DATE: 2021  TIME: 1:54 PM    Assessment and Plan     Problem List Items Addressed This Visit        Other    Hyperlipidemia - Primary    Relevant Orders    Comprehensive metabolic panel    Melanoma in situ ([facility] [address] )    Vitamin D deficiency    Relevant Orders    DXA bone density spine hip and pelvis    Routine health maintenance      Other Visit Diagnoses     Menopause        Relevant Orders    DXA bone density spine hip and pelvis        Hyperlipidemia:  Reviewed recent lipid panel results  Total cholesterol has improved  Patient currently takes half tablet of Lipitor 10 mg daily as well as Zetia  Continue with lifestyle modifications  She also takes ubiquinol  Melanoma in situ:  Followed closely by Dr Woodward  Has a follow-up appointment in July  Vitamin-D deficiency:  Patient takes vitamin-D 10,000 international units twice weekly  Have recommended taking 2 000 international units daily in addition in the winter months       Routine health maintenance:  Up-to-date with Prevnar, Pneumovax, Tdap vaccines  Have discussed the new shingles vaccine at last upon  She will inquire with her insurance company regarding coverage  Up-to-date with mammogram 21  Rx for DEXA scan provided  Up-to-date with colonoscopy done by Zhanna Chen on 12/10/2020 with recommended repeat in 3 years  BMI Counseling: Body mass index is 33 19 kg/m²  The BMI is above normal  Nutrition recommendations include increasing intake of lean protein  Exercise recommendations include moderate aerobic physical activity for 150 minutes/week  There are no Patient Instructions on file for this visit          Chief Complaint     Chief Complaint   Patient presents with    Follow-up     6 month        History of Present Illness     HPI  79-year-old female presents today for routine follow-up and discuss recent blood work results  She is doing well overall  She does remain active  Maintains well-balanced diet  Denies any concerns today  Up-to-date with colonoscopy done by Tanner Canales on 12/10/2020 with recommended repeat in 3 years  Up-to-date with mammogram on 02/11/2021  She is scheduled for follow-up Derm appointment in July  She also does cover acupuncture for her knee pain at the [address]  Review of Systems   Review of Systems   Constitutional: Negative for activity change and appetite change  Eyes: Negative for visual disturbance  Respiratory: Negative for shortness of breath  Cardiovascular: Negative  Gastrointestinal: Negative for abdominal pain and constipation  Genitourinary: Negative for dysuria  Neurological: Negative for dizziness  Psychiatric/Behavioral: Negative for dysphoric mood  Active Problem List     Patient Active Problem List   Diagnosis    Hyperlipidemia    Finger pain, right    Melanoma in situ ([facility] [address] )    Vitamin D deficiency    Routine health maintenance    Cervical radiculitis    Closed displaced fracture of proximal phalanx of right middle finger    Basal cell adenocarcinoma    Encounter to discuss test results    Blood pressure check       Past Medical History:  Past Medical History:   Diagnosis Date    Basal cell adenocarcinoma 2011    Melanoma [facility]) 2010    Nephrolithiasis     Skin cancer, basal cell     right arm       Past Surgical History:  History reviewed  No pertinent surgical history      Family History:  Family History   Problem Relation Age of Onset    Hyperlipidemia Mother     Colon cancer Family     Hypertension Family     Cancer Family         laryngeal    Colon cancer Father 80    Ovarian cancer Cousin 45    Pancreatic cancer Cousin 39    Prostate cancer Paternal Uncle 76       Social History:  Social History     Socioeconomic History    Marital status: /Civil Union     Spouse name: Not on file    Number of children: Not on file    Years of education: Not on file    Highest education level: Not on file   Occupational History    Not on file   Social Needs    Financial resource strain: Not on file    Food insecurity     Worry: Not on file     Inability: Not on file    Transportation needs     Medical: Not on file     Non-medical: Not on file   Tobacco Use    Smoking status: Never Smoker    Smokeless tobacco: Never Used   Substance and Sexual Activity    Alcohol use: Yes     Comment: social    Drug use: No    Sexual activity: Not on file   Lifestyle    Physical activity     Days per week: Not on file     Minutes per session: Not on file    Stress: Not on file   Relationships    Social connections     Talks on phone: Not on file     Gets together: Not on file     Attends Scientologist service: Not on file     Active member of club or organization: Not on file     Attends meetings of clubs or organizations: Not on file     Relationship status: Not on file    Intimate partner violence     Fear of current or ex partner: Not on file     Emotionally abused: Not on file     Physically abused: Not on file     Forced sexual activity: Not on file   Other Topics Concern    Not on file   Social History Narrative    Not on file     I have reviewed the patient's medical history in detail; there are no changes to the history as noted in the electronic medical record  Objective     Vitals:    04/26/21 0802   BP: 120/80   Pulse: 66   Resp: 18   Temp: 98 °F (36 7 °C)   SpO2: 97%     Wt Readings from Last 3 Encounters:   04/26/21 87 7 kg (193 lb 6 oz)   10/26/20 88 1 kg (194 lb 2 oz)   04/17/20 87 6 kg (193 lb 2 oz)     PHQ-9 Depression Screening    PHQ-9:   Frequency of the following problems over the past two weeks:      Little interest or pleasure in doing things: 0 - not at all  Feeling down, depressed, or hopeless: 0 - not at all  PHQ-2 Score: 0           Physical Exam  Vitals signs and nursing note reviewed  Constitutional:       General: She is not in acute distress  Appearance: Normal appearance  She is well-developed  HENT:      Head: Normocephalic and atraumatic  Right Ear: Tympanic membrane normal       Left Ear: Tympanic membrane normal       Mouth/Throat:      Mouth: Mucous membranes are moist       Pharynx: Oropharynx is clear  Eyes:      Conjunctiva/sclera: Conjunctivae normal       Pupils: Pupils are equal, round, and reactive to light  Neck:      Musculoskeletal: Normal range of motion and neck supple  Thyroid: No thyromegaly  Cardiovascular:      Rate and Rhythm: Normal rate and regular rhythm  Pulmonary:      Effort: Pulmonary effort is normal       Breath sounds: Normal breath sounds  Abdominal:      General: Bowel sounds are normal       Palpations: Abdomen is soft  Tenderness: There is no abdominal tenderness  Musculoskeletal: Normal range of motion  Lymphadenopathy:      Cervical: No cervical adenopathy  Neurological:      Mental Status: She is alert and oriented to person, place, and time     Psychiatric:         Mood and Affect: Mood normal          Pertinent Laboratory/Diagnostic Studies:  Lab Results   Component Value Date    GLUCOSE 82 01/04/2016    BUN 14 04/21/2021    CREATININE 0 88 04/21/2021    CALCIUM 9 0 04/21/2021     01/04/2016    K 4 2 04/21/2021    CO2 27 04/21/2021     (H) 04/21/2021     Lab Results   Component Value Date    ALT 33 04/21/2021    AST 15 04/21/2021    ALKPHOS 79 04/21/2021    BILITOT 0 37 06/25/2015       Lab Results   Component Value Date    WBC 7 72 04/21/2021    HGB 14 1 04/21/2021    HCT 44 0 04/21/2021    MCV 95 04/21/2021     04/21/2021       No results found for: TSH    Lab Results   Component Value Date    CHOL 286 01/04/2016     Lab Results   Component Value Date    TRIG 131 04/21/2021     Lab Results   Component Value Date    HDL 52 04/21/2021     Lab Results   Component Value Date    LDLCALC 144 (H) 04/21/2021     No results found for: HGBA1C    Results for orders placed or performed in visit on 04/21/21   CBC and differential   Result Value Ref Range    WBC 7 72 4 31 - 10 16 Thousand/uL    RBC 4 65 3 81 - 5 12 Million/uL    Hemoglobin 14 1 11 5 - 15 4 g/dL    Hematocrit 44 0 34 8 - 46 1 %    MCV 95 82 - 98 fL    MCH 30 3 26 8 - 34 3 pg    MCHC 32 0 31 4 - 37 4 g/dL    RDW 12 1 11 6 - 15 1 %    MPV 10 3 8 9 - 12 7 fL    Platelets 290 345 - 814 Thousands/uL    nRBC 0 /100 WBCs    Neutrophils Relative 68 43 - 75 %    Immat GRANS % 0 0 - 2 %    Lymphocytes Relative 18 14 - 44 %    Monocytes Relative 10 4 - 12 %    Eosinophils Relative 3 0 - 6 %    Basophils Relative 1 0 - 1 %    Neutrophils Absolute 5 28 1 85 - 7 62 Thousands/µL    Immature Grans Absolute 0 02 0 00 - 0 20 Thousand/uL    Lymphocytes Absolute 1 36 0 60 - 4 47 Thousands/µL    Monocytes Absolute 0 74 0 17 - 1 22 Thousand/µL    Eosinophils Absolute 0 25 0 00 - 0 61 Thousand/µL    Basophils Absolute 0 07 0 00 - 0 10 Thousands/µL   Comprehensive metabolic panel   Result Value Ref Range    Sodium 140 136 - 145 mmol/L    Potassium 4 2 3 5 - 5 3 mmol/L    Chloride 110 (H) 100 - 108 mmol/L    CO2 27 21 - 32 mmol/L    ANION GAP 3 (L) 4 - 13 mmol/L    BUN 14 5 - 25 mg/dL    Creatinine 0 88 0 60 - 1 30 mg/dL    Glucose, Fasting 86 65 - 99 mg/dL    Calcium 9 0 8 3 - 10 1 mg/dL    AST 15 5 - 45 U/L    ALT 33 12 - 78 U/L    Alkaline Phosphatase 79 46 - 116 U/L    Total Protein 7 7 6 4 - 8 2 g/dL    Albumin 3 7 3 5 - 5 0 g/dL    Total Bilirubin 0 62 0 20 - 1 00 mg/dL    eGFR 68 ml/min/1 73sq m   Lipid Panel with Direct LDL reflex   Result Value Ref Range    Cholesterol 222 (H) 50 - 200 mg/dL    Triglycerides 131 <=150 mg/dL    HDL, Direct 52 >=40 mg/dL    LDL Calculated 144 (H) 0 - 100 mg/dL   TSH, 3rd generation with Free T4 reflex   Result Value Ref Range    TSH 3RD GENERATON 3 380 0 358 - 3 740 uIU/mL   Vitamin D 25 hydroxy   Result Value Ref Range    Vit D, 25-Hydroxy 33 9 30 0 - 100 0 ng/mL       Orders Placed This Encounter   Procedures    DXA bone density spine hip and pelvis    Comprehensive metabolic panel       ALLERGIES:  Allergies   Allergen Reactions    Ezetimibe-Simvastatin Headache    Pravastatin Myalgia    Rosuvastatin Myalgia    Simvastatin Other (See Comments)     Muscle Aches       Current Medications     Current Outpatient Medications   Medication Sig Dispense Refill    Acetylcysteine ( PO) Take 600 mg by mouth daily      atorvastatin (LIPITOR) 10 mg tablet Take half a tablet daily 120 tablet 3    Calcium-Magnesium-Vitamin D (CALCIUM MAGNESIUM PO) Take 1 tablet by mouth daily      cholecalciferol (VITAMIN D3) 71759 units capsule Take by mouth      Cyanocobalamin (B-12 PO) Take 1 tablet by mouth daily      ezetimibe (ZETIA) 10 mg tablet TAKE ONE TABLET BY MOUTH EVERY DAY 90 tablet 3    Multiple Vitamin (MULTIVITAMINS PO) Take 1 tablet by mouth daily      UBIQUINOL PO Take 100 mg by mouth daily       amoxicillin (AMOXIL) 875 mg tablet Take 875 mg by mouth 2 (two) times a day      FOLIC ACID PO Take 1 tablet by mouth daily      sodium chloride 1 g tablet Take 1 g by mouth 3 (three) times a day      valACYclovir (VALTREX) 1,000 mg tablet Take 1 tablet (1,000 mg total) by mouth 3 (three) times a day for 7 days 21 tablet 0     No current facility-administered medications for this visit            Health Maintenance     Health Maintenance   Topic Date Due    COVID-19 Vaccine (1) Never done    BMI: Followup Plan  11/01/2021    Medicare Annual Wellness Visit (AWV)  10/26/2021    MAMMOGRAM  02/06/2022    Fall Risk  04/26/2022    Depression Screening PHQ  04/26/2022    BMI: Adult  04/26/2022    DTaP,Tdap,and Td Vaccines (2 - Td) 06/30/2024    Colonoscopy Surveillance  12/10/2025    Colorectal Cancer Screening  12/10/2030    Hepatitis C Screening  Completed    Pneumococcal Vaccine: 65+ Years  Completed    Influenza Vaccine Completed    HIB Vaccine  Aged Out    Hepatitis B Vaccine  Aged Out    IPV Vaccine  Aged Out    Hepatitis A Vaccine  Aged Out    Meningococcal ACWY Vaccine  Aged Out    HPV Vaccine  Aged Out     Immunization History   Administered Date(s) Administered    INFLUENZA 09/16/2020    Influenza, high dose seasonal 0 7 mL 10/02/2018, 11/04/2019    Influenza, seasonal, injectable 09/13/2010, 01/13/2017    Pneumococcal Conjugate 13-Valent 09/12/2017    Pneumococcal Polysaccharide PPV23 10/02/2018    Td (adult), adsorbed 05/04/2004    Tdap 06/30/2014       Flor Stein MD

## 2021-05-01 VITALS
HEIGHT: 64 IN | BODY MASS INDEX: 33.02 KG/M2 | RESPIRATION RATE: 18 BRPM | OXYGEN SATURATION: 97 % | DIASTOLIC BLOOD PRESSURE: 76 MMHG | WEIGHT: 193.38 LBS | SYSTOLIC BLOOD PRESSURE: 116 MMHG | TEMPERATURE: 98 F | HEART RATE: 66 BPM

## 2021-11-10 ENCOUNTER — APPOINTMENT (OUTPATIENT)
Dept: LAB | Facility: CLINIC | Age: 71
End: 2021-11-10
Payer: MEDICARE

## 2021-11-10 DIAGNOSIS — E78.5 HYPERLIPIDEMIA, UNSPECIFIED HYPERLIPIDEMIA TYPE: ICD-10-CM

## 2021-11-10 LAB
ALBUMIN SERPL BCP-MCNC: 3.5 G/DL (ref 3.5–5)
ALP SERPL-CCNC: 78 U/L (ref 46–116)
ALT SERPL W P-5'-P-CCNC: 37 U/L (ref 12–78)
ANION GAP SERPL CALCULATED.3IONS-SCNC: 3 MMOL/L (ref 4–13)
AST SERPL W P-5'-P-CCNC: 19 U/L (ref 5–45)
BILIRUB SERPL-MCNC: 0.69 MG/DL (ref 0.2–1)
BUN SERPL-MCNC: 17 MG/DL (ref 5–25)
CALCIUM SERPL-MCNC: 9.7 MG/DL (ref 8.3–10.1)
CHLORIDE SERPL-SCNC: 108 MMOL/L (ref 100–108)
CO2 SERPL-SCNC: 29 MMOL/L (ref 21–32)
CREAT SERPL-MCNC: 0.84 MG/DL (ref 0.6–1.3)
GFR SERPL CREATININE-BSD FRML MDRD: 71 ML/MIN/1.73SQ M
GLUCOSE P FAST SERPL-MCNC: 90 MG/DL (ref 65–99)
POTASSIUM SERPL-SCNC: 4.5 MMOL/L (ref 3.5–5.3)
PROT SERPL-MCNC: 7.3 G/DL (ref 6.4–8.2)
SODIUM SERPL-SCNC: 140 MMOL/L (ref 136–145)

## 2021-11-10 PROCEDURE — 80053 COMPREHEN METABOLIC PANEL: CPT

## 2021-11-10 PROCEDURE — 36415 COLL VENOUS BLD VENIPUNCTURE: CPT

## 2021-11-15 ENCOUNTER — TELEPHONE (OUTPATIENT)
Dept: FAMILY MEDICINE CLINIC | Facility: CLINIC | Age: 71
End: 2021-11-15

## 2021-11-16 ENCOUNTER — OFFICE VISIT (OUTPATIENT)
Dept: FAMILY MEDICINE CLINIC | Facility: CLINIC | Age: 71
End: 2021-11-16
Payer: MEDICARE

## 2021-11-16 VITALS
RESPIRATION RATE: 16 BRPM | DIASTOLIC BLOOD PRESSURE: 60 MMHG | WEIGHT: 189.4 LBS | OXYGEN SATURATION: 98 % | TEMPERATURE: 98.2 F | SYSTOLIC BLOOD PRESSURE: 118 MMHG | HEART RATE: 74 BPM | BODY MASS INDEX: 32.33 KG/M2 | HEIGHT: 64 IN

## 2021-11-16 DIAGNOSIS — E78.2 MIXED HYPERLIPIDEMIA: ICD-10-CM

## 2021-11-16 DIAGNOSIS — E55.9 VITAMIN D DEFICIENCY: ICD-10-CM

## 2021-11-16 DIAGNOSIS — Z00.00 MEDICARE ANNUAL WELLNESS VISIT, SUBSEQUENT: Primary | ICD-10-CM

## 2021-11-16 DIAGNOSIS — B07.8 OTHER VIRAL WARTS: ICD-10-CM

## 2021-11-16 PROBLEM — S62.612A CLOSED DISPLACED FRACTURE OF PROXIMAL PHALANX OF RIGHT MIDDLE FINGER: Status: RESOLVED | Noted: 2018-03-30 | Resolved: 2021-11-16

## 2021-11-16 PROBLEM — M79.644 FINGER PAIN, RIGHT: Status: RESOLVED | Noted: 2018-03-21 | Resolved: 2021-11-16

## 2021-11-16 PROBLEM — M54.12 CERVICAL RADICULITIS: Status: RESOLVED | Noted: 2018-03-30 | Resolved: 2021-11-16

## 2021-11-16 PROBLEM — Z71.2 ENCOUNTER TO DISCUSS TEST RESULTS: Status: RESOLVED | Noted: 2019-07-21 | Resolved: 2021-11-16

## 2021-11-16 PROBLEM — Z01.30 BLOOD PRESSURE CHECK: Status: RESOLVED | Noted: 2020-04-17 | Resolved: 2021-11-16

## 2021-11-16 PROBLEM — D03.9 MELANOMA IN SITU (HCC): Status: RESOLVED | Noted: 2018-03-21 | Resolved: 2021-11-16

## 2021-11-16 PROCEDURE — 99214 OFFICE O/P EST MOD 30 MIN: CPT | Performed by: FAMILY MEDICINE

## 2021-11-16 PROCEDURE — G0439 PPPS, SUBSEQ VISIT: HCPCS | Performed by: FAMILY MEDICINE

## 2021-11-16 PROCEDURE — 17110 DESTRUCTION B9 LES UP TO 14: CPT | Performed by: FAMILY MEDICINE

## 2021-11-18 DIAGNOSIS — E78.5 HYPERLIPIDEMIA, UNSPECIFIED HYPERLIPIDEMIA TYPE: ICD-10-CM

## 2021-11-20 RX ORDER — ATORVASTATIN CALCIUM 10 MG/1
TABLET, FILM COATED ORAL
Qty: 120 TABLET | Refills: 3 | Status: SHIPPED | OUTPATIENT
Start: 2021-11-20

## 2022-02-16 ENCOUNTER — HOSPITAL ENCOUNTER (OUTPATIENT)
Dept: RADIOLOGY | Age: 72
Discharge: HOME | End: 2022-02-16
Payer: MEDICARE

## 2022-02-16 DIAGNOSIS — Z78.0 MENOPAUSE: ICD-10-CM

## 2022-02-16 DIAGNOSIS — E55.9 VITAMIN D DEFICIENCY: ICD-10-CM

## 2022-02-16 PROCEDURE — 77080 DXA BONE DENSITY AXIAL: CPT

## 2022-03-09 DIAGNOSIS — E78.5 HYPERLIPIDEMIA, UNSPECIFIED HYPERLIPIDEMIA TYPE: ICD-10-CM

## 2022-03-09 RX ORDER — EZETIMIBE 10 MG/1
TABLET ORAL
Qty: 90 TABLET | Refills: 3 | Status: SHIPPED | OUTPATIENT
Start: 2022-03-09

## 2022-08-26 ENCOUNTER — OFFICE VISIT (OUTPATIENT)
Dept: FAMILY MEDICINE CLINIC | Facility: CLINIC | Age: 72
End: 2022-08-26
Payer: MEDICARE

## 2022-08-26 VITALS
DIASTOLIC BLOOD PRESSURE: 80 MMHG | WEIGHT: 194 LBS | HEART RATE: 71 BPM | HEIGHT: 64 IN | BODY MASS INDEX: 33.12 KG/M2 | SYSTOLIC BLOOD PRESSURE: 130 MMHG | RESPIRATION RATE: 20 BRPM | OXYGEN SATURATION: 98 % | TEMPERATURE: 98.2 F

## 2022-08-26 DIAGNOSIS — S76.312A STRAIN OF LEFT HAMSTRING, INITIAL ENCOUNTER: Primary | ICD-10-CM

## 2022-08-26 PROCEDURE — 99213 OFFICE O/P EST LOW 20 MIN: CPT | Performed by: FAMILY MEDICINE

## 2022-08-26 NOTE — ASSESSMENT & PLAN NOTE
Pulling sensation with standing from seating position  Recommend rest, ice, elevated, NSAIDs prn  If not improved with rest would send to PT  Follow up as needed

## 2022-08-26 NOTE — PROGRESS NOTES
Assessment/Plan:    Strain of left hamstring  Pulling sensation with standing from seating position  Recommend rest, ice, elevated, NSAIDs prn  If not improved with rest would send to PT  Follow up as needed       Diagnoses and all orders for this visit:    Strain of left hamstring, initial encounter          Subjective:      Patient ID: Camille Poe is a 81 y o  female  HPI   Patient presents for evaluation of pain in the back of her left knee  She feels a pulling sensation when standing from a seated position  Not bothersome when moving around  She was previously treated for Lyme disease when a rash presented in the same area she is now feeling pain, however, she reports pain is a pulling sensation and no associated skin changes or other joint pains  She has not been taking any medications to alleviate the pain  The following portions of the patient's history were reviewed and updated as appropriate: allergies, current medications, past family history, past medical history, past social history, past surgical history and problem list     Review of Systems   Constitutional: Negative for chills, fatigue and fever  Musculoskeletal: Positive for joint swelling  Negative for back pain, gait problem and neck pain  Skin: Negative for color change and rash  Psychiatric/Behavioral: Negative for sleep disturbance  All other systems reviewed and are negative  Objective:      /80 (BP Location: Left arm, Patient Position: Sitting, Cuff Size: Large)   Pulse 71   Temp 98 2 °F (36 8 °C) (Temporal)   Resp 20   Ht 5' 4" (1 626 m)   Wt 88 kg (194 lb)   SpO2 98%   BMI 33 30 kg/m²          Physical Exam  Constitutional:       Appearance: Normal appearance  Cardiovascular:      Rate and Rhythm: Normal rate and regular rhythm  Pulmonary:      Effort: Pulmonary effort is normal    Musculoskeletal:         General: Normal range of motion        Comments: Minimal swelling of the posterior medial left knee without overlying skin changes or tenderness   Skin:     General: Skin is warm and dry  Capillary Refill: Capillary refill takes less than 2 seconds  Findings: No rash  Neurological:      Mental Status: She is alert     Psychiatric:         Mood and Affect: Mood normal          Behavior: Behavior normal

## 2022-11-10 ENCOUNTER — RA CDI HCC (OUTPATIENT)
Dept: OTHER | Facility: HOSPITAL | Age: 72
End: 2022-11-10

## 2022-11-10 NOTE — PROGRESS NOTES
[facility] [address]  coding opportunities       Chart reviewed, no opportunity found: CHART REVIEWED, NO OPPORTUNITY FOUND        Patients Insurance     Medicare Insurance: Medicare

## 2022-11-17 ENCOUNTER — OFFICE VISIT (OUTPATIENT)
Dept: FAMILY MEDICINE CLINIC | Facility: CLINIC | Age: 72
End: 2022-11-17

## 2022-11-17 ENCOUNTER — LAB (OUTPATIENT)
Dept: LAB | Facility: CLINIC | Age: 72
End: 2022-11-17

## 2022-11-17 VITALS
HEIGHT: 64 IN | RESPIRATION RATE: 16 BRPM | TEMPERATURE: 97.8 F | HEART RATE: 69 BPM | WEIGHT: 194 LBS | SYSTOLIC BLOOD PRESSURE: 120 MMHG | DIASTOLIC BLOOD PRESSURE: 80 MMHG | OXYGEN SATURATION: 99 % | BODY MASS INDEX: 33.12 KG/M2

## 2022-11-17 DIAGNOSIS — E55.9 VITAMIN D DEFICIENCY: ICD-10-CM

## 2022-11-17 DIAGNOSIS — E78.2 MIXED HYPERLIPIDEMIA: ICD-10-CM

## 2022-11-17 DIAGNOSIS — Z80.0 FAMILY HISTORY OF COLON CANCER IN FATHER: ICD-10-CM

## 2022-11-17 DIAGNOSIS — Z23 INFLUENZA VACCINE NEEDED: ICD-10-CM

## 2022-11-17 DIAGNOSIS — Z12.31 ENCOUNTER FOR SCREENING MAMMOGRAM FOR MALIGNANT NEOPLASM OF BREAST: ICD-10-CM

## 2022-11-17 DIAGNOSIS — Z00.00 MEDICARE ANNUAL WELLNESS VISIT, SUBSEQUENT: Primary | ICD-10-CM

## 2022-11-17 PROBLEM — S76.312A STRAIN OF LEFT HAMSTRING: Status: RESOLVED | Noted: 2022-08-26 | Resolved: 2022-11-17

## 2022-11-17 LAB
CHOLEST SERPL-MCNC: 238 MG/DL
HDLC SERPL-MCNC: 56 MG/DL
LDLC SERPL CALC-MCNC: 153 MG/DL (ref 0–100)
NONHDLC SERPL-MCNC: 182 MG/DL
TRIGL SERPL-MCNC: 145 MG/DL

## 2022-11-17 RX ORDER — EZETIMIBE 10 MG/1
10 TABLET ORAL DAILY
Qty: 90 TABLET | Refills: 3 | Status: SHIPPED | OUTPATIENT
Start: 2022-11-17

## 2022-11-17 RX ORDER — ATORVASTATIN CALCIUM 10 MG/1
TABLET, FILM COATED ORAL
Qty: 120 TABLET | Refills: 3 | Status: SHIPPED | OUTPATIENT
Start: 2022-11-17

## 2022-11-17 NOTE — PROGRESS NOTES
Assessment and Plan:     Problem List Items Addressed This Visit    None  Visit Diagnoses     Influenza vaccine needed    -  Primary    Medicare annual wellness visit, subsequent               Preventive health issues were discussed with patient, and age appropriate screening tests were ordered as noted in patient's After Visit Summary  Personalized health advice and appropriate referrals for health education or preventive services given if needed, as noted in patient's After Visit Summary  History of Present Illness:     Patient presents for a Medicare Wellness Visit    HPI   Patient Care Team:  Mateo Chapman MD as PCP - General (Family Medicine)  Bijal Aguilera DO (Orthopedic Surgery)     Review of Systems:     Review of Systems     Problem List:     Patient Active Problem List   Diagnosis   • Hyperlipidemia   • Vitamin D deficiency   • Strain of left hamstring      Past Medical and Surgical History:     Past Medical History:   Diagnosis Date   • Basal cell adenocarcinoma 2011   • Melanoma ([facility] [address] ) 2010   • Melanoma in situ ([facility] [address] ) 3/21/2018   • Nephrolithiasis    • Skin cancer, basal cell     right arm     History reviewed  No pertinent surgical history     Family History:     Family History   Problem Relation Age of Onset   • Hyperlipidemia Mother    • Colon cancer Family    • Hypertension Family    • Cancer Family         laryngeal   • Colon cancer Father 80   • Ovarian cancer Cousin 45   • Pancreatic cancer Cousin 39   • Prostate cancer Paternal Uncle 76      Social History:     Social History     Socioeconomic History   • Marital status: /Civil Union     Spouse name: None   • Number of children: None   • Years of education: None   • Highest education level: None   Occupational History   • None   Tobacco Use   • Smoking status: Never   • Smokeless tobacco: Never   Vaping Use   • Vaping Use: Never used   Substance and Sexual Activity   • Alcohol use: Yes     Comment: social   • Drug use: No   • Sexual activity: Yes     Partners: Male   Other Topics Concern   • None   Social History Narrative     since 1973  3 children  10 grandchildren  8 nearby  The other in [place]  Retired at age 66 when he 1st grandchild was born    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   worked at [employer]  Social Determinants of Health     Financial Resource Strain: Low Risk    • Difficulty of Paying Living Expenses: Not very hard   Food Insecurity: Not on file   Transportation Needs: No Transportation Needs   • Lack of Transportation (Medical): No   • Lack of Transportation (Non-Medical): No   Physical Activity: Not on file   Stress: Not on file   Social Connections: Not on file   Intimate Partner Violence: Not on file   Housing Stability: Not on file      Medications and Allergies:     Current Outpatient Medications   Medication Sig Dispense Refill   • atorvastatin (LIPITOR) 10 mg tablet TAKE ONE-HALF TABLET BY MOUTH DAILY 120 tablet 3   • Calcium-Magnesium-Vitamin D (CALCIUM MAGNESIUM PO) Take 1 tablet by mouth daily     • cholecalciferol (VITAMIN D3) 80067 units capsule Take by mouth     • ezetimibe (ZETIA) 10 mg tablet TAKE ONE TABLET BY MOUTH EVERY DAY 90 tablet 3   • Multiple Vitamin (MULTIVITAMINS PO) Take 1 tablet by mouth daily       No current facility-administered medications for this visit       Allergies   Allergen Reactions   • Ezetimibe-Simvastatin Headache   • Pravastatin Myalgia   • Rosuvastatin Myalgia   • Simvastatin Other (See Comments)     Muscle Aches      Immunizations:     Immunization History   Administered Date(s) Administered   • COVID-19 PFIZER VACCINE 0 3 ML IM 03/19/2021, 04/08/2021, 11/12/2021   • INFLUENZA 09/16/2020, 10/15/2021   • Influenza, high dose seasonal 0 7 mL 10/02/2018, 11/04/2019   • Influenza, seasonal, injectable 09/13/2010, 01/13/2017   • Pneumococcal Conjugate 13-Valent 09/12/2017   • Pneumococcal Polysaccharide PPV23 10/02/2018   • Td (adult), adsorbed 05/04/2004 • Tdap 06/30/2014      Health Maintenance:         Topic Date Due   • Breast Cancer Screening: Mammogram  02/06/2022   • Colorectal Cancer Screening  12/10/2025   • Hepatitis C Screening  Completed         Topic Date Due   • Hepatitis B Vaccine (1 of 3 - 3-dose series) Never done   • COVID-19 Vaccine (4 - Booster for Pfizer series) 03/12/2022   • Influenza Vaccine (1) 09/01/2022      Medicare Screening Tests and Risk Assessments:         Health Risk Assessment:   Patient rates overall health as good  Patient feels that their physical health rating is same  Patient is very satisfied with their life  Eyesight was rated as same  Hearing was rated as same  Patient feels that their emotional and mental health rating is same  Patients states they are never, rarely angry  Patient states they are sometimes unusually tired/fatigued  Pain experienced in the last 7 days has been some  Patient's pain rating has been 3/10  Patient states that she has experienced weight loss or gain in last 6 months  Fall Risk Screening: In the past year, patient has experienced: no history of falling in past year      Urinary Incontinence Screening:   Patient has not leaked urine accidently in the last six months  Home Safety:  Patient does not have trouble with stairs inside or outside of their home  Patient has working smoke alarms and has working carbon monoxide detector  Home safety hazards include: none  Nutrition:   Current diet is Regular, Low Cholesterol and Limited junk food  Medications:   Patient is currently taking over-the-counter supplements  OTC medications include: daily vitamin  Patient is able to manage medications  Activities of Daily Living (ADLs)/Instrumental Activities of Daily Living (IADLs):   Walk and transfer into and out of bed and chair?: Yes  Dress and groom yourself?: Yes    Bathe or shower yourself?: Yes    Feed yourself?  Yes  Do your laundry/housekeeping?: Yes  Manage your money, pay your bills and track your expenses?: Yes  Make your own meals?: Yes    Do your own shopping?: Yes    Previous Hospitalizations:   Any hospitalizations or ED visits within the last 12 months?: No      Advance Care Planning:   Living will: Yes    Durable POA for healthcare: Yes    Advanced directive: Yes      PREVENTIVE SCREENINGS      Cardiovascular Screening:    General: Screening Not Indicated and History Lipid Disorder      Colorectal Cancer Screening:     General: Screening Current      Cervical Cancer Screening:    General: Screening Not Indicated      Lung Cancer Screening:     General: Screening Not Indicated      Hepatitis C Screening:    General: Screening Current    Screening, Brief Intervention, and Referral to Treatment (SBIRT)    Screening  Typical number of drinks in a day: 0  Typical number of drinks in a week: 2  Interpretation: Low risk drinking behavior  AUDIT-C Screenin) How often did you have a drink containing alcohol in the past year? 2 to 3 times a week  2) How many drinks did you have on a typical day when you were drinking in the past year? 0  3) How often did you have 6 or more drinks on one occasion in the past year? never    AUDIT-C Score: 3  Interpretation: Score 3-12 (female): POSITIVE screen for alcohol misuse    AUDIT Screenin) How often during the last year have you found that you were not able to stop drinking once you had started? 0 - never  5) How often during the last year have you failed to do what was normally expected from you because of drinking? 0 - never  6) How often during the last year have you needed a first drink in the morning to get yourself going after a heavy drinking session?  0 - never  7) How often during the last year have you had a feeling of guilt or remorse after drinking? 0 - never  8) How often during the last year have you been unable to remember what happened the night before because you had been drinking? 0 - never  9) Have you or someone else been injured as a result of your drinking? 0 - no  10) Has a relative or friend or a doctor or another health worker been concerned about your drinking or suggested you cut down? 0 - no    AUDIT Score: 3  Interpretation: Low risk alcohol consumption    Single Item Drug Screening:  How often have you used an illegal drug (including marijuana) or a prescription medication for non-medical reasons in the past year? never    Single Item Drug Screen Score: 0  Interpretation: Negative screen for possible drug use disorder    No results found       Physical Exam:     Pulse 69   Temp 97 8 °F (36 6 °C) (Temporal)   Resp 16   Ht 5' 4" (1 626 m)   Wt 88 kg (194 lb)   SpO2 99%   BMI 33 30 kg/m²     Physical Exam     Mayda Mcdonnell MD

## 2022-11-17 NOTE — PATIENT INSTRUCTIONS
Medicare wellness exam is completed  Looks great  Perfect blood pressure  Recheck lipid profile and vitamin-D level  Prescription for mammogram to be done sometime after January  Colonoscopy up-to-date  Had flu shot and latest COVID booster  Recheck 1 year or as needed  Medicare Preventive Visit Patient Instructions  Thank you for completing your Welcome to Medicare Visit or Medicare Annual Wellness Visit today  Your next wellness visit will be due in one year (11/18/2023)  The screening/preventive services that you may require over the next 5-10 years are detailed below  Some tests may not apply to you based off risk factors and/or age  Screening tests ordered at today's visit but not completed yet may show as past due  Also, please note that scanned in results may not display below  Preventive Screenings:  Service Recommendations Previous Testing/Comments   Colorectal Cancer Screening  * Colonoscopy    * Fecal Occult Blood Test (FOBT)/Fecal Immunochemical Test (FIT)  * Fecal DNA/Cologuard Test  * Flexible Sigmoidoscopy Age: 39-70 years old   Colonoscopy: every 10 years (may be performed more frequently if at higher risk)  OR  FOBT/FIT: every 1 year  OR  Cologuard: every 3 years  OR  Sigmoidoscopy: every 5 years  Screening may be recommended earlier than age 39 if at higher risk for colorectal cancer  Also, an individualized decision between you and your healthcare provider will decide whether screening between the ages of 74-80 would be appropriate  Colonoscopy: 12/10/2020  FOBT/FIT: Not on file  Cologuard: Not on file  Sigmoidoscopy: Not on file    Screening Current     Breast Cancer Screening Age: 36 years old  Frequency: every 1-2 years  Not required if history of left and right mastectomy Mammogram: 02/06/2020        Cervical Cancer Screening Between the ages of 21-29, pap smear recommended once every 3 years  Between the ages of 33-67, can perform pap smear with HPV co-testing every 5 years  Recommendations may differ for women with a history of total hysterectomy, cervical cancer, or abnormal pap smears in past  Pap Smear: Not on file    Screening Not Indicated   Hepatitis C Screening Once for adults born between 1945 and 1965  More frequently in patients at high risk for Hepatitis C Hep C Antibody: 10/07/2019    Screening Current   Diabetes Screening 1-2 times per year if you're at risk for diabetes or have pre-diabetes Fasting glucose: 90 mg/dL (11/10/2021)  A1C: No results in last 5 years (No results in last 5 years)      Cholesterol Screening Once every 5 years if you don't have a lipid disorder  May order more often based on risk factors  Lipid panel: 04/21/2021    Screening Not Indicated  History Lipid Disorder     Other Preventive Screenings Covered by Medicare:  Abdominal Aortic Aneurysm (AAA) Screening: covered once if your at risk  You're considered to be at risk if you have a family history of AAA  Lung Cancer Screening: covers low dose CT scan once per year if you meet all of the following conditions: (1) Age 50-69; (2) No signs or symptoms of lung cancer; (3) Current smoker or have quit smoking within the last 15 years; (4) You have a tobacco smoking history of at least 20 pack years (packs per day multiplied by number of years you smoked); (5) You get a written order from a healthcare provider    Glaucoma Screening: covered annually if you're considered high risk: (1) You have diabetes OR (2) Family history of glaucoma OR (3)  aged 48 and older OR (3)  American aged 72 and older  Osteoporosis Screening: covered every 2 years if you meet one of the following conditions: (1) You're estrogen deficient and at risk for osteoporosis based off medical history and other findings; (2) Have a vertebral abnormality; (3) On glucocorticoid therapy for more than 3 months; (4) Have primary hyperparathyroidism; (5) On osteoporosis medications and need to assess response to drug therapy  Last bone density test (DXA Scan): 02/16/2022  HIV Screening: covered annually if you're between the age of 12-76  Also covered annually if you are younger than 13 and older than 72 with risk factors for HIV infection  For pregnant patients, it is covered up to 3 times per pregnancy  Immunizations:  Immunization Recommendations   Influenza Vaccine Annual influenza vaccination during flu season is recommended for all persons aged >= 6 months who do not have contraindications   Pneumococcal Vaccine   * Pneumococcal conjugate vaccine = PCV13 (Prevnar 13), PCV15 (Vaxneuvance), PCV20 (Prevnar 20)  * Pneumococcal polysaccharide vaccine = PPSV23 (Pneumovax) Adults 25-60 years old: 1-3 doses may be recommended based on certain risk factors  Adults 72 years old: 1-2 doses may be recommended based off what pneumonia vaccine you previously received   Hepatitis B Vaccine 3 dose series if at intermediate or high risk (ex: diabetes, end stage renal disease, liver disease)   Tetanus (Td) Vaccine - COST NOT COVERED BY MEDICARE PART B Following completion of primary series, a booster dose should be given every 10 years to maintain immunity against tetanus  Td may also be given as tetanus wound prophylaxis  Tdap Vaccine - COST NOT COVERED BY MEDICARE PART B Recommended at least once for all adults  For pregnant patients, recommended with each pregnancy  Shingles Vaccine (Shingrix) - COST NOT COVERED BY MEDICARE PART B  2 shot series recommended in those aged 48 and above     Health Maintenance Due:      Topic Date Due    Breast Cancer Screening: Mammogram  02/06/2022    Colorectal Cancer Screening  12/10/2025    Hepatitis C Screening  Completed     Immunizations Due:      Topic Date Due    Hepatitis B Vaccine (1 of 3 - 3-dose series) Never done    COVID-19 Vaccine (4 - Booster for Pfizer series) 03/12/2022    Influenza Vaccine (1) 09/01/2022     Advance Directives   What are advance directives?   Advance directives are legal documents that state your wishes and plans for medical care  These plans are made ahead of time in case you lose your ability to make decisions for yourself  Advance directives can apply to any medical decision, such as the treatments you want, and if you want to donate organs  What are the types of advance directives? There are many types of advance directives, and each state has rules about how to use them  You may choose a combination of any of the following:  Living will: This is a written record of the treatment you want  You can also choose which treatments you do not want, which to limit, and which to stop at a certain time  This includes surgery, medicine, IV fluid, and tube feedings  Durable power of  for healthcare [facility]): This is a written record that states who you want to make healthcare choices for you when you are unable to make them for yourself  This person, called a proxy, is usually a family member or a friend  You may choose more than 1 proxy  Do not resuscitate (DNR) order:  A DNR order is used in case your heart stops beating or you stop breathing  It is a request not to have certain forms of treatment, such as CPR  A DNR order may be included in other types of advance directives  Medical directive: This covers the care that you want if you are in a coma, near death, or unable to make decisions for yourself  You can list the treatments you want for each condition  Treatment may include pain medicine, surgery, blood transfusions, dialysis, IV or tube feedings, and a ventilator (breathing machine)  Values history: This document has questions about your views, beliefs, and how you feel and think about life  This information can help others choose the care that you would choose  Why are advance directives important? An advance directive helps you control your care  Although spoken wishes may be used, it is better to have your wishes written down   Spoken wishes can be misunderstood, or not followed  Treatments may be given even if you do not want them  An advance directive may make it easier for your family to make difficult choices about your care  Weight Management   Why it is important to manage your weight:  Being overweight increases your risk of health conditions such as heart disease, high blood pressure, type 2 diabetes, and certain types of cancer  It can also increase your risk for osteoarthritis, sleep apnea, and other respiratory problems  Aim for a slow, steady weight loss  Even a small amount of weight loss can lower your risk of health problems  How to lose weight safely:  A safe and healthy way to lose weight is to eat fewer calories and get regular exercise  You can lose up about 1 pound a week by decreasing the number of calories you eat by 500 calories each day  Healthy meal plan for weight management:  A healthy meal plan includes a variety of foods, contains fewer calories, and helps you stay healthy  A healthy meal plan includes the following:  Eat whole-grain foods more often  A healthy meal plan should contain fiber  Fiber is the part of grains, fruits, and vegetables that is not broken down by your body  Whole-grain foods are healthy and provide extra fiber in your diet  Some examples of whole-grain foods are whole-wheat breads and pastas, oatmeal, brown rice, and bulgur  Eat a variety of vegetables every day  Include dark, leafy greens such as spinach, kale, mauri greens, and mustard greens  Eat yellow and orange vegetables such as carrots, sweet potatoes, and winter squash  Eat a variety of fruits every day  Choose fresh or canned fruit (canned in its own juice or light syrup) instead of juice  Fruit juice has very little or no fiber  Eat low-fat dairy foods  Drink fat-free (skim) milk or 1% milk  Eat fat-free yogurt and low-fat cottage cheese  Try low-fat cheeses such as mozzarella and other reduced-fat cheeses    Choose meat and other protein foods that are low in fat  Choose beans or other legumes such as split peas or lentils  Choose fish, skinless poultry (chicken or turkey), or lean cuts of red meat (beef or pork)  Before you cook meat or poultry, cut off any visible fat  Use less fat and oil  Try baking foods instead of frying them  Add less fat, such as margarine, sour cream, regular salad dressing and mayonnaise to foods  Eat fewer high-fat foods  Some examples of high-fat foods include french fries, doughnuts, ice cream, and cakes  Eat fewer sweets  Limit foods and drinks that are high in sugar  This includes candy, cookies, regular soda, and sweetened drinks  Exercise:  Exercise at least 30 minutes per day on most days of the week  Some examples of exercise include walking, biking, dancing, and swimming  You can also fit in more physical activity by taking the stairs instead of the elevator or parking farther away from stores  Ask your healthcare provider about the best exercise plan for you  Alcohol Use and Your Health    Drinking too much can harm your health  Excessive alcohol use leads to about 88,000 death in the [place] each year, and shortens the life of those who diet by almost 30 years  Further, excessive drinking cost the economy $249 billion in 2010  Most excessive drinkers are not alcohol dependent  Excessive alcohol use has immediate effects that increase the risk of many harmful health conditions  These are most often the result of binge drinking  Over time, excessive alcohol use can lead to the development of chronic diseases and other series health problems  What is considered a "drink"? Excessive alcohol use includes:  Binge Drinking: For women, 4 or more drinks consumed on one occasion  For men, 5 or more drinks consumed on one occasion  Heavy Drinking: For women, 8 or more drinks per week   For men, 15 or more drinks per week  Any alcohol used by pregnant women  Any alcohol used by those under the age of 24 years    If you choose to drink, do so in moderation:  Do not drink at all if you are under the age of 24, or if you are or may be pregnant, or have health problems that could be made worse by drinking  For women, up to 1 drink per day  For men, up to 2 drinks a day    No one should begin drinking or drink more frequently based on potential health benefits    Short-Term Health Risks:  Injuries: motor vehicle crashes, falls, drownings, burns  Violence: homicide, suicide, sexual assault, intimate partner violence  Alcohol poisoning  Reproductive health: risky sexual behaviors, unintended prengnacy, sexually transmitted diseases, miscarriage, stillbirth, fetal alcohol syndrome    Long-Term Health Risks:  Chronic diseases: high blood pressure, heart disease, stroke, liver disease, digestive problems  Cancers: breast, mouth and throat, liver, colon  Learning and memory problems: dementia, poor school performance  Mental health: depression, anxiety, insomnia  Social problems: lost productivity, family problems, unemployment  Alcohol dependence    For support and more information:  Substance [facility] and [facility] , [address] [place]  Web Address: https://[employer]/    Alcoholics Anonymous        Web Address: http://[employer]/    https://www cdc gov/alcohol/fact-sheets/alcohol-use htm     © Copyright [employer] 2018 Information is for End User's use only and may not be sold, redistributed or otherwise used for commercial purposes   All illustrations and images included in CareNotes® are the copyrighted property of [employer]  or [address] [place], [place] [address]pe

## 2022-11-17 NOTE — PROGRESS NOTES
Chief Complaint   Patient presents with   • Medicare Wellness Visit   • Follow-up   • Mammogram Due        HPI   81-year-old female presents for Medicare wellness exam and follow-up of hyperlipidemia and vitamin-D deficiency  Multiple skin cancers  Is followed by dermatology every 6 months  Continues on her cholesterol medication  Had her COVID booster in the last month  Colon cancer screening up-to-date  Past Medical History:   Diagnosis Date   • Basal cell adenocarcinoma 2011   • Family history of colon cancer in father 11/17/2022   • Melanoma ([facility] [address] ) 2010   • Melanoma in situ ([facility] [address] ) 3/21/2018   • Nephrolithiasis    • Skin cancer, basal cell     right arm        History reviewed  No pertinent surgical history  Social History     Tobacco Use   • Smoking status: Never   • Smokeless tobacco: Never   Substance Use Topics   • Alcohol use: Yes     Comment: social       Social History     Social History Narrative     since 1973  3 children  10 grandchildren  8 nearby  The other in [place]  Retired at age 66 when he 1st grandchild was born    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   worked at [employer]  The following portions of the patient's history were reviewed and updated as appropriate: allergies, current medications, past family history, past medical history, past social history, past surgical history and problem list       Review of Systems       /80 (BP Location: Left arm, Patient Position: Sitting, Cuff Size: Standard)   Pulse 69   Temp 97 8 °F (36 6 °C) (Temporal)   Resp 16   Ht 5' 4" (1 626 m)   Wt 88 kg (194 lb)   SpO2 99%   BMI 33 30 kg/m²      Physical Exam   Appears well  No neck nodes or thyromegaly  Lungs are clear  Heart regular  Abdomen nontender  No edema  Mood is upbeat  Affect appropriate                Current Outpatient Medications:   •  atorvastatin (LIPITOR) 10 mg tablet, TAKE ONE-HALF TABLET BY MOUTH DAILY, Disp: 120 tablet, Rfl: 3  • Calcium-Magnesium-Vitamin D (CALCIUM MAGNESIUM PO), Take 1 tablet by mouth daily, Disp: , Rfl:   •  cholecalciferol (VITAMIN D3) 92049 units capsule, Take by mouth, Disp: , Rfl:   •  ezetimibe (ZETIA) 10 mg tablet, Take 1 tablet (10 mg total) by mouth daily, Disp: 90 tablet, Rfl: 3  •  Multiple Vitamin (MULTIVITAMINS PO), Take 1 tablet by mouth daily, Disp: , Rfl:      No problem-specific Assessment & Plan notes found for this encounter  Diagnoses and all orders for this visit:    Medicare annual wellness visit, subsequent    Mixed hyperlipidemia  -     Lipid panel; Future  -     atorvastatin (LIPITOR) 10 mg tablet; TAKE ONE-HALF TABLET BY MOUTH DAILY  -     ezetimibe (ZETIA) 10 mg tablet; Take 1 tablet (10 mg total) by mouth daily    Vitamin D deficiency  -     Vitamin D 1,25 dihydroxy; Future    Influenza vaccine needed    Family history of colon cancer in father    Encounter for screening mammogram for malignant neoplasm of breast  -     Mammo screening bilateral w 3d & cad; Future        Patient Instructions     Medicare wellness exam is completed  Looks great  Perfect blood pressure  Recheck lipid profile and vitamin-D level  Prescription for mammogram to be done sometime after January  Colonoscopy up-to-date  Had flu shot and latest COVID booster  Recheck 1 year or as needed  Medicare Preventive Visit Patient Instructions  Thank you for completing your Welcome to Medicare Visit or Medicare Annual Wellness Visit today  Your next wellness visit will be due in one year (11/18/2023)  The screening/preventive services that you may require over the next 5-10 years are detailed below  Some tests may not apply to you based off risk factors and/or age  Screening tests ordered at today's visit but not completed yet may show as past due  Also, please note that scanned in results may not display below    Preventive Screenings:  Service Recommendations Previous Testing/Comments   Colorectal Cancer Screening  * Colonoscopy    * Fecal Occult Blood Test (FOBT)/Fecal Immunochemical Test (FIT)  * Fecal DNA/Cologuard Test  * Flexible Sigmoidoscopy Age: 39-70 years old   Colonoscopy: every 10 years (may be performed more frequently if at higher risk)  OR  FOBT/FIT: every 1 year  OR  Cologuard: every 3 years  OR  Sigmoidoscopy: every 5 years  Screening may be recommended earlier than age 39 if at higher risk for colorectal cancer  Also, an individualized decision between you and your healthcare provider will decide whether screening between the ages of 74-80 would be appropriate  Colonoscopy: 12/10/2020  FOBT/FIT: Not on file  Cologuard: Not on file  Sigmoidoscopy: Not on file    Screening Current     Breast Cancer Screening Age: 36 years old  Frequency: every 1-2 years  Not required if history of left and right mastectomy Mammogram: 02/06/2020        Cervical Cancer Screening Between the ages of 21-29, pap smear recommended once every 3 years  Between the ages of 33-67, can perform pap smear with HPV co-testing every 5 years  Recommendations may differ for women with a history of total hysterectomy, cervical cancer, or abnormal pap smears in past  Pap Smear: Not on file    Screening Not Indicated   Hepatitis C Screening Once for adults born between 1945 and 1965  More frequently in patients at high risk for Hepatitis C Hep C Antibody: 10/07/2019    Screening Current   Diabetes Screening 1-2 times per year if you're at risk for diabetes or have pre-diabetes Fasting glucose: 90 mg/dL (11/10/2021)  A1C: No results in last 5 years (No results in last 5 years)      Cholesterol Screening Once every 5 years if you don't have a lipid disorder  May order more often based on risk factors  Lipid panel: 04/21/2021    Screening Not Indicated  History Lipid Disorder     Other Preventive Screenings Covered by Medicare:  1  Abdominal Aortic Aneurysm (AAA) Screening: covered once if your at risk   You're considered to be at risk if you have a family history of AAA  2  Lung Cancer Screening: covers low dose CT scan once per year if you meet all of the following conditions: (1) Age 50-69; (2) No signs or symptoms of lung cancer; (3) Current smoker or have quit smoking within the last 15 years; (4) You have a tobacco smoking history of at least 20 pack years (packs per day multiplied by number of years you smoked); (5) You get a written order from a healthcare provider  3  Glaucoma Screening: covered annually if you're considered high risk: (1) You have diabetes OR (2) Family history of glaucoma OR (3)  aged 48 and older OR (3)  American aged 72 and older  3  Osteoporosis Screening: covered every 2 years if you meet one of the following conditions: (1) You're estrogen deficient and at risk for osteoporosis based off medical history and other findings; (2) Have a vertebral abnormality; (3) On glucocorticoid therapy for more than 3 months; (4) Have primary hyperparathyroidism; (5) On osteoporosis medications and need to assess response to drug therapy  · Last bone density test (DXA Scan): 02/16/2022   5  HIV Screening: covered annually if you're between the age of 15-65  Also covered annually if you are younger than 13 and older than 72 with risk factors for HIV infection  For pregnant patients, it is covered up to 3 times per pregnancy      Immunizations:  Immunization Recommendations   Influenza Vaccine Annual influenza vaccination during flu season is recommended for all persons aged >= 6 months who do not have contraindications   Pneumococcal Vaccine   * Pneumococcal conjugate vaccine = PCV13 (Prevnar 13), PCV15 (Vaxneuvance), PCV20 (Prevnar 20)  * Pneumococcal polysaccharide vaccine = PPSV23 (Pneumovax) Adults 25-60 years old: 1-3 doses may be recommended based on certain risk factors  Adults 72 years old: 1-2 doses may be recommended based off what pneumonia vaccine you previously received   Hepatitis B Vaccine 3 dose series if at intermediate or high risk (ex: diabetes, end stage renal disease, liver disease)   Tetanus (Td) Vaccine - COST NOT COVERED BY MEDICARE PART B Following completion of primary series, a booster dose should be given every 10 years to maintain immunity against tetanus  Td may also be given as tetanus wound prophylaxis  Tdap Vaccine - COST NOT COVERED BY MEDICARE PART B Recommended at least once for all adults  For pregnant patients, recommended with each pregnancy  Shingles Vaccine (Shingrix) - COST NOT COVERED BY MEDICARE PART B  2 shot series recommended in those aged 48 and above     Health Maintenance Due:      Topic Date Due   • Breast Cancer Screening: Mammogram  02/06/2022   • Colorectal Cancer Screening  12/10/2025   • Hepatitis C Screening  Completed     Immunizations Due:      Topic Date Due   • Hepatitis B Vaccine (1 of 3 - 3-dose series) Never done   • COVID-19 Vaccine (4 - Booster for Pfizer series) 03/12/2022   • Influenza Vaccine (1) 09/01/2022     Advance Directives   What are advance directives? Advance directives are legal documents that state your wishes and plans for medical care  These plans are made ahead of time in case you lose your ability to make decisions for yourself  Advance directives can apply to any medical decision, such as the treatments you want, and if you want to donate organs  What are the types of advance directives? There are many types of advance directives, and each state has rules about how to use them  You may choose a combination of any of the following:  · Living will: This is a written record of the treatment you want  You can also choose which treatments you do not want, which to limit, and which to stop at a certain time  This includes surgery, medicine, IV fluid, and tube feedings  · Durable power of  for healthcare [facility]):   This is a written record that states who you want to make healthcare choices for you when you are unable to make them for yourself  This person, called a proxy, is usually a family member or a friend  You may choose more than 1 proxy  · Do not resuscitate (DNR) order:  A DNR order is used in case your heart stops beating or you stop breathing  It is a request not to have certain forms of treatment, such as CPR  A DNR order may be included in other types of advance directives  · Medical directive: This covers the care that you want if you are in a coma, near death, or unable to make decisions for yourself  You can list the treatments you want for each condition  Treatment may include pain medicine, surgery, blood transfusions, dialysis, IV or tube feedings, and a ventilator (breathing machine)  · Values history: This document has questions about your views, beliefs, and how you feel and think about life  This information can help others choose the care that you would choose  Why are advance directives important? An advance directive helps you control your care  Although spoken wishes may be used, it is better to have your wishes written down  Spoken wishes can be misunderstood, or not followed  Treatments may be given even if you do not want them  An advance directive may make it easier for your family to make difficult choices about your care  Weight Management   Why it is important to manage your weight:  Being overweight increases your risk of health conditions such as heart disease, high blood pressure, type 2 diabetes, and certain types of cancer  It can also increase your risk for osteoarthritis, sleep apnea, and other respiratory problems  Aim for a slow, steady weight loss  Even a small amount of weight loss can lower your risk of health problems  How to lose weight safely:  A safe and healthy way to lose weight is to eat fewer calories and get regular exercise  You can lose up about 1 pound a week by decreasing the number of calories you eat by 500 calories each day     Healthy meal plan for weight management:  A healthy meal plan includes a variety of foods, contains fewer calories, and helps you stay healthy  A healthy meal plan includes the following:  · Eat whole-grain foods more often  A healthy meal plan should contain fiber  Fiber is the part of grains, fruits, and vegetables that is not broken down by your body  Whole-grain foods are healthy and provide extra fiber in your diet  Some examples of whole-grain foods are whole-wheat breads and pastas, oatmeal, brown rice, and bulgur  · Eat a variety of vegetables every day  Include dark, leafy greens such as spinach, kale, mauri greens, and mustard greens  Eat yellow and orange vegetables such as carrots, sweet potatoes, and winter squash  · Eat a variety of fruits every day  Choose fresh or canned fruit (canned in its own juice or light syrup) instead of juice  Fruit juice has very little or no fiber  · Eat low-fat dairy foods  Drink fat-free (skim) milk or 1% milk  Eat fat-free yogurt and low-fat cottage cheese  Try low-fat cheeses such as mozzarella and other reduced-fat cheeses  · Choose meat and other protein foods that are low in fat  Choose beans or other legumes such as split peas or lentils  Choose fish, skinless poultry (chicken or turkey), or lean cuts of red meat (beef or pork)  Before you cook meat or poultry, cut off any visible fat  · Use less fat and oil  Try baking foods instead of frying them  Add less fat, such as margarine, sour cream, regular salad dressing and mayonnaise to foods  Eat fewer high-fat foods  Some examples of high-fat foods include french fries, doughnuts, ice cream, and cakes  · Eat fewer sweets  Limit foods and drinks that are high in sugar  This includes candy, cookies, regular soda, and sweetened drinks  Exercise:  Exercise at least 30 minutes per day on most days of the week  Some examples of exercise include walking, biking, dancing, and swimming   You can also fit in more physical activity by taking the stairs instead of the elevator or parking farther away from stores  Ask your healthcare provider about the best exercise plan for you  Alcohol Use and Your Health    Drinking too much can harm your health  Excessive alcohol use leads to about 88,000 death in the [place] each year, and shortens the life of those who diet by almost 30 years  Further, excessive drinking cost the economy $249 billion in 2010  Most excessive drinkers are not alcohol dependent  Excessive alcohol use has immediate effects that increase the risk of many harmful health conditions  These are most often the result of binge drinking  Over time, excessive alcohol use can lead to the development of chronic diseases and other series health problems  What is considered a "drink"? Excessive alcohol use includes:  · Binge Drinking: For women, 4 or more drinks consumed on one occasion  For men, 5 or more drinks consumed on one occasion  · Heavy Drinking: For women, 8 or more drinks per week  For men, 15 or more drinks per week  · Any alcohol used by pregnant women  · Any alcohol used by those under the age of 21 years    If you choose to drink, do so in moderation:  · Do not drink at all if you are under the age of 24, or if you are or may be pregnant, or have health problems that could be made worse by drinking    · For women, up to 1 drink per day  · For men, up to 2 drinks a day    No one should begin drinking or drink more frequently based on potential health benefits    Short-Term Health Risks:  · Injuries: motor vehicle crashes, falls, drownings, burns  · Violence: homicide, suicide, sexual assault, intimate partner violence  · Alcohol poisoning  · Reproductive health: risky sexual behaviors, unintended prengnacy, sexually transmitted diseases, miscarriage, stillbirth, fetal alcohol syndrome    Long-Term Health Risks:  · Chronic diseases: high blood pressure, heart disease, stroke, liver disease, digestive problems  · Cancers: breast, mouth and throat, liver, colon  · Learning and memory problems: dementia, poor school performance  · Mental health: depression, anxiety, insomnia  · Social problems: lost productivity, family problems, unemployment  · Alcohol dependence    For support and more information:  · Substance Abuse and [facility] [address] [place]  Web Address: https://iStreamPlanet/    · Alcoholics Anonymous        Web Address: http://www carr[place] info/    https://www cdc gov/alcohol/fact-sheets/alcohol-use htm     © [address] 2018 Information is for End User's use only and may not be sold, redistributed or otherwise used for commercial purposes   All illustrations and images included in CareNotes® are the copyrighted property of A D A M , Inc  or [facility] [employer]        Answers for HPI/ROS submitted by the patient on 11/10/2022  How often during the last year have you found that you were not able to stop drinking once you had started?: 0 - never  How often during the last year have you failed to do what was normally expected from you because of drinking?: 0 - never  How often during the last year have you needed a first drink in the morning to get yourself going after a heavy drinking session?: 0 - never  How often during the last year have you had a feeling of guilt or remorse after drinking?: 0 - never  How often during the last year have you been unable to remember what happened the night before because you had been drinking?: 0 - never  Have you or someone else been injured as a result of your drinking?: 0 - no  Has a relative or friend or a doctor or another health worker been concerned about your drinking or suggested you cut down?: 0 - no  How would you rate your overall health?: good  Compared to last year, how is your physical health?: same  In general, how satisfied are you with your life?: very satisfied  Compared to last year, how is your eyesight?: same  Compared to last year, how is your hearing?: same  Compared to last year, how is your emotional/mental health?: same  How often is anger a problem for you?: never, rarely  How often do you feel unusually tired/fatigued?: sometimes  In the past 7 days, how much pain have you experienced?: some  If you answered "some" or "a lot", please rate the severity of your pain on a scale of 1 to 10 (1 being the least severe pain and 10 being the most intense pain)  : 3/10  In the past 6 months, have you lost or gained 10 pounds without trying?: Yes  One or more falls in the last year: No  In the past 6 months, have you accidentally leaked urine?: No  Do you have trouble with the stairs inside or outside your home?: No  Does your home have working smoke alarms?: Yes  Does your home have a carbon monoxide monitor?: Yes  Which safety hazards (if any) have you experienced in your home? Please select all that apply : none  How would you describe your current diet?  Please select all that apply : Regular, Low Cholesterol, Limited junk food  In addition to prescription medications, are you taking any over-the-counter supplements?: Yes  If yes, what supplements are you taking?: daily vitamin  Can you manage your medications?: Yes  Are you currently taking any opioid medications?: No  Can you walk and transfer into and out of your bed and chair?: Yes  Can you dress and groom yourself?: Yes  Can you bathe or shower yourself?: Yes  Can you feed yourself?: Yes  Can you do your laundry/ housekeeping?: Yes  Can you manage your money, pay your bills, and track your expenses?: Yes  Can you make your own meals?: Yes  Can you do your own shopping?: Yes  Within the last 12 months, have you had any hospitalizations or Emergency Department visits?: No  Do you have a living will?: Yes  Do you have a Durable POA (Power of ) for healthcare decisions?: Yes  Do you have an Advanced Directive for end of life decisions?: Yes  How often have you used an illegal drug (including marijuana) or a prescription medication for non-medical reasons in the past year?: never  What is the typical number of drinks you consume in a day?: 0  What is the typical number of drinks you consume in a week?: 2  How often did you have a drink containing alcohol in the past year?: 2 to 3 times a week  How many drinks did you have on a typical day  when you were drinking in the past year?: 0  How often did you have 6 or more drinks on one occasion in the past year?: never

## 2022-11-18 ENCOUNTER — TELEPHONE (OUTPATIENT)
Dept: ADMINISTRATIVE | Facility: OTHER | Age: 72
End: 2022-11-18

## 2022-11-18 NOTE — TELEPHONE ENCOUNTER
----- Message from Ronn Ferrer MD sent at 11/17/2022  8:30 AM EST -----  Please abstract mammogram from [facility] done in February of 2022

## 2022-11-19 LAB — 1,25(OH)2D3 SERPL-MCNC: 59.6 PG/ML (ref 24.8–81.5)

## 2022-11-21 NOTE — TELEPHONE ENCOUNTER
Upon review of the In Basket request we were able to locate, review, and update the patient chart as requested for Mammogram     Any additional questions or concerns should be emailed to the Practice Liaisons via the appropriate education email address, please do not reply via In Basket      Thank you  Jasmin De La Cruz MA

## 2023-07-01 ENCOUNTER — APPOINTMENT (EMERGENCY)
Dept: RADIOLOGY | Facility: HOSPITAL | Age: 73
End: 2023-07-01
Payer: MEDICARE

## 2023-07-01 ENCOUNTER — HOSPITAL ENCOUNTER (EMERGENCY)
Facility: HOSPITAL | Age: 73
Discharge: HOME | End: 2023-07-01
Attending: EMERGENCY MEDICINE | Admitting: EMERGENCY MEDICINE
Payer: MEDICARE

## 2023-07-01 ENCOUNTER — APPOINTMENT (EMERGENCY)
Dept: NON INVASIVE DIAGNOSTICS | Facility: HOSPITAL | Age: 73
End: 2023-07-01
Payer: MEDICARE

## 2023-07-01 VITALS
HEART RATE: 89 BPM | OXYGEN SATURATION: 95 % | SYSTOLIC BLOOD PRESSURE: 170 MMHG | DIASTOLIC BLOOD PRESSURE: 75 MMHG | TEMPERATURE: 97.9 F | RESPIRATION RATE: 18 BRPM

## 2023-07-01 DIAGNOSIS — M25.561 POSTERIOR RIGHT KNEE PAIN: Primary | ICD-10-CM

## 2023-07-01 PROCEDURE — 93971 EXTREMITY STUDY: CPT

## 2023-07-01 PROCEDURE — 99283 EMERGENCY DEPT VISIT LOW MDM: CPT

## 2023-07-01 PROCEDURE — 73564 X-RAY EXAM KNEE 4 OR MORE: CPT

## 2023-07-01 RX ORDER — IBUPROFEN 400 MG/1
400 TABLET ORAL ONCE
Status: COMPLETED | OUTPATIENT
Start: 2023-07-01 | End: 2023-07-01

## 2023-07-01 RX ADMIN — IBUPROFEN 400 MG: 400 TABLET, FILM COATED ORAL at 11:18

## 2023-07-01 NOTE — ED ATTENDING ATTESTATION
7/1/2023  ITorie MD, saw and evaluated the patient. I have discussed the patient with the resident/non-physician practitioner and agree with the resident's/non-physician practitioner's findings, Plan of Care, and MDM as documented in the resident's/non-physician practitioner's note, except where noted. All available labs and Radiology studies were reviewed. I was present for key portions of any procedure(s) performed by the resident/non-physician practitioner and I was immediately available to provide assistance. At this point I agree with the current assessment done in the Emergency Department. I have conducted an independent evaluation of this patient a history and physical is as follows:    OA: 80 y/o f with right knee pain that began after walking up her steps this morning. Pt heard a "pop" in her knee and has had difficulty bending her knee to flexion since that time. Denies pain in her thigh/hip/ankle. No falls. Has a history of some arthritis but does not take daily medication for it. No other sxms. NO cp/sob. No falls. No headache/dizziness/lightheadedness. No numbness/weakness/tingling. PE, NAD, VSS, NC/AT, MMM, clear sclera/conjunctiva, neck supple/FROM, RR, lungs CTAB, abd soft, +BS,+ DP pulses, - edema, no ttp over hip, ankle or anterior patella. Able to invert/cindy and extend knee but pain with flexion. No deformity/bulge noted. + ttp over popliteal fossa and upper calf without swelling/erythema/warmth. Intact sensation. A/p knee pain. Xray, given calf ttp will get vascular study, pain control and re-eval    Will offer pt brace if desired pending workup.  Will require f/u with pcp/ortho    ED Course     Awaiting vascular technicican    Critical Care Time  Procedures

## 2023-07-01 NOTE — DISCHARGE INSTRUCTIONS
Thank you for coming to the ER today. Please follow up with your primary care doctor in 1-2 days to be re-evaluated. If at any point you experience any new or worsening symptoms do not hesitate to come back to the hospital to be evaluated. Thank you and hope you have a great rest of your day.

## 2023-07-01 NOTE — ED PROVIDER NOTES
History  Chief Complaint   Patient presents with   • Leg Pain     Pt reports right leg pain. Pt reports being unable to bend leg. Pt reports it started about this early morning. Pt reports stepping and hearing a noise. Patient is 65-year-old female presenting to the emergency department for evaluation of right knee pain. Patient states she was going up the stairs heard at pop and then had pain behind her knee. Patient states she is unable to bend the knee currently she denies any numbness or tingling in the extremity she denies any sensation different any recent fevers or chills chest pain or shortness of breath she denies any falls. Patient wants come to the hospital for further evaluation. Patient did take Tylenol prior to arrival.          Prior to Admission Medications   Prescriptions Last Dose Informant Patient Reported? Taking? Calcium-Magnesium-Vitamin D (CALCIUM MAGNESIUM PO)  Self Yes No   Sig: Take 1 tablet by mouth daily   Multiple Vitamin (MULTIVITAMINS PO)  Self Yes No   Sig: Take 1 tablet by mouth daily   atorvastatin (LIPITOR) 10 mg tablet   No No   Sig: TAKE ONE-HALF TABLET BY MOUTH DAILY   cholecalciferol (VITAMIN D3) 59885 units capsule  Self Yes No   Sig: Take by mouth   ezetimibe (ZETIA) 10 mg tablet   No No   Sig: Take 1 tablet (10 mg total) by mouth daily      Facility-Administered Medications: None       Past Medical History:   Diagnosis Date   • Basal cell adenocarcinoma 2011   • Family history of colon cancer in father 11/17/2022   • Melanoma ([address]) 2010   • Melanoma in situ ([address]) 3/21/2018   • Nephrolithiasis    • Skin cancer, basal cell     right arm       History reviewed. No pertinent surgical history.     Family History   Problem Relation Age of Onset   • Hyperlipidemia Mother    • Colon cancer Father 80   • Prostate cancer Paternal Uncle 76   • Ovarian cancer Cousin 45   • Pancreatic cancer Cousin 39   • Colon cancer Family    • Hypertension Family    • Cancer Family laryngeal     I have reviewed and agree with the history as documented. E-Cigarette/Vaping   • E-Cigarette Use Never User      E-Cigarette/Vaping Substances   • Nicotine No    • THC No    • CBD No    • Flavoring No    • Other No    • Unknown No      Social History     Tobacco Use   • Smoking status: Never   • Smokeless tobacco: Never   Vaping Use   • Vaping Use: Never used   Substance Use Topics   • Alcohol use: Yes     Comment: social   • Drug use: No        Review of Systems   Constitutional: Negative for chills and fever. HENT: Negative for ear pain and sore throat. Eyes: Negative for pain and visual disturbance. Respiratory: Negative for cough, chest tightness and shortness of breath. Cardiovascular: Negative for chest pain and palpitations. Gastrointestinal: Negative for abdominal pain and vomiting. Genitourinary: Negative for dysuria and hematuria. Musculoskeletal: Positive for gait problem. Negative for arthralgias and back pain. Right knee pain   Skin: Negative for color change and rash. Neurological: Negative for dizziness, seizures, syncope, weakness, light-headedness, numbness and headaches. Psychiatric/Behavioral: Negative for agitation and behavioral problems. All other systems reviewed and are negative. Physical Exam  ED Triage Vitals   Temperature Pulse Respirations Blood Pressure SpO2   07/01/23 1029 07/01/23 1029 07/01/23 1029 07/01/23 1032 07/01/23 1029   97.9 °F (36.6 °C) (!) 107 18 170/75 95 %      Temp Source Heart Rate Source Patient Position - Orthostatic VS BP Location FiO2 (%)   07/01/23 1029 07/01/23 1029 07/01/23 1032 07/01/23 1032 --   Oral Monitor Sitting Left arm       Pain Score       07/01/23 1029       6             Orthostatic Vital Signs  Vitals:    07/01/23 1029 07/01/23 1032 07/01/23 1124   BP:  170/75    Pulse: (!) 107  89   Patient Position - Orthostatic VS:  Sitting        Physical Exam  Vitals and nursing note reviewed.    Constitutional: General: She is not in acute distress. Appearance: Normal appearance. She is well-developed. HENT:      Head: Normocephalic and atraumatic. Right Ear: External ear normal.      Left Ear: External ear normal.      Nose: Nose normal.      Mouth/Throat:      Mouth: Mucous membranes are moist.   Eyes:      Conjunctiva/sclera: Conjunctivae normal.   Cardiovascular:      Rate and Rhythm: Normal rate and regular rhythm. Heart sounds: No murmur heard. Pulmonary:      Effort: Pulmonary effort is normal. No respiratory distress. Breath sounds: Normal breath sounds. Abdominal:      General: Abdomen is flat. Palpations: Abdomen is soft. Tenderness: There is no abdominal tenderness. Musculoskeletal:         General: No swelling. Cervical back: Neck supple. Right hip: Normal.      Left hip: Normal.      Right upper leg: Normal.      Left upper leg: Normal.      Right knee: No swelling or bony tenderness. Decreased range of motion. No LCL laxity, MCL laxity, ACL laxity or PCL laxity. Normal pulse. Left knee: Normal.      Right lower leg: Normal.      Left lower leg: Normal.      Right ankle: Normal.      Left ankle: Normal.      Right foot: Normal.      Left foot: Normal.      Comments: Pain behind knee on palpation; no erythema or ecchymosis noted   Skin:     General: Skin is warm and dry. Capillary Refill: Capillary refill takes less than 2 seconds. Neurological:      General: No focal deficit present. Mental Status: She is alert and oriented to person, place, and time.    Psychiatric:         Mood and Affect: Mood normal.         Behavior: Behavior normal.         ED Medications  Medications   ibuprofen (MOTRIN) tablet 400 mg (400 mg Oral Given 7/1/23 1118)       Diagnostic Studies  Results Reviewed     None                 XR knee 4+ vw right injury   ED Interpretation by Bony Hogue DO (07/01 1146)   No acute fractures or dislocations noted VAS lower limb venous duplex study, unilateral/limited    (Results Pending)         Procedures  POC MSK/Soft Tissue US    Date/Time: 7/1/2023 11:27 AM    Performed by: Warrick Sandhoff, DO  Authorized by: Warrick Sandhoff, DO    Patient location:  ED  Procedure:     Performed: soft tissue ultrasound and musculoskeletal ultrasound    Procedure details:     Exam Type:  Educational    Longitudinal view:  Obtained    Transverse view:  Obtained    Image quality: limited diagnostic      Image availability:  Images available in PACS  Soft tissue ultrasound:     Soft tissue indications: pain      Location: right knee. Soft tissue findings: limited visualization    MSK ultrasound:     MSK indications: pain      MSK assessment of:  Muscle    MSK extremities evaluated: right lower extremity      Fluid Collection: Absent      Joint Effusion: Absent      Tendon Injury: Absent      Fractured bone: Absent      Muscle Injury: Absent    Interpretation:     Soft tissue impressions: no soft tissue abnormality noted      MSK impressions: no abnormality identified            ED Course  ED Course as of 07/01/23 1540   Sat Jul 01, 2023   1102 Blood Pressure: 170/75   1102 Temperature: 97.9 °F (36.6 °C)   1102 Temp Source: Oral   1102 Pulse(!): 107   1102 Respirations: 18   1102 SpO2: 95 %   1125 Patient is 79-year-old female presenting to the emergency department for evaluation of sudden onset right knee pain while walking up the stairs. On evaluation patient is able to contract the quadriceps moving the knee up, patient is able to kick out however it hurts to do so. Patient does not want to straighten the knee secondary to pain. Point-of-care ultrasound showed no DVT in the popliteal artery easily compressible it will be in PACS. There is no fluid behind the knee. Knee testing negative for obvious acute ligamentous injury. Distal pulses are intact sensation is also intact. Will evaluate patient with x-ray. Regardless of what it shows we will have patient follow-up with orthopedics for further evaluation. Will give patient Motrin as she already took Tylenol. The extremity is not red hot or swollen. Leg is said distal pulses are intact ultrasound showed patent popliteal vein easily compressible. Patient is aware she has no questions or concerns we will frequently reevaluate   1218 Will r/o dvt as possible cause of apin with VAS lower extremity duplex. Patient is aware. Nehemias Simone t this time. 1325 Called [facility] tech to evaluate patient. 1040 [facility] is on way for DVT scan   1532 Negative for dvt               Identification of Seniors at [facility] Most Recent Value   (ISAR) Identification of Seniors at Risk    Before the illness or injury that brought you to the Emergency, did you need someone to help you on a regular basis? 0 Filed at: 07/01/2023 1031   In the last 24 hours, have you needed more help than usual? 0 Filed at: 07/01/2023 1031   Have you been hospitalized for one or more nights during the past 6 months? 0 Filed at: 07/01/2023 1031   In general, do you see well? 0 Filed at: 07/01/2023 1031   In general, do you have serious problems with your memory? 0 Filed at: 07/01/2023 1031   Do you take more than three different medications every day? 1 Filed at: 07/01/2023 1031   ISAR Score 1 Filed at: 07/01/2023 1031                    SBIRT 22yo+    Flowsheet Row Most Recent Value   Initial Alcohol Screen: US AUDIT-C     1. How often do you have a drink containing alcohol? 0 Filed at: 07/01/2023 1031   2. How many drinks containing alcohol do you have on a typical day you are drinking? 0 Filed at: 07/01/2023 1031   3b. FEMALE Any Age, or MALE 65+: How often do you have 4 or more drinks on one occassion? 0 Filed at: 07/01/2023 1031   Audit-C Score 0 Filed at: 07/01/2023 1031   HARMONY: How many times in the past year have you. ..     Used an illegal drug or used a prescription medication for non-medical reasons? Never Filed at: 07/01/2023 1031                MDM      Disposition  Final diagnoses:   Posterior right knee pain     Time reflects when diagnosis was documented in both MDM as applicable and the Disposition within this note     Time User Action Codes Description Comment    7/1/2023 11:46 AM Maria A Botello Add [M25.561] Posterior right knee pain       ED Disposition     ED Disposition   Discharge    Condition   Stable    Date/Time   Sat Jul 1, 2023 11:46 AM    Comment   Cary Comes discharge to home/self care. Follow-up Information     Follow up With Specialties Details Why Contact Info Additional Information    Prabhu Angulo MD Family Medicine Schedule an appointment as soon as possible for a visit  for follow up [facility] [facility] [place] [address] [facility] Emergency Department Emergency Medicine Go to  As needed, If symptoms worsen [address] [id number]  [facility] Emergency Department, [address] [place] [address], [address] [facility] Schedule an appointment as soon as possible for a visit  for follow up [address] [id number] [phone] [facility], [address] [place] [address], [address]  Use Entrance A           Patient's Medications   Discharge Prescriptions    No medications on file     No discharge procedures on file. PDMP Review     None           ED Provider  Attending physically available and evaluated Cary Boyd. I managed the patient along with the ED Attending.     Electronically Signed by         Yasmine Ya DO  07/01/23 7405

## 2023-07-02 PROCEDURE — 93971 EXTREMITY STUDY: CPT | Performed by: SURGERY

## 2023-07-05 ENCOUNTER — OFFICE VISIT (OUTPATIENT)
Dept: FAMILY MEDICINE CLINIC | Facility: CLINIC | Age: 73
End: 2023-07-05
Payer: MEDICARE

## 2023-07-05 VITALS
OXYGEN SATURATION: 95 % | TEMPERATURE: 98.1 F | HEIGHT: 64 IN | WEIGHT: 193 LBS | RESPIRATION RATE: 16 BRPM | DIASTOLIC BLOOD PRESSURE: 80 MMHG | HEART RATE: 78 BPM | BODY MASS INDEX: 32.95 KG/M2 | SYSTOLIC BLOOD PRESSURE: 142 MMHG

## 2023-07-05 DIAGNOSIS — S86.911D STRAIN OF RIGHT KNEE, SUBSEQUENT ENCOUNTER: Primary | ICD-10-CM

## 2023-07-05 PROCEDURE — 99213 OFFICE O/P EST LOW 20 MIN: CPT | Performed by: FAMILY MEDICINE

## 2023-07-05 RX ORDER — MELOXICAM 15 MG/1
15 TABLET ORAL DAILY
Qty: 30 TABLET | Refills: 3 | Status: SHIPPED | OUTPATIENT
Start: 2023-07-05

## 2023-07-05 NOTE — PROGRESS NOTES
Chief Complaint   Patient presents with   • Follow-up     In ED for posterior right knee pain        HPI   Here because she injured her right knee. Injury occurred 4 days ago. She was coming up the steps and felt something strain in the back of her right knee. Had to limp up the steps. No previous injury to her knee. She went to the emergency room. She had an x-ray, ultrasound, and Doppler. X-ray was unremarkable except for mild tricompartment narrowing. Duplex scan was negative. She was not prescribed any medication. States that she is 50% better. Sometimes limping but sometimes can walk like normal.    Past Medical History:   Diagnosis Date   • Basal cell adenocarcinoma 2011   • Cancer [facility])    • Family history of colon cancer in father 11/17/2022   • Kidney stone    • Melanoma ([address] [facility]) 2010   • Melanoma in situ ([address]) 03/21/2018   • Nephrolithiasis    • Shingles    • Skin cancer, basal cell     right arm        Past Surgical History:   Procedure Laterality Date   • FRACTURE SURGERY  11/89    rt ankle   • TUBAL LIGATION  3/86       Social History     Tobacco Use   • Smoking status: Never   • Smokeless tobacco: Never   Substance Use Topics   • Alcohol use: Yes     Alcohol/week: 3.0 standard drinks of alcohol     Types: 3 Glasses of wine per week     Comment: social       Social History     Social History Narrative     since 1973. 3 children. 10 grandchildren. 8 nearby. The other in [place]. Retired at age 66 when he 1st grandchild was born. . 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          The following portions of the patient's history were reviewed and updated as appropriate: allergies, current medications, past family history, past medical history, past social history, past surgical history and problem list.      Review of Systems       /80 (BP Location: Left arm, Patient Position: Sitting, Cuff Size: Standard)   Pulse 78   Temp 98.1 °F (36.7 °C) (Temporal)   Resp 16   Ht 5' 4" (1.626 m)   Wt 87.5 kg (193 lb)   SpO2 95%   BMI 33.13 kg/m²      Physical Exam   Able to walk with minimal limp. There is no apparent effusion of the right knee. Good range of motion. There is mild tenderness posteriorly and also lateral and medial.  There is no apparent weakness with strain along the medial collateral or the lateral collateral.                Current Outpatient Medications:   •  atorvastatin (LIPITOR) 10 mg tablet, TAKE ONE-HALF TABLET BY MOUTH DAILY, Disp: 120 tablet, Rfl: 3  •  Calcium-Magnesium-Vitamin D (CALCIUM MAGNESIUM PO), Take 1 tablet by mouth daily, Disp: , Rfl:   •  cholecalciferol (VITAMIN D3) 96030 units capsule, Take by mouth, Disp: , Rfl:   •  ezetimibe (ZETIA) 10 mg tablet, Take 1 tablet (10 mg total) by mouth daily, Disp: 90 tablet, Rfl: 3  •  meloxicam (MOBIC) 15 mg tablet, Take 1 tablet (15 mg total) by mouth daily, Disp: 30 tablet, Rfl: 3  •  Multiple Vitamin (MULTIVITAMINS PO), Take 1 tablet by mouth daily, Disp: , Rfl:      No problem-specific Assessment & Plan notes found for this encounter. Diagnoses and all orders for this visit:    Strain of right knee, subsequent encounter  -     meloxicam (MOBIC) 15 mg tablet; Take 1 tablet (15 mg total) by mouth daily        Patient Instructions   Strained right knee has improved over the last 4 days without medication. Trial of meloxicam 15 mg once daily which is an anti-inflammatory. Okay to also use Tylenol 3 times a day if the knee hurts. She does have an orthopedic appointment but can cancel it if she is feeling better in another week. Follow-up as scheduled and happy birthday!

## 2023-07-05 NOTE — PATIENT INSTRUCTIONS
Strained right knee has improved over the last 4 days without medication. Trial of meloxicam 15 mg once daily which is an anti-inflammatory. Okay to also use Tylenol 3 times a day if the knee hurts. She does have an orthopedic appointment but can cancel it if she is feeling better in another week. Follow-up as scheduled and happy birthday!

## 2023-07-20 ENCOUNTER — OFFICE VISIT (OUTPATIENT)
Dept: OBGYN CLINIC | Facility: OTHER | Age: 73
End: 2023-07-20

## 2023-07-20 VITALS
BODY MASS INDEX: 32.95 KG/M2 | HEIGHT: 64 IN | DIASTOLIC BLOOD PRESSURE: 81 MMHG | HEART RATE: 64 BPM | SYSTOLIC BLOOD PRESSURE: 143 MMHG | WEIGHT: 193 LBS

## 2023-07-20 DIAGNOSIS — M23.91 INTERNAL DERANGEMENT OF RIGHT KNEE: Primary | ICD-10-CM

## 2023-07-20 RX ORDER — BETAMETHASONE SODIUM PHOSPHATE AND BETAMETHASONE ACETATE 3; 3 MG/ML; MG/ML
6 INJECTION, SUSPENSION INTRA-ARTICULAR; INTRALESIONAL; INTRAMUSCULAR; SOFT TISSUE
Status: COMPLETED | OUTPATIENT
Start: 2023-07-20 | End: 2023-07-20

## 2023-07-20 RX ORDER — BUPIVACAINE HYDROCHLORIDE 2.5 MG/ML
2 INJECTION, SOLUTION INFILTRATION; PERINEURAL
Status: COMPLETED | OUTPATIENT
Start: 2023-07-20 | End: 2023-07-20

## 2023-07-20 RX ADMIN — BETAMETHASONE SODIUM PHOSPHATE AND BETAMETHASONE ACETATE 6 MG: 3; 3 INJECTION, SUSPENSION INTRA-ARTICULAR; INTRALESIONAL; INTRAMUSCULAR; SOFT TISSUE at 08:45

## 2023-07-20 RX ADMIN — BUPIVACAINE HYDROCHLORIDE 2 ML: 2.5 INJECTION, SOLUTION INFILTRATION; PERINEURAL at 08:45

## 2023-07-20 NOTE — PATIENT INSTRUCTIONS
CORTICOSTEROID INJECTION  What is a corticosteroid? Injuries or disease such as arthritis, bursitis or tendonitis result in inflammation. In turn, this inflammation can cause swelling and pain. A local injection of a corticosteroid is provided to diminish inflammation. By doing so, it will also decrease pain and swelling which is making you uncomfortable. Is this the same thing as a Cortisone Injection? Cortisone® is a brand name of a corticosteroid used commonly in the past.  Today I commonly use a more water-soluble corticosteroid named Celestone®. Will the injection hurt? As with any injection, you may feel pain at the time of the injection. Typically, I use a local anesthetic ([place] Ear) in addition to the corticosteroid to determine if the injection has been placed in the appropriate location. Hence it is important to monitor your symptoms 4-6 hours after the injection, as the area will be anesthetized (numb) while the local anesthetic is working. Once the local anesthetic wears off, the intensity of pain can be the same as it was prior to the injection, or even worse. This does not mean that the injection is not working. The corticosteroid may take 24-72 hours to begin having a positive effect. If you do experience an increase in pain, the use of an ice pack on the area for 20 minutes at a time should help. It is also helpful to take an oral anti-inflammatory such as Tylenol® or Motrin® if you are able to medically do so. For this reason it is best to avoid activities that put stress on the area the first 24 hours after the injection. How long will pain relief last?  This will vary according to the type and severity of the symptoms being treated and the severity of the condition. Symptom relief may last weeks to months. I typically couple injections with physical therapy so that the underlying problem causing the inflammation may be treated as the pain diminishes.   If the combination is not successful, you may be a surgical candidate. I have read bad things about steroids. Will these things happen to me? Corticosteroids, when utilized properly, are safe and effective drugs. When used in a low dose, potential adverse reactions are very rare. Some patients may experience a sensation of flushing for several days. Very rarely, there can be a local reaction which may include increased discomfort for a period of time in the areas that has been injected. A steroid should not be used over and over again. Multiple injections in the same area can produce adverse effects such as tissue atrophy and degeneration of tendon or cartilage. A small percentage of patients (< 0.1%) may develop an infection in the joint after injection. This is a treatable problem, but if neglected, may result in permanent disability. Signs of infection include redness, swelling, discharge, fevers, increasing pain and drainage from the injection site. This represents an emergency and you should contact our office immediately or seek treatment in the ER if after hours. If I have diabetes, will this injection affect me? If you are diabetic, an injection of a corticosteroid can raise your blood sugar level, requiring more insulin for a brief period of time. This may necessitate careful blood sugar maintenance. If the elevated sugars are not able to be controlled, contact your diabetic doctor for guidance.

## 2023-07-20 NOTE — PROGRESS NOTES
Assessment  Diagnoses and all orders for this visit:    Internal derangement of right knee    Discussion and Plan:    · Explained to the patient that her symptoms and physical examination is worrisome for a possible medial meniscal tear of her right knee. The pathoanatomy and natural history of this diagnosis was explained to the patient in detail in the office today. · She was provided with a cortisone injection today into her right knee. This is documented appropriately below. · She was also provided with a referral to formal physical therapy/HEP  · If symptoms persist then an MRI of the right knee would be warranted. · She will follow up in 6-8 weeks for re evaluation of symptoms    Subjective:   Patient ID: Kaitlynn Menezes is a 72 y.o. female    The patient presents with a chief complaint of right knee pain. The pain began a few week(s) ago and is not associated with an acute injury. Although patient reports that she was walking up stairs and felt a sharp pain about the posteromedial aspect of the knee. The patient describes the pain as aching and sharp and 5 out of 10 in intensity. It is intermittent, occurring with increasing frequency in timing, and localizes the pain to the medial joint line, popliteal.    The pain is worse with activities, ascending stairs, descending stairs and squatting and relieved with rest, ice, avoiding painful activities. She reports mechanical symptoms such as locking and catching. She reports instability of the knee. Patient has had no prior treatment.         The following portions of the patient's history were reviewed and updated as appropriate: allergies, current medications, past family history, past medical history, past social history, past surgical history and problem list.    Objective:  /81 (BP Location: Left arm, Patient Position: Sitting, Cuff Size: Large)   Pulse 64   Ht 5' 4" (1.626 m) Comment: verbal  Wt 87.5 kg (193 lb)   BMI 33.13 kg/m² Right Knee Exam     Range of Motion   Extension: 0   Flexion: 120     Tests   Varus: negative Valgus: negative    Other   Erythema: absent  Sensation: normal  Pulse: present  Effusion: no effusion present            Physical Exam  Constitutional:       Appearance: She is well-developed. Eyes:      Pupils: Pupils are equal, round, and reactive to light. Pulmonary:      Effort: Pulmonary effort is normal.      Breath sounds: Normal breath sounds. Musculoskeletal:      Right knee: No effusion. Skin:     General: Skin is warm and dry. Neurological:      Mental Status: She is alert and oriented to person, place, and time. Psychiatric:         Behavior: Behavior normal.         Thought Content: Thought content normal.         Judgment: Judgment normal.         Large joint arthrocentesis: R knee  Universal Protocol:  Consent: Verbal consent obtained. Risks and benefits: risks, benefits and alternatives were discussed  Consent given by: patient  Time out: Immediately prior to procedure a "time out" was called to verify the correct patient, procedure, equipment, support staff and site/side marked as required. Site marked: the operative site was marked  Radiology Images displayed and confirmed.  If images not available, report reviewed: imaging studies available  Patient identity confirmed: verbally with patient    Supporting Documentation  Indications: pain and diagnostic evaluation   Procedure Details  Location: knee - R knee  Preparation: Patient was prepped and draped in the usual sterile fashion  Needle size: 22 G  Ultrasound guidance: no  Approach: lateral  Medications administered: 2 mL bupivacaine 0.25 %; 6 mg betamethasone acetate-betamethasone sodium phosphate 6 (3-3) mg/mL    Patient tolerance: patient tolerated the procedure well with no immediate complications  Dressing:  Sterile dressing applied            I have personally reviewed pertinent films in PACS and my interpretation is as follows. X Ray Right Knee 7/1/23: Minimal tricompartmental osteoarthritis. No other acute osseous abnormalities.      Scribe Attestation    I,:  Katerina Duarte am acting as a scribe while in the presence of the attending physician.:       I,:  Aleksandra Kim MD personally performed the services described in this documentation    as scribed in my presence.:

## 2023-07-26 ENCOUNTER — EVALUATION (OUTPATIENT)
Dept: PHYSICAL THERAPY | Facility: REHABILITATION | Age: 73
End: 2023-07-26
Payer: MEDICARE

## 2023-07-26 DIAGNOSIS — M23.91 INTERNAL DERANGEMENT OF RIGHT KNEE: ICD-10-CM

## 2023-07-26 PROCEDURE — 97162 PT EVAL MOD COMPLEX 30 MIN: CPT

## 2023-07-26 PROCEDURE — 97110 THERAPEUTIC EXERCISES: CPT

## 2023-07-26 NOTE — PROGRESS NOTES
PT Evaluation     Today's date: 2023  Patient name: Thomas Coates  : 1950  MRN: [id number]  Referring provider: Nidia Cardenas  Dx:   Encounter Diagnosis     ICD-10-CM    1. Internal derangement of right knee  M23.91 Ambulatory Referral to Physical Therapy          Start Time: 845  Stop Time: 930  Total time in clinic (min): 45 minutes    Assessment  Assessment details:   Thomas Coates is a pleasant 72 y.o. female who presents with acute right knee pain. David Ripple demonstrates very mild strength and knee ROM deficits on examination today. The impairments listed below are resulting in fear of not being able to keep active and future ill health (and wanting to prevent it). No further referral appears necessary at this time based upon examination results. I expect she will improve in the next 4-6 weeks. Positive prognostic indicators include positive attitude toward recovery, good understanding of diagnosis and treatment plan options, acuity of symptoms and absence of peripheralization. Negative prognostic indicators include obesity. Impairments: abnormal or restricted ROM, activity intolerance, impaired physical strength, lacks appropriate home exercise program and pain with function    Symptom irritability: moderateUnderstanding of Dx/Px/POC: good   Prognosis: good    Goals  Short Term Goals (Week 4):  1. Decreased pain by 50%  2. Improve ROM by 10 degrees   3. Improve strength by 1/2 measure  4. GROC >50%      Long Term Goals (8 weeks):  1. Patient will exceed FOTO predicted outcome score  2. Patient will be fully independent with HEP by discharge  3. Patient will be able to manage symptoms independently.        Plan  Patient would benefit from: skilled physical therapy  Planned modality interventions: low level laser therapy and electrical stimulation/Russian stimulation  Planned therapy interventions: IASTM, joint mobilization, manual therapy, massage, activity modification, patient education, neuromuscular re-education, strengthening, stretching, therapeutic activities, therapeutic exercise, home exercise program, graded activity, functional ROM exercises and flexibility  Frequency: 1x week  Duration in visits: 6  Duration in weeks: 6  Treatment plan discussed with: patient        Subjective Evaluation    History of Present Illness  Mechanism of injury: Patient presents to PT 1 month following acute right pain. These symptoms started after she felt a pop in her knee while going up the stairs. Patient reports symptoms initially were localized to posteromedial pain. Patient denies any significant swelling and/or bruising. Patient reports she was unable to bend her knee but this has since improved. Patient reports her symptoms improved following CSI 23 when she saw ortho. Patient reports her symptoms are now localized to anteromedial knee and lateral knee. Patient reports her symptoms are aggravated with stairs, going up/down hills, and occasional with sleeping. Patient reports her symptoms are improved with rest, NSAIDs, and CSI.    Patient Goals  Patient goals for therapy: decreased pain, increased motion, increased strength, return to sport/leisure activities and independence with ADLs/IADLs    Pain  Current pain ratin  At worst pain ratin  Quality: dull ache          Objective  Range of Motion  Right Knee:  0-135   Slight posterolateral knee discomfort with flexion OP    Left Knee:  0-135      Myotomes/Strength Testing  Right Knee:   Ext (5/5), Flex (4+/5)    Left Knee:  Ext (5/5), Flex (/55)       Neurodynamic Testing  SLR (-)  Slump Test (-)   Femoral Nerve Tension Test (-)      Special Tests  Ligaments:  Varus (-), Valgus (-), Lachmans (-), Ant Drawer (-), Post Drawer (-)    Mensicus:  Joint Line Tenderness (-), Extension OP (-), Flexion OP (slight discomfort) Ginette's (-), Catching/Locking (-)    Patella:  Apprehension: (-)      Functional Testing  Forward Lunge Test  Reduced anterior tibial translation of right knee compared to left knee    Step Up  WNL pain free       Precautions: standard      Manuals 7/26                                                                Neuro Re-Ed                                                                                                        Ther Ex             Bridge w/ Hip Abd Iso HEP            Prone Knee Flexion Stretch HEP            Prone Knee Flexion AROM HEP            Pt Edu, HEP, POC PRR                                                                Ther Activity                                       Gait Training                                       Modalities

## 2023-08-08 ENCOUNTER — OFFICE VISIT (OUTPATIENT)
Dept: PHYSICAL THERAPY | Facility: REHABILITATION | Age: 73
End: 2023-08-08
Payer: MEDICARE

## 2023-08-08 DIAGNOSIS — M23.91 INTERNAL DERANGEMENT OF RIGHT KNEE: Primary | ICD-10-CM

## 2023-08-08 PROCEDURE — 97140 MANUAL THERAPY 1/> REGIONS: CPT

## 2023-08-08 NOTE — PROGRESS NOTES
Daily Note     Today's date: 2023  Patient name: Kvng Michaels  : 1950  MRN: [id number]  Referring provider: Jessica Red  Dx:   Encounter Diagnosis     ICD-10-CM    1. Internal derangement of right knee  M23.91           Start Time: 915  Stop Time: 936  Total time in clinic (min): 21 minutes    Subjective: Patient reports she is doing very well overall. Patient reports no pain in her knee over the last week. Patient states some creptius in her patella after static positioning of the knee. Objective: See treatment diary below  Knee Range of Motion  Left: 0-130  Right:  0-130    Assessment: Patient doing significantly better since last visit with HEP and slow increases in activity levels. Full pain free knee ROM with testing today. All meniscal testing negative today compared to last visit. Will see patient back in 2 weeks, if patient continues to improve and have no pain will consider discharge to [facility]. Patient would benefit from continued PT      Plan: Continue per plan of care.       Precautions: standard      Manuals                                                   Re-Assessment  15'           Neuro Re-Ed                                                                                                        Ther Ex             Bridge w/ Hip Abd Iso HEP reviewed           Prone Knee Flexion Stretch HEP reviewed           Prone Knee Flexion AROM HEP reviewed           Pt Edu, HEP, POC PRR reviewed           LAQ  4# aw 1x10 ea  5# aw 1x10 ea                                                  Ther Activity                                       Gait Training                                       Modalities

## 2023-08-23 ENCOUNTER — OFFICE VISIT (OUTPATIENT)
Dept: PHYSICAL THERAPY | Facility: REHABILITATION | Age: 73
End: 2023-08-23
Payer: MEDICARE

## 2023-08-23 DIAGNOSIS — M23.91 INTERNAL DERANGEMENT OF RIGHT KNEE: Primary | ICD-10-CM

## 2023-08-23 PROCEDURE — 97140 MANUAL THERAPY 1/> REGIONS: CPT

## 2023-08-23 NOTE — PROGRESS NOTES
Discharge Note     Today's date: 2023  Patient name: Lou Alexandre  : 1950  MRN: [id number]  Referring provider: Brea Jasso  Dx:   Encounter Diagnosis     ICD-10-CM    1. Internal derangement of right knee  M23.91           Start Time: 1030  Stop Time: 1045  Total time in clinic (min): 15 minutes    Subjective: Patient reports no pain since last visit. Patient reports continued compliance with HEP. States some popping in her knee cap but otherwise her knee feels good. Pain Levels  0/10      Objective: See treatment diary below      Assessment:   Patient is pain free and not limited by her knee at this time. All goals met that were established at initial evaluation have been met. Will discharge to [facility] at this time, patient will f/u with PT as needed. Goals  Short Term Goals (Week 4): met all  1. Decreased pain by 50%  2. Improve ROM by 10 degrees   3. Improve strength by 1/2 measure  4. GROC >50%      Long Term Goals (8 weeks): met all  1. Patient will exceed FOTO predicted outcome score  2. Patient will be fully independent with [facility] by discharge  3. Patient will be able to manage symptoms independently. Plan: Continue per plan of care.        FOTO  23 (IE): 67/74  23: 90/74    Precautions: standard      Manuals                                     FOTO   perf          Re-Assessment  15' 15'          Neuro Re-Ed                                                                                                        Ther Ex             Bridge w/ Hip Abd Iso HEP reviewed reviewed          Prone Knee Flexion Stretch HEP reviewed reviewed          Prone Knee Flexion AROM HEP reviewed reviewed          Pt Edu, HEP, POC PRR reviewed reviewed          LAQ  4# aw 1x10 ea  5# aw 1x10 ea reviewed                                                 Ther Activity                                       Gait Training                                       Modalities

## 2023-09-14 ENCOUNTER — OFFICE VISIT (OUTPATIENT)
Dept: OBGYN CLINIC | Facility: OTHER | Age: 73
End: 2023-09-14
Payer: MEDICARE

## 2023-09-14 VITALS
BODY MASS INDEX: 33.09 KG/M2 | HEART RATE: 67 BPM | HEIGHT: 64 IN | WEIGHT: 193.8 LBS | SYSTOLIC BLOOD PRESSURE: 128 MMHG | DIASTOLIC BLOOD PRESSURE: 79 MMHG

## 2023-09-14 DIAGNOSIS — M23.91 INTERNAL DERANGEMENT OF RIGHT KNEE: Primary | ICD-10-CM

## 2023-09-14 PROCEDURE — 99213 OFFICE O/P EST LOW 20 MIN: CPT | Performed by: PHYSICIAN ASSISTANT

## 2023-09-14 NOTE — PROGRESS NOTES
Assessment  Diagnoses and all orders for this visit:    Internal derangement of right knee      Discussion and Plan:    [facility] may resume activities to tolerance. She will continue with her HEP as instructed by the therapist.  If her pain returns, she will contact the office. Tylenol PRN    Subjective:   Patient ID: Abbie Montes is a 72 y.o. female    [facility] presents to the office in follow up of the right knee. She was seen for acute knee pain. She was provided a steroid injection for pain and given a referral to physical therapy. [facility] reports near complete resolution of her right knee pain. She is able to navigate stairs without pain. She denies pain when walking outside. She is pleased with her progress. She is compliant with her HEP. Denies new injury or trauma    The following portions of the patient's history were reviewed and updated as appropriate: allergies, current medications, past family history, past medical history, past social history, past surgical history and problem list.    Review of Systems   Constitutional: Negative for chills and fever. HENT: Negative for hearing loss. Eyes: Negative for visual disturbance. Respiratory: Negative for shortness of breath. Cardiovascular: Negative for chest pain. Gastrointestinal: Negative for abdominal pain. Musculoskeletal:        As reviewed in the HPI   Skin: Negative for rash. Neurological:        As reviewed in the HPI   Psychiatric/Behavioral: Negative for agitation. Objective:  /79   Pulse 67   Ht 5' 4" (1.626 m)   Wt 87.9 kg (193 lb 12.8 oz)   BMI 33.27 kg/m²       Right Knee Exam     Tenderness   The patient is experiencing tenderness in the medial joint line. Range of Motion   The patient has normal right knee ROM.     Tests   Ginette:  Medial - negative Lateral - negative    Other   Erythema: absent  Sensation: normal  Pulse: present  Swelling: none            Physical Exam  Constitutional: Appearance: She is well-developed. HENT:      Head: Normocephalic. Pulmonary:      Breath sounds: Normal breath sounds. No wheezing. Musculoskeletal:      Cervical back: Normal range of motion. Right knee:      Instability Tests: Medial Ginette test negative and lateral Ginette test negative. Skin:     General: Skin is warm and dry. Neurological:      Mental Status: She is alert and oriented to person, place, and time. Psychiatric:         Behavior: Behavior normal.         Thought Content:  Thought content normal.         Judgment: Judgment normal.

## 2023-11-16 ENCOUNTER — RA CDI HCC (OUTPATIENT)
Dept: OTHER | Facility: HOSPITAL | Age: 73
End: 2023-11-16

## 2023-11-21 ENCOUNTER — OFFICE VISIT (OUTPATIENT)
Dept: FAMILY MEDICINE CLINIC | Facility: CLINIC | Age: 73
End: 2023-11-21
Payer: MEDICARE

## 2023-11-21 VITALS
OXYGEN SATURATION: 97 % | WEIGHT: 192 LBS | DIASTOLIC BLOOD PRESSURE: 80 MMHG | SYSTOLIC BLOOD PRESSURE: 122 MMHG | HEIGHT: 64 IN | HEART RATE: 77 BPM | RESPIRATION RATE: 16 BRPM | BODY MASS INDEX: 32.78 KG/M2 | TEMPERATURE: 97.6 F

## 2023-11-21 DIAGNOSIS — E78.2 MIXED HYPERLIPIDEMIA: ICD-10-CM

## 2023-11-21 DIAGNOSIS — Z00.00 MEDICARE ANNUAL WELLNESS VISIT, SUBSEQUENT: Primary | ICD-10-CM

## 2023-11-21 PROCEDURE — 99213 OFFICE O/P EST LOW 20 MIN: CPT | Performed by: FAMILY MEDICINE

## 2023-11-21 PROCEDURE — G0439 PPPS, SUBSEQ VISIT: HCPCS | Performed by: FAMILY MEDICINE

## 2023-11-21 RX ORDER — ATORVASTATIN CALCIUM 10 MG/1
10 TABLET, FILM COATED ORAL DAILY
Qty: 90 TABLET | Refills: 3 | Status: SHIPPED | OUTPATIENT
Start: 2023-11-21

## 2023-11-21 NOTE — PATIENT INSTRUCTIONS
Medicare wellness exam is completed. Looks great. Recheck lipid profile. Immunizations are up-to-date except for COVID booster which she will soon get. If able to tolerate 10 mg of atorvastatin, suggest doing that along with the Zetia. Hopefully mother will recover from her current hospitalization. Recheck in 1 year. Medicare Preventive Visit Patient Instructions  Thank you for completing your Welcome to Medicare Visit or Medicare Annual Wellness Visit today. Your next wellness visit will be due in one year (11/21/2024). The screening/preventive services that you may require over the next 5-10 years are detailed below. Some tests may not apply to you based off risk factors and/or age. Screening tests ordered at today's visit but not completed yet may show as past due. Also, please note that scanned in results may not display below. Preventive Screenings:  Service Recommendations Previous Testing/Comments   Colorectal Cancer Screening  * Colonoscopy    * Fecal Occult Blood Test (FOBT)/Fecal Immunochemical Test (FIT)  * Fecal DNA/Cologuard Test  * Flexible Sigmoidoscopy Age: 43-73 years old   Colonoscopy: every 10 years (may be performed more frequently if at higher risk)  OR  FOBT/FIT: every 1 year  OR  Cologuard: every 3 years  OR  Sigmoidoscopy: every 5 years  Screening may be recommended earlier than age 39 if at higher risk for colorectal cancer. Also, an individualized decision between you and your healthcare provider will decide whether screening between the ages of 77-80 would be appropriate. Colonoscopy: 12/10/2020  FOBT/FIT: Not on file  Cologuard: Not on file  Sigmoidoscopy: Not on file    Screening Current     Breast Cancer Screening Age: 36 years old  Frequency: every 1-2 years  Not required if history of left and right mastectomy Mammogram: 02/20/2023    Screening Current   Cervical Cancer Screening Between the ages of 21-29, pap smear recommended once every 3 years.    Between the ages of 30-65, can perform pap smear with HPV co-testing every 5 years. Recommendations may differ for women with a history of total hysterectomy, cervical cancer, or abnormal pap smears in past. Pap Smear: Not on file    Screening Not Indicated   Hepatitis C Screening Once for adults born between 1945 and 1965  More frequently in patients at high risk for Hepatitis C Hep C Antibody: 10/07/2019    Screening Current   Diabetes Screening 1-2 times per year if you're at risk for diabetes or have pre-diabetes Fasting glucose: 90 mg/dL (11/10/2021)  A1C: No results in last 5 years (No results in last 5 years)      Cholesterol Screening Once every 5 years if you don't have a lipid disorder. May order more often based on risk factors. Lipid panel: 11/17/2022    Screening Not Indicated  History Lipid Disorder     Other Preventive Screenings Covered by Medicare:  Abdominal Aortic Aneurysm (AAA) Screening: covered once if your at risk. You're considered to be at risk if you have a family history of AAA. Lung Cancer Screening: covers low dose CT scan once per year if you meet all of the following conditions: (1) Age 48-67; (2) No signs or symptoms of lung cancer; (3) Current smoker or have quit smoking within the last 15 years; (4) You have a tobacco smoking history of at least 20 pack years (packs per day multiplied by number of years you smoked); (5) You get a written order from a healthcare provider.   Glaucoma Screening: covered annually if you're considered high risk: (1) You have diabetes OR (2) Family history of glaucoma OR (3)  aged 48 and older OR (3)  American aged 72 and older  Osteoporosis Screening: covered every 2 years if you meet one of the following conditions: (1) You're estrogen deficient and at risk for osteoporosis based off medical history and other findings; (2) Have a vertebral abnormality; (3) On glucocorticoid therapy for more than 3 months; (4) Have primary hyperparathyroidism; (5) On osteoporosis medications and need to assess response to drug therapy. Last bone density test (DXA Scan): 02/16/2022. HIV Screening: covered annually if you're between the age of 14-79. Also covered annually if you are younger than 13 and older than 72 with risk factors for HIV infection. For pregnant patients, it is covered up to 3 times per pregnancy. Immunizations:  Immunization Recommendations   Influenza Vaccine Annual influenza vaccination during flu season is recommended for all persons aged >= 6 months who do not have contraindications   Pneumococcal Vaccine   * Pneumococcal conjugate vaccine = PCV13 (Prevnar 13), PCV15 (Vaxneuvance), PCV20 (Prevnar 20)  * Pneumococcal polysaccharide vaccine = PPSV23 (Pneumovax) Adults 28-27 yo with certain risk factors or if 69+ yo  If never received any pneumonia vaccine: recommend Prevnar 20 (PCV20)  Give PCV20 if previously received 1 dose of PCV13 or PPSV23   Hepatitis B Vaccine 3 dose series if at intermediate or high risk (ex: diabetes, end stage renal disease, liver disease)   Respiratory syncytial virus (RSV) Vaccine - COVERED BY MEDICARE PART D  * RSVPreF3 (Arexvy) CDC recommends that adults 61years of age and older may receive a single dose of RSV vaccine using shared clinical decision-making (SCDM)   Tetanus (Td) Vaccine - COST NOT COVERED BY MEDICARE PART B Following completion of primary series, a booster dose should be given every 10 years to maintain immunity against tetanus. Td may also be given as tetanus wound prophylaxis. Tdap Vaccine - COST NOT COVERED BY MEDICARE PART B Recommended at least once for all adults. For pregnant patients, recommended with each pregnancy.    Shingles Vaccine (Shingrix) - COST NOT COVERED BY MEDICARE PART B  2 shot series recommended in those 19 years and older who have or will have weakened immune systems or those 50 years and older     Health Maintenance Due:      Topic Date Due    Breast Cancer Screening: Mammogram  02/20/2025    Colorectal Cancer Screening  12/10/2025    Hepatitis C Screening  Completed     Immunizations Due:      Topic Date Due    COVID-19 Vaccine (5 - Pfizer series) 03/02/2023    Influenza Vaccine (1) 09/01/2023     Advance Directives   What are advance directives? Advance directives are legal documents that state your wishes and plans for medical care. These plans are made ahead of time in case you lose your ability to make decisions for yourself. Advance directives can apply to any medical decision, such as the treatments you want, and if you want to donate organs. What are the types of advance directives? There are many types of advance directives, and each state has rules about how to use them. You may choose a combination of any of the following:  Living will: This is a written record of the treatment you want. You can also choose which treatments you do not want, which to limit, and which to stop at a certain time. This includes surgery, medicine, IV fluid, and tube feedings. Durable power of  for healthcare [facility]): This is a written record that states who you want to make healthcare choices for you when you are unable to make them for yourself. This person, called a proxy, is usually a family member or a friend. You may choose more than 1 proxy. Do not resuscitate (DNR) order:  A DNR order is used in case your heart stops beating or you stop breathing. It is a request not to have certain forms of treatment, such as CPR. A DNR order may be included in other types of advance directives. Medical directive: This covers the care that you want if you are in a coma, near death, or unable to make decisions for yourself. You can list the treatments you want for each condition. Treatment may include pain medicine, surgery, blood transfusions, dialysis, IV or tube feedings, and a ventilator (breathing machine). Values history:   This document has questions about your views, beliefs, and how you feel and think about life. This information can help others choose the care that you would choose. Why are advance directives important? An advance directive helps you control your care. Although spoken wishes may be used, it is better to have your wishes written down. Spoken wishes can be misunderstood, or not followed. Treatments may be given even if you do not want them. An advance directive may make it easier for your family to make difficult choices about your care. Weight Management   Why it is important to manage your weight:  Being overweight increases your risk of health conditions such as heart disease, high blood pressure, type 2 diabetes, and certain types of cancer. It can also increase your risk for osteoarthritis, sleep apnea, and other respiratory problems. Aim for a slow, steady weight loss. Even a small amount of weight loss can lower your risk of health problems. How to lose weight safely:  A safe and healthy way to lose weight is to eat fewer calories and get regular exercise. You can lose up about 1 pound a week by decreasing the number of calories you eat by 500 calories each day. Healthy meal plan for weight management:  A healthy meal plan includes a variety of foods, contains fewer calories, and helps you stay healthy. A healthy meal plan includes the following:  Eat whole-grain foods more often. A healthy meal plan should contain fiber. Fiber is the part of grains, fruits, and vegetables that is not broken down by your body. Whole-grain foods are healthy and provide extra fiber in your diet. Some examples of whole-grain foods are whole-wheat breads and pastas, oatmeal, brown rice, and bulgur. Eat a variety of vegetables every day. Include dark, leafy greens such as spinach, kale, mauri greens, and mustard greens. Eat yellow and orange vegetables such as carrots, sweet potatoes, and winter squash. Eat a variety of fruits every day.   Choose fresh or canned fruit (canned in its own juice or light syrup) instead of juice. Fruit juice has very little or no fiber. Eat low-fat dairy foods. Drink fat-free (skim) milk or 1% milk. Eat fat-free yogurt and low-fat cottage cheese. Try low-fat cheeses such as mozzarella and other reduced-fat cheeses. Choose meat and other protein foods that are low in fat. Choose beans or other legumes such as split peas or lentils. Choose fish, skinless poultry (chicken or turkey), or lean cuts of red meat (beef or pork). Before you cook meat or poultry, cut off any visible fat. Use less fat and oil. Try baking foods instead of frying them. Add less fat, such as margarine, sour cream, regular salad dressing and mayonnaise to foods. Eat fewer high-fat foods. Some examples of high-fat foods include french fries, doughnuts, ice cream, and cakes. Eat fewer sweets. Limit foods and drinks that are high in sugar. This includes candy, cookies, regular soda, and sweetened drinks. Exercise:  Exercise at least 30 minutes per day on most days of the week. Some examples of exercise include walking, biking, dancing, and swimming. You can also fit in more physical activity by taking the stairs instead of the elevator or parking farther away from stores. Ask your healthcare provider about the best exercise plan for you. © Copyright [employer] 2018 Information is for End User's use only and may not be sold, redistributed or otherwise used for commercial purposes.  All illustrations and images included in CareNotes® are the copyrighted property of [employer] or  [employer]

## 2023-11-21 NOTE — PROGRESS NOTES
Assessment and Plan:     Problem List Items Addressed This Visit    None  Visit Diagnoses       Medicare annual wellness visit, subsequent    -  Primary             Preventive health issues were discussed with patient, and age appropriate screening tests were ordered as noted in patient's After Visit Summary. Personalized health advice and appropriate referrals for health education or preventive services given if needed, as noted in patient's After Visit Summary.      History of Present Illness:     Patient presents for a Medicare Wellness Visit    HPI   Patient Care Team:  Chau Guadalupe MD as PCP - General (Family Medicine)  Justino Amaya DO (Orthopedic Surgery)     Review of Systems:     Review of Systems     Problem List:     Patient Active Problem List   Diagnosis    Hyperlipidemia    Vitamin D deficiency    Family history of colon cancer in father    Internal derangement of right knee      Past Medical and Surgical History:     Past Medical History:   Diagnosis Date    Basal cell adenocarcinoma 2011    Cancer [facility])     Family history of colon cancer in father 11/17/2022    Kidney stone     Melanoma ([address]) 2010    Melanoma in situ ([address]) 03/21/2018    Nephrolithiasis     Shingles     Skin cancer, basal cell     right arm     Past Surgical History:   Procedure Laterality Date    FRACTURE SURGERY  11/89    rt ankle    TUBAL LIGATION  3/86      Family History:     Family History   Problem Relation Age of Onset    Hyperlipidemia Mother     Colon cancer Father     Prostate cancer Paternal Uncle     Ovarian cancer Cousin 45    Pancreatic cancer Cousin 39    Colon cancer Family     Hypertension Family     Cancer Family         laryngeal      Social History:     Social History     Socioeconomic History    Marital status: /Civil Union     Spouse name: Not on file    Number of children: Not on file    Years of education: Not on file    Highest education level: Not on file   Occupational History    Not on file Tobacco Use    Smoking status: Never    Smokeless tobacco: Never   Vaping Use    Vaping Use: Never used   Substance and Sexual Activity    Alcohol use: Yes     Alcohol/week: 3.0 standard drinks of alcohol     Types: 3 Glasses of wine per week     Comment: social    Drug use: No    Sexual activity: Not Currently     Partners: Male   Other Topics Concern    Not on file   Social History Narrative     since 1973. 3 children. 10 grandchildren. 8 nearby. The other in [place]. Retired at age 66 when he 1st grandchild was born. . 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 Social Determinants of Health     Financial Resource Strain: Low Risk  (11/14/2023)    Overall Financial Resource Strain (CARDIA)     Difficulty of Paying Living Expenses: Not very hard   Food Insecurity: Not on file   Transportation Needs: No Transportation Needs (11/14/2023)    PRAPARE - Transportation     Lack of Transportation (Medical): No     Lack of Transportation (Non-Medical): No   Physical Activity: Not on file   Stress: Not on file   Social Connections: Not on file   Intimate Partner Violence: Not on file   Housing Stability: Not on file      Medications and Allergies:     Current Outpatient Medications   Medication Sig Dispense Refill    atorvastatin (LIPITOR) 10 mg tablet TAKE ONE-HALF TABLET BY MOUTH DAILY 120 tablet 3    Calcium-Magnesium-Vitamin D (CALCIUM MAGNESIUM PO) Take 1 tablet by mouth daily      cholecalciferol (VITAMIN D3) 66179 units capsule Take by mouth      ezetimibe (ZETIA) 10 mg tablet Take 1 tablet (10 mg total) by mouth daily 90 tablet 3    meloxicam (MOBIC) 15 mg tablet Take 1 tablet (15 mg total) by mouth daily 30 tablet 3    Multiple Vitamin (MULTIVITAMINS PO) Take 1 tablet by mouth daily       No current facility-administered medications for this visit.      Allergies   Allergen Reactions    Ezetimibe-Simvastatin Headache    Pravastatin Myalgia    Rosuvastatin Myalgia Simvastatin Other (See Comments)     Muscle Aches      Immunizations:     Immunization History   Administered Date(s) Administered    COVID-19 PFIZER VACCINE 0.3 ML IM 03/19/2021, 04/08/2021, 11/12/2021    COVID-19 Pfizer Vac BIVALENT Michel-sucrose 12 Yr+ IM 11/02/2022    INFLUENZA 09/16/2020, 10/15/2021    Influenza, high dose seasonal 0.7 mL 10/02/2018, 11/04/2019    Influenza, seasonal, injectable 09/13/2010, 01/13/2017    Pneumococcal Conjugate 13-Valent 09/12/2017    Pneumococcal Polysaccharide PPV23 10/02/2018    Td (adult), adsorbed 05/04/2004    Tdap 06/30/2014      Health Maintenance:         Topic Date Due    Breast Cancer Screening: Mammogram  02/20/2025    Colorectal Cancer Screening  12/10/2025    Hepatitis C Screening  Completed         Topic Date Due    COVID-19 Vaccine (5 - Pfizer series) 03/02/2023    Influenza Vaccine (1) 09/01/2023      Medicare Screening Tests and Risk Assessments:     Tiffanie Menjivar is here for her Subsequent Wellness visit. Last Medicare Wellness visit information reviewed, patient interviewed and updates made to the record today. Health Risk Assessment:   Patient rates overall health as good. Patient feels that their physical health rating is same. Patient is very satisfied with their life. Eyesight was rated as same. Hearing was rated as same. Patient feels that their emotional and mental health rating is same. Patients states they are never, rarely angry. Patient states they are sometimes unusually tired/fatigued. Pain experienced in the last 7 days has been some. Patient's pain rating has been 3/10. Patient states that she has experienced weight loss or gain in last 6 months. Depression Screening:   PHQ-2 Score: 0      Fall Risk Screening: In the past year, patient has experienced: no history of falling in past year      Urinary Incontinence Screening:   Patient has not leaked urine accidently in the last six months.      Home Safety:  Patient does not have trouble with stairs inside or outside of their home. Patient has working smoke alarms and has working carbon monoxide detector. Home safety hazards include: none. Nutrition:   Current diet is Regular, Low Cholesterol and Limited junk food. Medications:   Patient is currently taking over-the-counter supplements. OTC medications include: daily vitamin. Patient is able to manage medications. Activities of Daily Living (ADLs)/Instrumental Activities of Daily Living (IADLs):   Walk and transfer into and out of bed and chair?: Yes  Dress and groom yourself?: Yes    Bathe or shower yourself?: Yes    Feed yourself? Yes  Do your laundry/housekeeping?: Yes  Manage your money, pay your bills and track your expenses?: Yes  Make your own meals?: Yes    Do your own shopping?: Yes    Previous Hospitalizations:   Any hospitalizations or ED visits within the last 12 months?: No      Advance Care Planning:   Living will: Yes    Durable POA for healthcare: Yes    Advanced directive: Yes      PREVENTIVE SCREENINGS      Cardiovascular Screening:    General: Screening Not Indicated and History Lipid Disorder      Colorectal Cancer Screening:     General: Screening Current      Breast Cancer Screening:     General: Screening Current      Cervical Cancer Screening:    General: Screening Not Indicated      Lung Cancer Screening:     General: Screening Not Indicated      Hepatitis C Screening:    General: Screening Current    Screening, Brief Intervention, and Referral to Treatment (SBIRT)    Screening  Typical number of drinks in a day: 0  Typical number of drinks in a week: 2  Interpretation: Low risk drinking behavior. Single Item Drug Screening:  How often have you used an illegal drug (including marijuana) or a prescription medication for non-medical reasons in the past year? never    Single Item Drug Screen Score: 0  Interpretation: Negative screen for possible drug use disorder    No results found.      Physical Exam:     There were no vitals taken for this visit.     Physical Exam     Shante Negron MD

## 2023-11-21 NOTE — PROGRESS NOTES
Chief Complaint   Patient presents with    Medicare Wellness Visit        HPI   Here for Medicare wellness exam and follow-up of hyperlipidemia and vitamin D deficiency. Doing okay. Continues on her atorvastatin. 5 mg daily. Along with Zetia. Had her flu shot and RSV vaccine. Plans on getting the COVID booster. Had shingles vaccine a couple years ago. Past Medical History:   Diagnosis Date    Basal cell adenocarcinoma 2011    11 or 12-sees dermatology every 6 months    Family history of colon cancer in father 11/17/2022    Kidney stone     Melanoma ([facility]) 2010    ear    Melanoma in situ ([facility]) 03/21/2018    leg    Nephrolithiasis     Shingles     Skin cancer, basal cell     right arm        Past Surgical History:   Procedure Laterality Date    FRACTURE SURGERY  11/89    rt ankle    TUBAL LIGATION  3/86       Social History     Tobacco Use    Smoking status: Never    Smokeless tobacco: Never   Substance Use Topics    Alcohol use: Yes     Alcohol/week: 3.0 standard drinks of alcohol     Types: 3 Glasses of wine per week     Comment: social       Social History     Social History Narrative     since 1973. 3 children. 10 grandchildren. 8 nearby. The other in [place]. Retired at age 66 when he 1st grandchild was born. . 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 11/21/23- 81 yo mother in the hospital. Lives at [facility]. The following portions of the patient's history were reviewed and updated as appropriate: allergies, current medications, past family history, past medical history, past social history, past surgical history, and problem list.      Review of Systems   Denies chest pain or shortness of breath. Appetite okay. Bowels okay. No urinary difficulties.     /80 (BP Location: Left arm, Patient Position: Sitting, Cuff Size: Large)   Pulse 77   Temp 97.6 °F (36.4 °C) (Temporal)   Resp 16   Ht 5' 4" (1.626 m)   Wt 87.1 kg (192 lb) SpO2 97%   BMI 32.96 kg/m²      Physical Exam   Appears well. No neck nodes or thyromegaly. Lungs are clear. Heart regular with no murmur. Abdomen soft and nontender. Mood is upbeat. Affect appropriate. Depression Screening and Follow-up Plan: Patient was screened for depression during today's encounter. They screened negative with a PHQ-2 score of 0. Current Outpatient Medications:     atorvastatin (LIPITOR) 10 mg tablet, TAKE ONE-HALF TABLET BY MOUTH DAILY, Disp: 120 tablet, Rfl: 3    Calcium-Magnesium-Vitamin D (CALCIUM MAGNESIUM PO), Take 1 tablet by mouth daily, Disp: , Rfl:     cholecalciferol (VITAMIN D3) 09127 units capsule, Take by mouth, Disp: , Rfl:     ezetimibe (ZETIA) 10 mg tablet, Take 1 tablet (10 mg total) by mouth daily, Disp: 90 tablet, Rfl: 3    meloxicam (MOBIC) 15 mg tablet, Take 1 tablet (15 mg total) by mouth daily, Disp: 30 tablet, Rfl: 3    Multiple Vitamin (MULTIVITAMINS PO), Take 1 tablet by mouth daily, Disp: , Rfl:      No problem-specific Assessment & Plan notes found for this encounter. Diagnoses and all orders for this visit:    Medicare annual wellness visit, subsequent    Mixed hyperlipidemia        Patient Instructions   Medicare wellness exam is completed. Looks great. Recheck lipid profile. Immunizations are up-to-date except for COVID booster which she will soon get. If able to tolerate 10 mg of atorvastatin, suggest doing that along with the Zetia. Hopefully mother will recover from her current hospitalization. Recheck in 1 year. Medicare Preventive Visit Patient Instructions  Thank you for completing your Welcome to Medicare Visit or Medicare Annual Wellness Visit today. Your next wellness visit will be due in one year (11/21/2024). The screening/preventive services that you may require over the next 5-10 years are detailed below. Some tests may not apply to you based off risk factors and/or age.  Screening tests ordered at today's visit but not completed yet may show as past due. Also, please note that scanned in results may not display below. Preventive Screenings:  Service Recommendations Previous Testing/Comments   Colorectal Cancer Screening  * Colonoscopy    * Fecal Occult Blood Test (FOBT)/Fecal Immunochemical Test (FIT)  * Fecal DNA/Cologuard Test  * Flexible Sigmoidoscopy Age: 43-73 years old   Colonoscopy: every 10 years (may be performed more frequently if at higher risk)  OR  FOBT/FIT: every 1 year  OR  Cologuard: every 3 years  OR  Sigmoidoscopy: every 5 years  Screening may be recommended earlier than age 39 if at higher risk for colorectal cancer. Also, an individualized decision between you and your healthcare provider will decide whether screening between the ages of 77-80 would be appropriate. Colonoscopy: 12/10/2020  FOBT/FIT: Not on file  Cologuard: Not on file  Sigmoidoscopy: Not on file    Screening Current     Breast Cancer Screening Age: 36 years old  Frequency: every 1-2 years  Not required if history of left and right mastectomy Mammogram: 02/20/2023    Screening Current   Cervical Cancer Screening Between the ages of 21-29, pap smear recommended once every 3 years. Between the ages of 32-69, can perform pap smear with HPV co-testing every 5 years. Recommendations may differ for women with a history of total hysterectomy, cervical cancer, or abnormal pap smears in past. Pap Smear: Not on file    Screening Not Indicated   Hepatitis C Screening Once for adults born between 1945 and 1965  More frequently in patients at high risk for Hepatitis C Hep C Antibody: 10/07/2019    Screening Current   Diabetes Screening 1-2 times per year if you're at risk for diabetes or have pre-diabetes Fasting glucose: 90 mg/dL (11/10/2021)  A1C: No results in last 5 years (No results in last 5 years)      Cholesterol Screening Once every 5 years if you don't have a lipid disorder. May order more often based on risk factors.  Lipid panel: 11/17/2022    Screening Not Indicated  History Lipid Disorder     Other Preventive Screenings Covered by Medicare:  Abdominal Aortic Aneurysm (AAA) Screening: covered once if your at risk. You're considered to be at risk if you have a family history of AAA. Lung Cancer Screening: covers low dose CT scan once per year if you meet all of the following conditions: (1) Age 48-67; (2) No signs or symptoms of lung cancer; (3) Current smoker or have quit smoking within the last 15 years; (4) You have a tobacco smoking history of at least 20 pack years (packs per day multiplied by number of years you smoked); (5) You get a written order from a healthcare provider. Glaucoma Screening: covered annually if you're considered high risk: (1) You have diabetes OR (2) Family history of glaucoma OR (3)  aged 48 and older OR (3)  American aged 72 and older  Osteoporosis Screening: covered every 2 years if you meet one of the following conditions: (1) You're estrogen deficient and at risk for osteoporosis based off medical history and other findings; (2) Have a vertebral abnormality; (3) On glucocorticoid therapy for more than 3 months; (4) Have primary hyperparathyroidism; (5) On osteoporosis medications and need to assess response to drug therapy. Last bone density test (DXA Scan): 02/16/2022. HIV Screening: covered annually if you're between the age of 14-79. Also covered annually if you are younger than 13 and older than 72 with risk factors for HIV infection. For pregnant patients, it is covered up to 3 times per pregnancy.     Immunizations:  Immunization Recommendations   Influenza Vaccine Annual influenza vaccination during flu season is recommended for all persons aged >= 6 months who do not have contraindications   Pneumococcal Vaccine   * Pneumococcal conjugate vaccine = PCV13 (Prevnar 13), PCV15 (Vaxneuvance), PCV20 (Prevnar 20)  * Pneumococcal polysaccharide vaccine = PPSV23 (Pneumovax) Adults 27-94 yo with certain risk factors or if 69+ yo  If never received any pneumonia vaccine: recommend Prevnar 21 (PCV20)  Give PCV20 if previously received 1 dose of PCV13 or PPSV23   Hepatitis B Vaccine 3 dose series if at intermediate or high risk (ex: diabetes, end stage renal disease, liver disease)   Respiratory syncytial virus (RSV) Vaccine - COVERED BY MEDICARE PART D  * RSVPreF3 (Arexvy) CDC recommends that adults 61years of age and older may receive a single dose of RSV vaccine using shared clinical decision-making (SCDM)   Tetanus (Td) Vaccine - COST NOT COVERED BY MEDICARE PART B Following completion of primary series, a booster dose should be given every 10 years to maintain immunity against tetanus. Td may also be given as tetanus wound prophylaxis. Tdap Vaccine - COST NOT COVERED BY MEDICARE PART B Recommended at least once for all adults. For pregnant patients, recommended with each pregnancy. Shingles Vaccine (Shingrix) - COST NOT COVERED BY MEDICARE PART B  2 shot series recommended in those 19 years and older who have or will have weakened immune systems or those 50 years and older     Health Maintenance Due:      Topic Date Due    Breast Cancer Screening: Mammogram  02/20/2025    Colorectal Cancer Screening  12/10/2025    Hepatitis C Screening  Completed     Immunizations Due:      Topic Date Due    COVID-19 Vaccine (5 - Pfizer series) 03/02/2023    Influenza Vaccine (1) 09/01/2023     Advance Directives   What are advance directives? Advance directives are legal documents that state your wishes and plans for medical care. These plans are made ahead of time in case you lose your ability to make decisions for yourself. Advance directives can apply to any medical decision, such as the treatments you want, and if you want to donate organs. What are the types of advance directives? There are many types of advance directives, and each state has rules about how to use them.  You may choose a combination of any of the following:  Living will: This is a written record of the treatment you want. You can also choose which treatments you do not want, which to limit, and which to stop at a certain time. This includes surgery, medicine, IV fluid, and tube feedings. Durable power of  for healthcare [facility]): This is a written record that states who you want to make healthcare choices for you when you are unable to make them for yourself. This person, called a proxy, is usually a family member or a friend. You may choose more than 1 proxy. Do not resuscitate (DNR) order:  A DNR order is used in case your heart stops beating or you stop breathing. It is a request not to have certain forms of treatment, such as CPR. A DNR order may be included in other types of advance directives. Medical directive: This covers the care that you want if you are in a coma, near death, or unable to make decisions for yourself. You can list the treatments you want for each condition. Treatment may include pain medicine, surgery, blood transfusions, dialysis, IV or tube feedings, and a ventilator (breathing machine). Values history: This document has questions about your views, beliefs, and how you feel and think about life. This information can help others choose the care that you would choose. Why are advance directives important? An advance directive helps you control your care. Although spoken wishes may be used, it is better to have your wishes written down. Spoken wishes can be misunderstood, or not followed. Treatments may be given even if you do not want them. An advance directive may make it easier for your family to make difficult choices about your care. Weight Management   Why it is important to manage your weight:  Being overweight increases your risk of health conditions such as heart disease, high blood pressure, type 2 diabetes, and certain types of cancer.  It can also increase your risk for osteoarthritis, sleep apnea, and other respiratory problems. Aim for a slow, steady weight loss. Even a small amount of weight loss can lower your risk of health problems. How to lose weight safely:  A safe and healthy way to lose weight is to eat fewer calories and get regular exercise. You can lose up about 1 pound a week by decreasing the number of calories you eat by 500 calories each day. Healthy meal plan for weight management:  A healthy meal plan includes a variety of foods, contains fewer calories, and helps you stay healthy. A healthy meal plan includes the following:  Eat whole-grain foods more often. A healthy meal plan should contain fiber. Fiber is the part of grains, fruits, and vegetables that is not broken down by your body. Whole-grain foods are healthy and provide extra fiber in your diet. Some examples of whole-grain foods are whole-wheat breads and pastas, oatmeal, brown rice, and bulgur. Eat a variety of vegetables every day. Include dark, leafy greens such as spinach, kale, mauri greens, and mustard greens. Eat yellow and orange vegetables such as carrots, sweet potatoes, and winter squash. Eat a variety of fruits every day. Choose fresh or canned fruit (canned in its own juice or light syrup) instead of juice. Fruit juice has very little or no fiber. Eat low-fat dairy foods. Drink fat-free (skim) milk or 1% milk. Eat fat-free yogurt and low-fat cottage cheese. Try low-fat cheeses such as mozzarella and other reduced-fat cheeses. Choose meat and other protein foods that are low in fat. Choose beans or other legumes such as split peas or lentils. Choose fish, skinless poultry (chicken or turkey), or lean cuts of red meat (beef or pork). Before you cook meat or poultry, cut off any visible fat. Use less fat and oil. Try baking foods instead of frying them. Add less fat, such as margarine, sour cream, regular salad dressing and mayonnaise to foods. Eat fewer high-fat foods.  Some examples of high-fat foods include french fries, doughnuts, ice cream, and cakes. Eat fewer sweets. Limit foods and drinks that are high in sugar. This includes candy, cookies, regular soda, and sweetened drinks. Exercise:  Exercise at least 30 minutes per day on most days of the week. Some examples of exercise include walking, biking, dancing, and swimming. You can also fit in more physical activity by taking the stairs instead of the elevator or parking farther away from stores. Ask your healthcare provider about the best exercise plan for you. © Copyright [employer] 2018 Information is for End User's use only and may not be sold, redistributed or otherwise used for commercial purposes.  All illustrations and images included in CareNotes® are the copyrighted property of [employer].D.A[employer] Inc[employer] or [address] [place] [address]

## 2023-11-22 ENCOUNTER — LAB (OUTPATIENT)
Dept: LAB | Facility: CLINIC | Age: 73
End: 2023-11-22
Payer: MEDICARE

## 2023-11-22 DIAGNOSIS — E78.2 MIXED HYPERLIPIDEMIA: ICD-10-CM

## 2023-11-22 LAB
CHOLEST SERPL-MCNC: 261 MG/DL
HDLC SERPL-MCNC: 60 MG/DL
LDLC SERPL CALC-MCNC: 182 MG/DL (ref 0–100)
NONHDLC SERPL-MCNC: 201 MG/DL
TRIGL SERPL-MCNC: 93 MG/DL

## 2023-11-22 PROCEDURE — 36415 COLL VENOUS BLD VENIPUNCTURE: CPT

## 2023-11-22 PROCEDURE — 80061 LIPID PANEL: CPT

## 2024-02-05 ENCOUNTER — TELEPHONE (OUTPATIENT)
Dept: FAMILY MEDICINE CLINIC | Facility: CLINIC | Age: 74
End: 2024-02-05

## 2024-02-05 DIAGNOSIS — E78.2 MIXED HYPERLIPIDEMIA: ICD-10-CM

## 2024-02-05 RX ORDER — ATORVASTATIN CALCIUM 10 MG/1
10 TABLET, FILM COATED ORAL DAILY
Qty: 90 TABLET | Refills: 3 | Status: SHIPPED | OUTPATIENT
Start: 2024-02-05

## 2024-02-05 NOTE — TELEPHONE ENCOUNTER
Called and spoke with patient.   Patient is aware.   
Patient called, wanted to know what dose of atorvastatin she should be taking, stated that when she spoke to [facility] the script said to take a 1/2 pill, patient said she was supposed to take a whole pill, patient stated if it is supposed to be 1 pill a day, requesting a new script be sent into [facility] in [place]  
Prescription amended to 1 tablet daily and new prescription sent to [facility].  
PACU

## 2024-02-25 DIAGNOSIS — E78.2 MIXED HYPERLIPIDEMIA: ICD-10-CM

## 2024-02-26 RX ORDER — EZETIMIBE 10 MG/1
10 TABLET ORAL DAILY
Qty: 90 TABLET | Refills: 3 | Status: SHIPPED | OUTPATIENT
Start: 2024-02-26

## 2024-03-13 PROCEDURE — 88305 TISSUE EXAM BY PATHOLOGIST: CPT | Performed by: STUDENT IN AN ORGANIZED HEALTH CARE EDUCATION/TRAINING PROGRAM

## 2024-03-13 PROCEDURE — 88342 IMHCHEM/IMCYTCHM 1ST ANTB: CPT | Performed by: STUDENT IN AN ORGANIZED HEALTH CARE EDUCATION/TRAINING PROGRAM

## 2024-03-14 ENCOUNTER — LAB REQUISITION (OUTPATIENT)
Dept: LAB | Facility: HOSPITAL | Age: 74
End: 2024-03-14
Payer: MEDICARE

## 2024-03-14 DIAGNOSIS — D48.5 NEOPLASM OF UNCERTAIN BEHAVIOR OF SKIN: ICD-10-CM

## 2024-03-18 PROCEDURE — 88305 TISSUE EXAM BY PATHOLOGIST: CPT | Performed by: STUDENT IN AN ORGANIZED HEALTH CARE EDUCATION/TRAINING PROGRAM

## 2024-03-18 PROCEDURE — 88342 IMHCHEM/IMCYTCHM 1ST ANTB: CPT | Performed by: STUDENT IN AN ORGANIZED HEALTH CARE EDUCATION/TRAINING PROGRAM

## 2024-11-26 ENCOUNTER — OFFICE VISIT (OUTPATIENT)
Dept: FAMILY MEDICINE CLINIC | Facility: CLINIC | Age: 74
End: 2024-11-26
Payer: MEDICARE

## 2024-11-26 VITALS
HEART RATE: 70 BPM | SYSTOLIC BLOOD PRESSURE: 124 MMHG | RESPIRATION RATE: 16 BRPM | HEIGHT: 64 IN | OXYGEN SATURATION: 97 % | BODY MASS INDEX: 33.12 KG/M2 | TEMPERATURE: 97.8 F | DIASTOLIC BLOOD PRESSURE: 78 MMHG | WEIGHT: 194 LBS

## 2024-11-26 DIAGNOSIS — M54.42 LOW BACK PAIN WITH LEFT-SIDED SCIATICA, UNSPECIFIED BACK PAIN LATERALITY, UNSPECIFIED CHRONICITY: ICD-10-CM

## 2024-11-26 DIAGNOSIS — E78.2 MIXED HYPERLIPIDEMIA: ICD-10-CM

## 2024-11-26 DIAGNOSIS — Z78.0 ASYMPTOMATIC POSTMENOPAUSAL STATE: ICD-10-CM

## 2024-11-26 DIAGNOSIS — Z00.00 MEDICARE ANNUAL WELLNESS VISIT, SUBSEQUENT: Primary | ICD-10-CM

## 2024-11-26 PROCEDURE — 99213 OFFICE O/P EST LOW 20 MIN: CPT | Performed by: FAMILY MEDICINE

## 2024-11-26 PROCEDURE — G0439 PPPS, SUBSEQ VISIT: HCPCS | Performed by: FAMILY MEDICINE

## 2024-11-26 RX ORDER — EZETIMIBE 10 MG/1
10 TABLET ORAL DAILY
Qty: 90 TABLET | Refills: 3 | Status: SHIPPED | OUTPATIENT
Start: 2024-11-26

## 2024-11-26 RX ORDER — ATORVASTATIN CALCIUM 10 MG/1
10 TABLET, FILM COATED ORAL DAILY
Qty: 90 TABLET | Refills: 3 | Status: SHIPPED | OUTPATIENT
Start: 2024-11-26

## 2024-11-26 RX ORDER — MELOXICAM 15 MG/1
15 TABLET ORAL DAILY
Qty: 30 TABLET | Refills: 3 | Status: SHIPPED | OUTPATIENT
Start: 2024-11-26

## 2024-11-26 NOTE — PROGRESS NOTES
"Chief Complaint   Patient presents with    Medicare Wellness Visit        HPI   Here for Medicare wellness exam and follow-up of hyperlipidemia.  Notes that she has been off her cholesterol medications for a couple weeks because of achy upper arms.  Restarted it a few days ago.  Notes that parents lived to be 88 and 92 despite their cholesterol.  Uses meloxicam rarely when she gets pain in the back that can radiate down the left leg.  Mother  last year.     Past Medical History:   Diagnosis Date    Basal cell adenocarcinoma     9 or 12-sees dermatology every 6 months    Family history of colon cancer in father 2022    Kidney stone     Melanoma (HCC)     ear    Melanoma in situ (HCC) 2018    leg    Nephrolithiasis     Shingles     Skin cancer, basal cell     right arm        Past Surgical History:   Procedure Laterality Date    FRACTURE SURGERY      rt ankle    TUBAL LIGATION  3/86       Social History     Tobacco Use    Smoking status: Never    Smokeless tobacco: Never   Substance Use Topics    Alcohol use: Yes     Alcohol/week: 3.0 standard drinks of alcohol     Types: 3 Glasses of wine per week     Comment: social       Social History     Social History Narrative     since . 2 years older. 3 children. 10 grandchildren. 8 nearby. The other in [place].    Retired at age 58 when he 1st grandchild was born. . 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       worked at [employer].         The following portions of the patient's history were reviewed and updated as appropriate: allergies, current medications, past family history, past medical history, past social history, past surgical history, and problem list.      Review of Systems       /78 (BP Location: Left arm, Patient Position: Sitting, Cuff Size: Large)   Pulse 70   Temp 97.8 °F (36.6 °C) (Temporal)   Resp 16   Ht 5' 4\" (1.626 m)   Wt 88 kg (194 lb)   SpO2 97%   BMI 33.30 kg/m²      Physical Exam   Appears " well.  Lungs are clear.  Heart regular with no murmur.  Abdomen is soft.  No edema.  Mood is upbeat.  Affect appropriate.              Current Outpatient Medications:     atorvastatin (LIPITOR) 10 mg tablet, Take 1 tablet (10 mg total) by mouth daily, Disp: 90 tablet, Rfl: 3    Calcium-Magnesium-Vitamin D (CALCIUM MAGNESIUM PO), Take 1 tablet by mouth daily, Disp: , Rfl:     cholecalciferol (VITAMIN D3) 02851 units capsule, Take by mouth, Disp: , Rfl:     ezetimibe (ZETIA) 10 mg tablet, Take 1 tablet (10 mg total) by mouth daily, Disp: 90 tablet, Rfl: 3    meloxicam (MOBIC) 15 mg tablet, Take 1 tablet (15 mg total) by mouth daily, Disp: 30 tablet, Rfl: 3    Multiple Vitamin (MULTIVITAMINS PO), Take 1 tablet by mouth daily, Disp: , Rfl:      No problem-specific Assessment & Plan notes found for this encounter.       Diagnoses and all orders for this visit:    Medicare annual wellness visit, subsequent    Mixed hyperlipidemia  -     atorvastatin (LIPITOR) 10 mg tablet; Take 1 tablet (10 mg total) by mouth daily  -     ezetimibe (ZETIA) 10 mg tablet; Take 1 tablet (10 mg total) by mouth daily  -     Lipid panel; Future    Asymptomatic postmenopausal state  -     DXA bone density spine hip and pelvis; Future    Low back pain with left-sided sciatica, unspecified back pain laterality, unspecified chronicity  -     meloxicam (MOBIC) 15 mg tablet; Take 1 tablet (15 mg total) by mouth daily        Patient Instructions   Medicare wellness exam is completed.  Repeat bone density exam.  Discussion of her cholesterol medications which she has recently restarted.  Recheck lipid profile in early January.  For aches and pains, okay to take 2 extra strength Tylenol 3 times a day and meloxicam once daily as needed.  Immunizations are up-to-date.  Recheck 1 year or as needed.    Medicare Preventive Visit Patient Instructions  Thank you for completing your Welcome to Medicare Visit or Medicare Annual Wellness Visit today. Your next  wellness visit will be due in one year (11/27/2025).  The screening/preventive services that you may require over the next 5-10 years are detailed below. Some tests may not apply to you based off risk factors and/or age. Screening tests ordered at today's visit but not completed yet may show as past due. Also, please note that scanned in results may not display below.  Preventive Screenings:  Service Recommendations Previous Testing/Comments   Colorectal Cancer Screening  * Colonoscopy    * Fecal Occult Blood Test (FOBT)/Fecal Immunochemical Test (FIT)  * Fecal DNA/Cologuard Test  * Flexible Sigmoidoscopy Age: 45-75 years old   Colonoscopy: every 10 years (may be performed more frequently if at higher risk)  OR  FOBT/FIT: every 1 year  OR  Cologuard: every 3 years  OR  Sigmoidoscopy: every 5 years  Screening may be recommended earlier than age 45 if at higher risk for colorectal cancer. Also, an individualized decision between you and your healthcare provider will decide whether screening between the ages of 76-85 would be appropriate. Colonoscopy: 12/10/2020  FOBT/FIT: Not on file  Cologuard: Not on file  Sigmoidoscopy: Not on file    Screening Current     Breast Cancer Screening Age: 40+ years old  Frequency: every 1-2 years  Not required if history of left and right mastectomy Mammogram: 03/05/2024    Screening Current   Cervical Cancer Screening Between the ages of 21-29, pap smear recommended once every 3 years.   Between the ages of 30-65, can perform pap smear with HPV co-testing every 5 years.   Recommendations may differ for women with a history of total hysterectomy, cervical cancer, or abnormal pap smears in past. Pap Smear: Not on file    Screening Not Indicated   Hepatitis C Screening Once for adults born between 1945 and 1965  More frequently in patients at high risk for Hepatitis C Hep C Antibody: 10/07/2019    Screening Current   Diabetes Screening 1-2 times per year if you're at risk for diabetes or  have pre-diabetes Fasting glucose: 90 mg/dL (11/10/2021)  A1C: No results in last 5 years (No results in last 5 years)      Cholesterol Screening Once every 5 years if you don't have a lipid disorder. May order more often based on risk factors. Lipid panel: 11/22/2023    Screening Not Indicated  History Lipid Disorder     Other Preventive Screenings Covered by Medicare:  Abdominal Aortic Aneurysm (AAA) Screening: covered once if your at risk. You're considered to be at risk if you have a family history of AAA.  Lung Cancer Screening: covers low dose CT scan once per year if you meet all of the following conditions: (1) Age 55-77; (2) No signs or symptoms of lung cancer; (3) Current smoker or have quit smoking within the last 15 years; (4) You have a tobacco smoking history of at least 20 pack years (packs per day multiplied by number of years you smoked); (5) You get a written order from a healthcare provider.  Glaucoma Screening: covered annually if you're considered high risk: (1) You have diabetes OR (2) Family history of glaucoma OR (3)  aged 50 and older OR (4)  American aged 65 and older  Osteoporosis Screening: covered every 2 years if you meet one of the following conditions: (1) You're estrogen deficient and at risk for osteoporosis based off medical history and other findings; (2) Have a vertebral abnormality; (3) On glucocorticoid therapy for more than 3 months; (4) Have primary hyperparathyroidism; (5) On osteoporosis medications and need to assess response to drug therapy.   Last bone density test (DXA Scan): 02/16/2022.  HIV Screening: covered annually if you're between the age of 15-65. Also covered annually if you are younger than 15 and older than 65 with risk factors for HIV infection. For pregnant patients, it is covered up to 3 times per pregnancy.    Immunizations:  Immunization Recommendations   Influenza Vaccine Annual influenza vaccination during flu season is  recommended for all persons aged >= 6 months who do not have contraindications   Pneumococcal Vaccine   * Pneumococcal conjugate vaccine = PCV13 (Prevnar 13), PCV15 (Vaxneuvance), PCV20 (Prevnar 20)  * Pneumococcal polysaccharide vaccine = PPSV23 (Pneumovax) Adults 19-65 yo with certain risk factors or if 65+ yo  If never received any pneumonia vaccine: recommend Prevnar 20 (PCV20)  Give PCV20 if previously received 1 dose of PCV13 or PPSV23   Hepatitis B Vaccine 3 dose series if at intermediate or high risk (ex: diabetes, end stage renal disease, liver disease)   Respiratory syncytial virus (RSV) Vaccine - COVERED BY MEDICARE PART D  * RSVPreF3 (Arexvy) CDC recommends that adults 60 years of age and older may receive a single dose of RSV vaccine using shared clinical decision-making (SCDM)   Tetanus (Td) Vaccine - COST NOT COVERED BY MEDICARE PART B Following completion of primary series, a booster dose should be given every 10 years to maintain immunity against tetanus. Td may also be given as tetanus wound prophylaxis.   Tdap Vaccine - COST NOT COVERED BY MEDICARE PART B Recommended at least once for all adults. For pregnant patients, recommended with each pregnancy.   Shingles Vaccine (Shingrix) - COST NOT COVERED BY MEDICARE PART B  2 shot series recommended in those 19 years and older who have or will have weakened immune systems or those 50 years and older     Health Maintenance Due:      Topic Date Due    Colorectal Cancer Screening  12/10/2025    Breast Cancer Screening: Mammogram  03/05/2026    Hepatitis C Screening  Completed     Immunizations Due:  There are no preventive care reminders to display for this patient.  Advance Directives   What are advance directives?  Advance directives are legal documents that state your wishes and plans for medical care. These plans are made ahead of time in case you lose your ability to make decisions for yourself. Advance directives can apply to any medical decision,  such as the treatments you want, and if you want to donate organs.   What are the types of advance directives?  There are many types of advance directives, and each state has rules about how to use them. You may choose a combination of any of the following:  Living will:  This is a written record of the treatment you want. You can also choose which treatments you do not want, which to limit, and which to stop at a certain time. This includes surgery, medicine, IV fluid, and tube feedings.   Durable power of  for healthcare (DPAHC):  This is a written record that states who you want to make healthcare choices for you when you are unable to make them for yourself. This person, called a proxy, is usually a family member or a friend. You may choose more than 1 proxy.  Do not resuscitate (DNR) order:  A DNR order is used in case your heart stops beating or you stop breathing. It is a request not to have certain forms of treatment, such as CPR. A DNR order may be included in other types of advance directives.  Medical directive:  This covers the care that you want if you are in a coma, near death, or unable to make decisions for yourself. You can list the treatments you want for each condition. Treatment may include pain medicine, surgery, blood transfusions, dialysis, IV or tube feedings, and a ventilator (breathing machine).  Values history:  This document has questions about your views, beliefs, and how you feel and think about life. This information can help others choose the care that you would choose.  Why are advance directives important?  An advance directive helps you control your care. Although spoken wishes may be used, it is better to have your wishes written down. Spoken wishes can be misunderstood, or not followed. Treatments may be given even if you do not want them. An advance directive may make it easier for your family to make difficult choices about your care.   Urinary Incontinence   Urinary  incontinence (UI)  is when you lose control of your bladder. UI develops because your bladder cannot store or empty urine properly. The 3 most common types of UI are stress incontinence, urge incontinence, or both.  Medicines:   May be given to help strengthen your bladder control. Report any side effects of medication to your healthcare provider.  Do pelvic muscle exercises often:  Your pelvic muscles help you stop urinating. Squeeze these muscles tight for 5 seconds, then relax for 5 seconds. Gradually work up to squeezing for 10 seconds. Do 3 sets of 15 repetitions a day, or as directed. This will help strengthen your pelvic muscles and improve bladder control.  Train your bladder:  Go to the bathroom at set times, such as every 2 hours, even if you do not feel the urge to go. You can also try to hold your urine when you feel the urge to go. For example, hold your urine for 5 minutes when you feel the urge to go. As that becomes easier, hold your urine for 10 minutes.   Self-care:   Keep a UI record.  Write down how often you leak urine and how much you leak. Make a note of what you were doing when you leaked urine.  Drink liquids as directed. You may need to limit the amount of liquid you drink to help control your urine leakage. Do not drink any liquid right before you go to bed. Limit or do not have drinks that contain caffeine or alcohol.   Prevent constipation.  Eat a variety of high-fiber foods. Good examples are high-fiber cereals, beans, vegetables, and whole-grain breads. Walking is the best way to trigger your intestines to have a bowel movement.  Exercise regularly and maintain a healthy weight.  Weight loss and exercise will decrease pressure on your bladder and help you control your leakage.   Use a catheter as directed  to help empty your bladder. A catheter is a tiny, plastic tube that is put into your bladder to drain your urine.   Go to behavior therapy as directed.  Behavior therapy may be used  to help you learn to control your urge to urinate.    Weight Management   Why it is important to manage your weight:  Being overweight increases your risk of health conditions such as heart disease, high blood pressure, type 2 diabetes, and certain types of cancer. It can also increase your risk for osteoarthritis, sleep apnea, and other respiratory problems. Aim for a slow, steady weight loss. Even a small amount of weight loss can lower your risk of health problems.  How to lose weight safely:  A safe and healthy way to lose weight is to eat fewer calories and get regular exercise. You can lose up about 1 pound a week by decreasing the number of calories you eat by 500 calories each day.   Healthy meal plan for weight management:  A healthy meal plan includes a variety of foods, contains fewer calories, and helps you stay healthy. A healthy meal plan includes the following:  Eat whole-grain foods more often.  A healthy meal plan should contain fiber. Fiber is the part of grains, fruits, and vegetables that is not broken down by your body. Whole-grain foods are healthy and provide extra fiber in your diet. Some examples of whole-grain foods are whole-wheat breads and pastas, oatmeal, brown rice, and bulgur.  Eat a variety of vegetables every day.  Include dark, leafy greens such as spinach, kale, mauri greens, and mustard greens. Eat yellow and orange vegetables such as carrots, sweet potatoes, and winter squash.   Eat a variety of fruits every day.  Choose fresh or canned fruit (canned in its own juice or light syrup) instead of juice. Fruit juice has very little or no fiber.  Eat low-fat dairy foods.  Drink fat-free (skim) milk or 1% milk. Eat fat-free yogurt and low-fat cottage cheese. Try low-fat cheeses such as mozzarella and other reduced-fat cheeses.  Choose meat and other protein foods that are low in fat.  Choose beans or other legumes such as split peas or lentils. Choose fish, skinless poultry (chicken  or turkey), or lean cuts of red meat (beef or pork). Before you cook meat or poultry, cut off any visible fat.   Use less fat and oil.  Try baking foods instead of frying them. Add less fat, such as margarine, sour cream, regular salad dressing and mayonnaise to foods. Eat fewer high-fat foods. Some examples of high-fat foods include french fries, doughnuts, ice cream, and cakes.  Eat fewer sweets.  Limit foods and drinks that are high in sugar. This includes candy, cookies, regular soda, and sweetened drinks.  Exercise:  Exercise at least 30 minutes per day on most days of the week. Some examples of exercise include walking, biking, dancing, and swimming. You can also fit in more physical activity by taking the stairs instead of the elevator or parking farther away from stores. Ask your healthcare provider about the best exercise plan for you.      © Copyright [employer] 2018 Information is for End User's use only and may not be sold, redistributed or otherwise used for commercial purposes. All illustrations and images included in CareNotes® are the copyrighted property of [employer] or [employer]      Medicare Preventive Visit Patient Instructions  Thank you for completing your Welcome to Medicare Visit or Medicare Annual Wellness Visit today. Your next wellness visit will be due in one year (11/27/2025).  The screening/preventive services that you may require over the next 5-10 years are detailed below. Some tests may not apply to you based off risk factors and/or age. Screening tests ordered at today's visit but not completed yet may show as past due. Also, please note that scanned in results may not display below.  Preventive Screenings:  Service Recommendations Previous Testing/Comments   Colorectal Cancer Screening  * Colonoscopy    * Fecal Occult Blood Test (FOBT)/Fecal Immunochemical Test (FIT)  * Fecal DNA/Cologuard Test  * Flexible Sigmoidoscopy Age: 45-75 years old   Colonoscopy: every 10  years (may be performed more frequently if at higher risk)  OR  FOBT/FIT: every 1 year  OR  Cologuard: every 3 years  OR  Sigmoidoscopy: every 5 years  Screening may be recommended earlier than age 45 if at higher risk for colorectal cancer. Also, an individualized decision between you and your healthcare provider will decide whether screening between the ages of 76-85 would be appropriate. Colonoscopy: 12/10/2020  FOBT/FIT: Not on file  Cologuard: Not on file  Sigmoidoscopy: Not on file    Screening Current     Breast Cancer Screening Age: 40+ years old  Frequency: every 1-2 years  Not required if history of left and right mastectomy Mammogram: 03/05/2024    Screening Current   Cervical Cancer Screening Between the ages of 21-29, pap smear recommended once every 3 years.   Between the ages of 30-65, can perform pap smear with HPV co-testing every 5 years.   Recommendations may differ for women with a history of total hysterectomy, cervical cancer, or abnormal pap smears in past. Pap Smear: Not on file    Screening Not Indicated   Hepatitis C Screening Once for adults born between 1945 and 1965  More frequently in patients at high risk for Hepatitis C Hep C Antibody: 10/07/2019    Screening Current   Diabetes Screening 1-2 times per year if you're at risk for diabetes or have pre-diabetes Fasting glucose: 90 mg/dL (11/10/2021)  A1C: No results in last 5 years (No results in last 5 years)      Cholesterol Screening Once every 5 years if you don't have a lipid disorder. May order more often based on risk factors. Lipid panel: 11/22/2023    Screening Not Indicated  History Lipid Disorder     Other Preventive Screenings Covered by Medicare:  Abdominal Aortic Aneurysm (AAA) Screening: covered once if your at risk. You're considered to be at risk if you have a family history of AAA.  Lung Cancer Screening: covers low dose CT scan once per year if you meet all of the following conditions: (1) Age 55-77; (2) No signs or  symptoms of lung cancer; (3) Current smoker or have quit smoking within the last 15 years; (4) You have a tobacco smoking history of at least 20 pack years (packs per day multiplied by number of years you smoked); (5) You get a written order from a healthcare provider.  Glaucoma Screening: covered annually if you're considered high risk: (1) You have diabetes OR (2) Family history of glaucoma OR (3)  aged 50 and older OR (4)  American aged 65 and older  Osteoporosis Screening: covered every 2 years if you meet one of the following conditions: (1) You're estrogen deficient and at risk for osteoporosis based off medical history and other findings; (2) Have a vertebral abnormality; (3) On glucocorticoid therapy for more than 3 months; (4) Have primary hyperparathyroidism; (5) On osteoporosis medications and need to assess response to drug therapy.   Last bone density test (DXA Scan): 02/16/2022.  HIV Screening: covered annually if you're between the age of 15-65. Also covered annually if you are younger than 15 and older than 65 with risk factors for HIV infection. For pregnant patients, it is covered up to 3 times per pregnancy.    Immunizations:  Immunization Recommendations   Influenza Vaccine Annual influenza vaccination during flu season is recommended for all persons aged >= 6 months who do not have contraindications   Pneumococcal Vaccine   * Pneumococcal conjugate vaccine = PCV13 (Prevnar 13), PCV15 (Vaxneuvance), PCV20 (Prevnar 20)  * Pneumococcal polysaccharide vaccine = PPSV23 (Pneumovax) Adults 19-63 yo with certain risk factors or if 65+ yo  If never received any pneumonia vaccine: recommend Prevnar 20 (PCV20)  Give PCV20 if previously received 1 dose of PCV13 or PPSV23   Hepatitis B Vaccine 3 dose series if at intermediate or high risk (ex: diabetes, end stage renal disease, liver disease)   Respiratory syncytial virus (RSV) Vaccine - COVERED BY MEDICARE PART D  * RSVPreF3 (Arexvy)  CDC recommends that adults 60 years of age and older may receive a single dose of RSV vaccine using shared clinical decision-making (SCDM)   Tetanus (Td) Vaccine - COST NOT COVERED BY MEDICARE PART B Following completion of primary series, a booster dose should be given every 10 years to maintain immunity against tetanus. Td may also be given as tetanus wound prophylaxis.   Tdap Vaccine - COST NOT COVERED BY MEDICARE PART B Recommended at least once for all adults. For pregnant patients, recommended with each pregnancy.   Shingles Vaccine (Shingrix) - COST NOT COVERED BY MEDICARE PART B  2 shot series recommended in those 19 years and older who have or will have weakened immune systems or those 50 years and older     Health Maintenance Due:      Topic Date Due    Colorectal Cancer Screening  12/10/2025    Breast Cancer Screening: Mammogram  03/05/2026    Hepatitis C Screening  Completed     Immunizations Due:  There are no preventive care reminders to display for this patient.  Advance Directives   What are advance directives?  Advance directives are legal documents that state your wishes and plans for medical care. These plans are made ahead of time in case you lose your ability to make decisions for yourself. Advance directives can apply to any medical decision, such as the treatments you want, and if you want to donate organs.   What are the types of advance directives?  There are many types of advance directives, and each state has rules about how to use them. You may choose a combination of any of the following:  Living will:  This is a written record of the treatment you want. You can also choose which treatments you do not want, which to limit, and which to stop at a certain time. This includes surgery, medicine, IV fluid, and tube feedings.   Durable power of  for healthcare (DPAHC):  This is a written record that states who you want to make healthcare choices for you when you are unable to make them  for yourself. This person, called a proxy, is usually a family member or a friend. You may choose more than 1 proxy.  Do not resuscitate (DNR) order:  A DNR order is used in case your heart stops beating or you stop breathing. It is a request not to have certain forms of treatment, such as CPR. A DNR order may be included in other types of advance directives.  Medical directive:  This covers the care that you want if you are in a coma, near death, or unable to make decisions for yourself. You can list the treatments you want for each condition. Treatment may include pain medicine, surgery, blood transfusions, dialysis, IV or tube feedings, and a ventilator (breathing machine).  Values history:  This document has questions about your views, beliefs, and how you feel and think about life. This information can help others choose the care that you would choose.  Why are advance directives important?  An advance directive helps you control your care. Although spoken wishes may be used, it is better to have your wishes written down. Spoken wishes can be misunderstood, or not followed. Treatments may be given even if you do not want them. An advance directive may make it easier for your family to make difficult choices about your care.   Urinary Incontinence   Urinary incontinence (UI)  is when you lose control of your bladder. UI develops because your bladder cannot store or empty urine properly. The 3 most common types of UI are stress incontinence, urge incontinence, or both.  Medicines:   May be given to help strengthen your bladder control. Report any side effects of medication to your healthcare provider.  Do pelvic muscle exercises often:  Your pelvic muscles help you stop urinating. Squeeze these muscles tight for 5 seconds, then relax for 5 seconds. Gradually work up to squeezing for 10 seconds. Do 3 sets of 15 repetitions a day, or as directed. This will help strengthen your pelvic muscles and improve bladder  control.  Train your bladder:  Go to the bathroom at set times, such as every 2 hours, even if you do not feel the urge to go. You can also try to hold your urine when you feel the urge to go. For example, hold your urine for 5 minutes when you feel the urge to go. As that becomes easier, hold your urine for 10 minutes.   Self-care:   Keep a UI record.  Write down how often you leak urine and how much you leak. Make a note of what you were doing when you leaked urine.  Drink liquids as directed. You may need to limit the amount of liquid you drink to help control your urine leakage. Do not drink any liquid right before you go to bed. Limit or do not have drinks that contain caffeine or alcohol.   Prevent constipation.  Eat a variety of high-fiber foods. Good examples are high-fiber cereals, beans, vegetables, and whole-grain breads. Walking is the best way to trigger your intestines to have a bowel movement.  Exercise regularly and maintain a healthy weight.  Weight loss and exercise will decrease pressure on your bladder and help you control your leakage.   Use a catheter as directed  to help empty your bladder. A catheter is a tiny, plastic tube that is put into your bladder to drain your urine.   Go to behavior therapy as directed.  Behavior therapy may be used to help you learn to control your urge to urinate.    Weight Management   Why it is important to manage your weight:  Being overweight increases your risk of health conditions such as heart disease, high blood pressure, type 2 diabetes, and certain types of cancer. It can also increase your risk for osteoarthritis, sleep apnea, and other respiratory problems. Aim for a slow, steady weight loss. Even a small amount of weight loss can lower your risk of health problems.  How to lose weight safely:  A safe and healthy way to lose weight is to eat fewer calories and get regular exercise. You can lose up about 1 pound a week by decreasing the number of calories  you eat by 500 calories each day.   Healthy meal plan for weight management:  A healthy meal plan includes a variety of foods, contains fewer calories, and helps you stay healthy. A healthy meal plan includes the following:  Eat whole-grain foods more often.  A healthy meal plan should contain fiber. Fiber is the part of grains, fruits, and vegetables that is not broken down by your body. Whole-grain foods are healthy and provide extra fiber in your diet. Some examples of whole-grain foods are whole-wheat breads and pastas, oatmeal, brown rice, and bulgur.  Eat a variety of vegetables every day.  Include dark, leafy greens such as spinach, kale, mauri greens, and mustard greens. Eat yellow and orange vegetables such as carrots, sweet potatoes, and winter squash.   Eat a variety of fruits every day.  Choose fresh or canned fruit (canned in its own juice or light syrup) instead of juice. Fruit juice has very little or no fiber.  Eat low-fat dairy foods.  Drink fat-free (skim) milk or 1% milk. Eat fat-free yogurt and low-fat cottage cheese. Try low-fat cheeses such as mozzarella and other reduced-fat cheeses.  Choose meat and other protein foods that are low in fat.  Choose beans or other legumes such as split peas or lentils. Choose fish, skinless poultry (chicken or turkey), or lean cuts of red meat (beef or pork). Before you cook meat or poultry, cut off any visible fat.   Use less fat and oil.  Try baking foods instead of frying them. Add less fat, such as margarine, sour cream, regular salad dressing and mayonnaise to foods. Eat fewer high-fat foods. Some examples of high-fat foods include french fries, doughnuts, ice cream, and cakes.  Eat fewer sweets.  Limit foods and drinks that are high in sugar. This includes candy, cookies, regular soda, and sweetened drinks.  Exercise:  Exercise at least 30 minutes per day on most days of the week. Some examples of exercise include walking, biking, dancing, and swimming.  "You can also fit in more physical activity by taking the stairs instead of the elevator or parking farther away from stores. Ask your healthcare provider about the best exercise plan for you.      © Copyright [employer] 2018 Information is for End User's use only and may not be sold, redistributed or otherwise used for commercial purposes. All illustrations and images included in CareNotes® are the copyrighted property of [employer] or [employer]         Answers submitted by the patient for this visit:  Medicare Annual Wellness Visit (Submitted on 11/24/2024)  How would you rate your overall health?: good  Compared to last year, how is your physical health?: same  In general, how satisfied are you with your life?: satisfied  Compared to last year, how is your eyesight?: same  Compared to last year, how is your hearing?: same  Compared to last year, how is your emotional/mental health?: same  How often is anger a problem for you?: never, rarely  How often do you feel unusually tired/fatigued?: sometimes  In the past 7 days, how much pain have you experienced?: some  If you answered \"some\" or \"a lot\", please rate the severity of your pain on a scale of 1 to 10 (1 being the least severe pain and 10 being the most intense pain).: 2/10  In the past 6 months, have you lost or gained 10 pounds without trying?: No  One or more falls in the last year: No  In the past 6 months, have you accidentally leaked urine?: Yes  Do you have trouble with the stairs inside or outside your home?: No  Does your home have working smoke alarms?: Yes  Does your home have a carbon monoxide monitor?: Yes  Which safety hazards (if any) have you experienced in your home? Please select all that apply.: none  How would you describe your current diet? Please select all that apply.: Regular, Limited junk food  In addition to prescription medications, are you taking any over-the-counter supplements?: Yes  If yes, what supplements are " you taking?: daily vitamin  Can you manage your medications?: Yes  Are you currently taking any opioid medications?: No  Can you walk and transfer into and out of your bed and chair?: Yes  Can you dress and groom yourself?: Yes  Can you bathe or shower yourself?: Yes  Can you feed yourself?: Yes  Can you do your laundry/ housekeeping?: Yes  Can you manage your money, pay your bills, and track your expenses?: Yes  Can you make your own meals?: Yes  Can you do your own shopping?: Yes  Within the last 12 months, have you had any hospitalizations or Emergency Department visits?: No  Do you have a living will?: Yes  Do you have a Durable POA (Power of ) for healthcare decisions?: Yes  Do you have an Advanced Directive for end of life decisions?: Yes  How often have you used an illegal drug (including marijuana) or a prescription medication for non-medical reasons in the past year?: never  What is the typical number of drinks you consume in a day?: 1  What is the typical number of drinks you consume in a week?: 1  How often did you have a drink containing alcohol in the past year?: 2 to 4 times a month  How many drinks did you have on a typical day  when you were drinking in the past year?: 0  How often did you have 6 or more drinks on one occasion in the past year?: never

## 2024-11-26 NOTE — PROGRESS NOTES
Name: Laura David      : 1950      MRN: [id number]  Encounter Provider: Eduard Alba MD  Encounter Date: 2024   Encounter department: [facility]    Assessment & Plan  Medicare annual wellness visit, subsequent            Preventive health issues were discussed with patient, and age appropriate screening tests were ordered as noted in patient's After Visit Summary. Personalized health advice and appropriate referrals for health education or preventive services given if needed, as noted in patient's After Visit Summary.    History of Present Illness     HPI   Patient Care Team:  Eduard Alba MD as PCP - General (Family Medicine)  Heath Foley DO (Orthopedic Surgery)    Review of Systems  Medical History Reviewed by provider this encounter:       Annual Wellness Visit Questionnaire   Laura is here for her Subsequent Wellness visit.     Health Risk Assessment:   Patient rates overall health as good. Patient feels that their physical health rating is same. Patient is satisfied with their life. Eyesight was rated as same. Hearing was rated as same. Patient feels that their emotional and mental health rating is same. Patients states they are never, rarely angry. Patient states they are sometimes unusually tired/fatigued. Pain experienced in the last 7 days has been some. Patient's pain rating has been 2/10. Patient states that she has experienced no weight loss or gain in last 6 months.     Depression Screening:   PHQ-2 Score: 0      Fall Risk Screening:   In the past year, patient has experienced: no history of falling in past year      Urinary Incontinence Screening:   Patient has leaked urine accidently in the last six months.     Home Safety:  Patient does not have trouble with stairs inside or outside of their home. Patient has working smoke alarms and has working carbon monoxide detector. Home safety hazards include: none.     Nutrition:   Current diet is Regular and  Limited junk food.     Medications:   Patient is currently taking over-the-counter supplements. OTC medications include: see medication list. Patient is able to manage medications.     Activities of Daily Living (ADLs)/Instrumental Activities of Daily Living (IADLs):   Walk and transfer into and out of bed and chair?: Yes  Dress and groom yourself?: Yes    Bathe or shower yourself?: Yes    Feed yourself? Yes  Do your laundry/housekeeping?: Yes  Manage your money, pay your bills and track your expenses?: Yes  Make your own meals?: Yes    Do your own shopping?: Yes    Previous Hospitalizations:   Any hospitalizations or ED visits within the last 12 months?: No      Advance Care Planning:   Living will: Yes    Durable POA for healthcare: Yes    Advanced directive: Yes      PREVENTIVE SCREENINGS      Cardiovascular Screening:    General: Screening Not Indicated and History Lipid Disorder      Colorectal Cancer Screening:     General: Screening Current      Breast Cancer Screening:     General: Screening Current      Cervical Cancer Screening:    General: Screening Not Indicated      Lung Cancer Screening:     General: Screening Not Indicated      Hepatitis C Screening:    General: Screening Current    Screening, Brief Intervention, and Referral to Treatment (SBIRT)    Screening  Typical number of drinks in a day: 1  Typical number of drinks in a week: 1  Interpretation: Low risk drinking behavior.    AUDIT-C Screenin) How often did you have a drink containing alcohol in the past year? 2 to 4 times a month  2) How many drinks did you have on a typical day when you were drinking in the past year? 0  3) How often did you have 6 or more drinks on one occasion in the past year? never    AUDIT-C Score: 2  Interpretation: Score 0-2 (female): Negative screen for alcohol misuse    Single Item Drug Screening:  How often have you used an illegal drug (including marijuana) or a prescription medication for non-medical reasons  in the past year? never    Single Item Drug Screen Score: 0  Interpretation: Negative screen for possible drug use disorder    Social Drivers of Health     Financial Resource Strain: Low Risk  (11/21/2023)    Overall Financial Resource Strain (CARDIA)     Difficulty of Paying Living Expenses: Not hard at all   Food Insecurity: No Food Insecurity (11/24/2024)    Hunger Vital Sign     Worried About Running Out of Food in the Last Year: Never true     Ran Out of Food in the Last Year: Never true   Transportation Needs: No Transportation Needs (11/24/2024)    PRAPARE - Transportation     Lack of Transportation (Medical): No     Lack of Transportation (Non-Medical): No   Housing Stability: Low Risk  (11/24/2024)    Housing Stability Vital Sign     Unable to Pay for Housing in the Last Year: No     Number of Times Moved in the Last Year: 0     Homeless in the Last Year: No   Utilities: Not At Risk (11/24/2024)    [facility] Utilities     Threatened with loss of utilities: No     No results found.    Objective   There were no vitals taken for this visit.    Physical Exam

## 2024-11-26 NOTE — PATIENT INSTRUCTIONS
Medicare wellness exam is completed.  Repeat bone density exam.  Discussion of her cholesterol medications which she has recently restarted.  Recheck lipid profile in early January.  For aches and pains, okay to take 2 extra strength Tylenol 3 times a day and meloxicam once daily as needed.  Immunizations are up-to-date.  Recheck 1 year or as needed.    Medicare Preventive Visit Patient Instructions  Thank you for completing your Welcome to Medicare Visit or Medicare Annual Wellness Visit today. Your next wellness visit will be due in one year (11/27/2025).  The screening/preventive services that you may require over the next 5-10 years are detailed below. Some tests may not apply to you based off risk factors and/or age. Screening tests ordered at today's visit but not completed yet may show as past due. Also, please note that scanned in results may not display below.  Preventive Screenings:  Service Recommendations Previous Testing/Comments   Colorectal Cancer Screening  * Colonoscopy    * Fecal Occult Blood Test (FOBT)/Fecal Immunochemical Test (FIT)  * Fecal DNA/Cologuard Test  * Flexible Sigmoidoscopy Age: 45-75 years old   Colonoscopy: every 10 years (may be performed more frequently if at higher risk)  OR  FOBT/FIT: every 1 year  OR  Cologuard: every 3 years  OR  Sigmoidoscopy: every 5 years  Screening may be recommended earlier than age 45 if at higher risk for colorectal cancer. Also, an individualized decision between you and your healthcare provider will decide whether screening between the ages of 76-85 would be appropriate. Colonoscopy: 12/10/2020  FOBT/FIT: Not on file  Cologuard: Not on file  Sigmoidoscopy: Not on file    Screening Current     Breast Cancer Screening Age: 40+ years old  Frequency: every 1-2 years  Not required if history of left and right mastectomy Mammogram: 03/05/2024    Screening Current   Cervical Cancer Screening Between the ages of 21-29, pap smear recommended once every 3  years.   Between the ages of 30-65, can perform pap smear with HPV co-testing every 5 years.   Recommendations may differ for women with a history of total hysterectomy, cervical cancer, or abnormal pap smears in past. Pap Smear: Not on file    Screening Not Indicated   Hepatitis C Screening Once for adults born between 1945 and 1965  More frequently in patients at high risk for Hepatitis C Hep C Antibody: 10/07/2019    Screening Current   Diabetes Screening 1-2 times per year if you're at risk for diabetes or have pre-diabetes Fasting glucose: 90 mg/dL (11/10/2021)  A1C: No results in last 5 years (No results in last 5 years)      Cholesterol Screening Once every 5 years if you don't have a lipid disorder. May order more often based on risk factors. Lipid panel: 11/22/2023    Screening Not Indicated  History Lipid Disorder     Other Preventive Screenings Covered by Medicare:  Abdominal Aortic Aneurysm (AAA) Screening: covered once if your at risk. You're considered to be at risk if you have a family history of AAA.  Lung Cancer Screening: covers low dose CT scan once per year if you meet all of the following conditions: (1) Age 55-77; (2) No signs or symptoms of lung cancer; (3) Current smoker or have quit smoking within the last 15 years; (4) You have a tobacco smoking history of at least 20 pack years (packs per day multiplied by number of years you smoked); (5) You get a written order from a healthcare provider.  Glaucoma Screening: covered annually if you're considered high risk: (1) You have diabetes OR (2) Family history of glaucoma OR (3)  aged 50 and older OR (4)  American aged 65 and older  Osteoporosis Screening: covered every 2 years if you meet one of the following conditions: (1) You're estrogen deficient and at risk for osteoporosis based off medical history and other findings; (2) Have a vertebral abnormality; (3) On glucocorticoid therapy for more than 3 months; (4) Have  primary hyperparathyroidism; (5) On osteoporosis medications and need to assess response to drug therapy.   Last bone density test (DXA Scan): 02/16/2022.  HIV Screening: covered annually if you're between the age of 15-65. Also covered annually if you are younger than 15 and older than 65 with risk factors for HIV infection. For pregnant patients, it is covered up to 3 times per pregnancy.    Immunizations:  Immunization Recommendations   Influenza Vaccine Annual influenza vaccination during flu season is recommended for all persons aged >= 6 months who do not have contraindications   Pneumococcal Vaccine   * Pneumococcal conjugate vaccine = PCV13 (Prevnar 13), PCV15 (Vaxneuvance), PCV20 (Prevnar 20)  * Pneumococcal polysaccharide vaccine = PPSV23 (Pneumovax) Adults 19-65 yo with certain risk factors or if 65+ yo  If never received any pneumonia vaccine: recommend Prevnar 20 (PCV20)  Give PCV20 if previously received 1 dose of PCV13 or PPSV23   Hepatitis B Vaccine 3 dose series if at intermediate or high risk (ex: diabetes, end stage renal disease, liver disease)   Respiratory syncytial virus (RSV) Vaccine - COVERED BY MEDICARE PART D  * RSVPreF3 (Arexvy) CDC recommends that adults 60 years of age and older may receive a single dose of RSV vaccine using shared clinical decision-making (SCDM)   Tetanus (Td) Vaccine - COST NOT COVERED BY MEDICARE PART B Following completion of primary series, a booster dose should be given every 10 years to maintain immunity against tetanus. Td may also be given as tetanus wound prophylaxis.   Tdap Vaccine - COST NOT COVERED BY MEDICARE PART B Recommended at least once for all adults. For pregnant patients, recommended with each pregnancy.   Shingles Vaccine (Shingrix) - COST NOT COVERED BY MEDICARE PART B  2 shot series recommended in those 19 years and older who have or will have weakened immune systems or those 50 years and older     Health Maintenance Due:      Topic Date Due     Colorectal Cancer Screening  12/10/2025    Breast Cancer Screening: Mammogram  03/05/2026    Hepatitis C Screening  Completed     Immunizations Due:  There are no preventive care reminders to display for this patient.  Advance Directives   What are advance directives?  Advance directives are legal documents that state your wishes and plans for medical care. These plans are made ahead of time in case you lose your ability to make decisions for yourself. Advance directives can apply to any medical decision, such as the treatments you want, and if you want to donate organs.   What are the types of advance directives?  There are many types of advance directives, and each state has rules about how to use them. You may choose a combination of any of the following:  Living will:  This is a written record of the treatment you want. You can also choose which treatments you do not want, which to limit, and which to stop at a certain time. This includes surgery, medicine, IV fluid, and tube feedings.   Durable power of  for healthcare (DPAHC):  This is a written record that states who you want to make healthcare choices for you when you are unable to make them for yourself. This person, called a proxy, is usually a family member or a friend. You may choose more than 1 proxy.  Do not resuscitate (DNR) order:  A DNR order is used in case your heart stops beating or you stop breathing. It is a request not to have certain forms of treatment, such as CPR. A DNR order may be included in other types of advance directives.  Medical directive:  This covers the care that you want if you are in a coma, near death, or unable to make decisions for yourself. You can list the treatments you want for each condition. Treatment may include pain medicine, surgery, blood transfusions, dialysis, IV or tube feedings, and a ventilator (breathing machine).  Values history:  This document has questions about your views, beliefs, and how you  feel and think about life. This information can help others choose the care that you would choose.  Why are advance directives important?  An advance directive helps you control your care. Although spoken wishes may be used, it is better to have your wishes written down. Spoken wishes can be misunderstood, or not followed. Treatments may be given even if you do not want them. An advance directive may make it easier for your family to make difficult choices about your care.   Urinary Incontinence   Urinary incontinence (UI)  is when you lose control of your bladder. UI develops because your bladder cannot store or empty urine properly. The 3 most common types of UI are stress incontinence, urge incontinence, or both.  Medicines:   May be given to help strengthen your bladder control. Report any side effects of medication to your healthcare provider.  Do pelvic muscle exercises often:  Your pelvic muscles help you stop urinating. Squeeze these muscles tight for 5 seconds, then relax for 5 seconds. Gradually work up to squeezing for 10 seconds. Do 3 sets of 15 repetitions a day, or as directed. This will help strengthen your pelvic muscles and improve bladder control.  Train your bladder:  Go to the bathroom at set times, such as every 2 hours, even if you do not feel the urge to go. You can also try to hold your urine when you feel the urge to go. For example, hold your urine for 5 minutes when you feel the urge to go. As that becomes easier, hold your urine for 10 minutes.   Self-care:   Keep a UI record.  Write down how often you leak urine and how much you leak. Make a note of what you were doing when you leaked urine.  Drink liquids as directed. You may need to limit the amount of liquid you drink to help control your urine leakage. Do not drink any liquid right before you go to bed. Limit or do not have drinks that contain caffeine or alcohol.   Prevent constipation.  Eat a variety of high-fiber foods. Good  examples are high-fiber cereals, beans, vegetables, and whole-grain breads. Walking is the best way to trigger your intestines to have a bowel movement.  Exercise regularly and maintain a healthy weight.  Weight loss and exercise will decrease pressure on your bladder and help you control your leakage.   Use a catheter as directed  to help empty your bladder. A catheter is a tiny, plastic tube that is put into your bladder to drain your urine.   Go to behavior therapy as directed.  Behavior therapy may be used to help you learn to control your urge to urinate.    Weight Management   Why it is important to manage your weight:  Being overweight increases your risk of health conditions such as heart disease, high blood pressure, type 2 diabetes, and certain types of cancer. It can also increase your risk for osteoarthritis, sleep apnea, and other respiratory problems. Aim for a slow, steady weight loss. Even a small amount of weight loss can lower your risk of health problems.  How to lose weight safely:  A safe and healthy way to lose weight is to eat fewer calories and get regular exercise. You can lose up about 1 pound a week by decreasing the number of calories you eat by 500 calories each day.   Healthy meal plan for weight management:  A healthy meal plan includes a variety of foods, contains fewer calories, and helps you stay healthy. A healthy meal plan includes the following:  Eat whole-grain foods more often.  A healthy meal plan should contain fiber. Fiber is the part of grains, fruits, and vegetables that is not broken down by your body. Whole-grain foods are healthy and provide extra fiber in your diet. Some examples of whole-grain foods are whole-wheat breads and pastas, oatmeal, brown rice, and bulgur.  Eat a variety of vegetables every day.  Include dark, leafy greens such as spinach, kale, mauri greens, and mustard greens. Eat yellow and orange vegetables such as carrots, sweet potatoes, and winter  squash.   Eat a variety of fruits every day.  Choose fresh or canned fruit (canned in its own juice or light syrup) instead of juice. Fruit juice has very little or no fiber.  Eat low-fat dairy foods.  Drink fat-free (skim) milk or 1% milk. Eat fat-free yogurt and low-fat cottage cheese. Try low-fat cheeses such as mozzarella and other reduced-fat cheeses.  Choose meat and other protein foods that are low in fat.  Choose beans or other legumes such as split peas or lentils. Choose fish, skinless poultry (chicken or turkey), or lean cuts of red meat (beef or pork). Before you cook meat or poultry, cut off any visible fat.   Use less fat and oil.  Try baking foods instead of frying them. Add less fat, such as margarine, sour cream, regular salad dressing and mayonnaise to foods. Eat fewer high-fat foods. Some examples of high-fat foods include french fries, doughnuts, ice cream, and cakes.  Eat fewer sweets.  Limit foods and drinks that are high in sugar. This includes candy, cookies, regular soda, and sweetened drinks.  Exercise:  Exercise at least 30 minutes per day on most days of the week. Some examples of exercise include walking, biking, dancing, and swimming. You can also fit in more physical activity by taking the stairs instead of the elevator or parking farther away from stores. Ask your healthcare provider about the best exercise plan for you.      © Copyright [employer] 2018 Information is for End User's use only and may not be sold, redistributed or otherwise used for commercial purposes. All illustrations and images included in CareNotes® are the copyrighted property of [employer] or [employer]      Medicare Preventive Visit Patient Instructions  Thank you for completing your Welcome to Medicare Visit or Medicare Annual Wellness Visit today. Your next wellness visit will be due in one year (11/27/2025).  The screening/preventive services that you may require over the next 5-10 years are  detailed below. Some tests may not apply to you based off risk factors and/or age. Screening tests ordered at today's visit but not completed yet may show as past due. Also, please note that scanned in results may not display below.  Preventive Screenings:  Service Recommendations Previous Testing/Comments   Colorectal Cancer Screening  * Colonoscopy    * Fecal Occult Blood Test (FOBT)/Fecal Immunochemical Test (FIT)  * Fecal DNA/Cologuard Test  * Flexible Sigmoidoscopy Age: 45-75 years old   Colonoscopy: every 10 years (may be performed more frequently if at higher risk)  OR  FOBT/FIT: every 1 year  OR  Cologuard: every 3 years  OR  Sigmoidoscopy: every 5 years  Screening may be recommended earlier than age 45 if at higher risk for colorectal cancer. Also, an individualized decision between you and your healthcare provider will decide whether screening between the ages of 76-85 would be appropriate. Colonoscopy: 12/10/2020  FOBT/FIT: Not on file  Cologuard: Not on file  Sigmoidoscopy: Not on file    Screening Current     Breast Cancer Screening Age: 40+ years old  Frequency: every 1-2 years  Not required if history of left and right mastectomy Mammogram: 03/05/2024    Screening Current   Cervical Cancer Screening Between the ages of 21-29, pap smear recommended once every 3 years.   Between the ages of 30-65, can perform pap smear with HPV co-testing every 5 years.   Recommendations may differ for women with a history of total hysterectomy, cervical cancer, or abnormal pap smears in past. Pap Smear: Not on file    Screening Not Indicated   Hepatitis C Screening Once for adults born between 1945 and 1965  More frequently in patients at high risk for Hepatitis C Hep C Antibody: 10/07/2019    Screening Current   Diabetes Screening 1-2 times per year if you're at risk for diabetes or have pre-diabetes Fasting glucose: 90 mg/dL (11/10/2021)  A1C: No results in last 5 years (No results in last 5 years)      Cholesterol  Screening Once every 5 years if you don't have a lipid disorder. May order more often based on risk factors. Lipid panel: 11/22/2023    Screening Not Indicated  History Lipid Disorder     Other Preventive Screenings Covered by Medicare:  Abdominal Aortic Aneurysm (AAA) Screening: covered once if your at risk. You're considered to be at risk if you have a family history of AAA.  Lung Cancer Screening: covers low dose CT scan once per year if you meet all of the following conditions: (1) Age 55-77; (2) No signs or symptoms of lung cancer; (3) Current smoker or have quit smoking within the last 15 years; (4) You have a tobacco smoking history of at least 20 pack years (packs per day multiplied by number of years you smoked); (5) You get a written order from a healthcare provider.  Glaucoma Screening: covered annually if you're considered high risk: (1) You have diabetes OR (2) Family history of glaucoma OR (3)  aged 50 and older OR (4)  American aged 65 and older  Osteoporosis Screening: covered every 2 years if you meet one of the following conditions: (1) You're estrogen deficient and at risk for osteoporosis based off medical history and other findings; (2) Have a vertebral abnormality; (3) On glucocorticoid therapy for more than 3 months; (4) Have primary hyperparathyroidism; (5) On osteoporosis medications and need to assess response to drug therapy.   Last bone density test (DXA Scan): 02/16/2022.  HIV Screening: covered annually if you're between the age of 15-65. Also covered annually if you are younger than 15 and older than 65 with risk factors for HIV infection. For pregnant patients, it is covered up to 3 times per pregnancy.    Immunizations:  Immunization Recommendations   Influenza Vaccine Annual influenza vaccination during flu season is recommended for all persons aged >= 6 months who do not have contraindications   Pneumococcal Vaccine   * Pneumococcal conjugate vaccine = PCV13  (Prevnar 13), PCV15 (Vaxneuvance), PCV20 (Prevnar 20)  * Pneumococcal polysaccharide vaccine = PPSV23 (Pneumovax) Adults 19-63 yo with certain risk factors or if 65+ yo  If never received any pneumonia vaccine: recommend Prevnar 20 (PCV20)  Give PCV20 if previously received 1 dose of PCV13 or PPSV23   Hepatitis B Vaccine 3 dose series if at intermediate or high risk (ex: diabetes, end stage renal disease, liver disease)   Respiratory syncytial virus (RSV) Vaccine - COVERED BY MEDICARE PART D  * RSVPreF3 (Arexvy) CDC recommends that adults 60 years of age and older may receive a single dose of RSV vaccine using shared clinical decision-making (SCDM)   Tetanus (Td) Vaccine - COST NOT COVERED BY MEDICARE PART B Following completion of primary series, a booster dose should be given every 10 years to maintain immunity against tetanus. Td may also be given as tetanus wound prophylaxis.   Tdap Vaccine - COST NOT COVERED BY MEDICARE PART B Recommended at least once for all adults. For pregnant patients, recommended with each pregnancy.   Shingles Vaccine (Shingrix) - COST NOT COVERED BY MEDICARE PART B  2 shot series recommended in those 19 years and older who have or will have weakened immune systems or those 50 years and older     Health Maintenance Due:      Topic Date Due    Colorectal Cancer Screening  12/10/2025    Breast Cancer Screening: Mammogram  03/05/2026    Hepatitis C Screening  Completed     Immunizations Due:  There are no preventive care reminders to display for this patient.  Advance Directives   What are advance directives?  Advance directives are legal documents that state your wishes and plans for medical care. These plans are made ahead of time in case you lose your ability to make decisions for yourself. Advance directives can apply to any medical decision, such as the treatments you want, and if you want to donate organs.   What are the types of advance directives?  There are many types of advance  directives, and each state has rules about how to use them. You may choose a combination of any of the following:  Living will:  This is a written record of the treatment you want. You can also choose which treatments you do not want, which to limit, and which to stop at a certain time. This includes surgery, medicine, IV fluid, and tube feedings.   Durable power of  for healthcare (DPAHC):  This is a written record that states who you want to make healthcare choices for you when you are unable to make them for yourself. This person, called a proxy, is usually a family member or a friend. You may choose more than 1 proxy.  Do not resuscitate (DNR) order:  A DNR order is used in case your heart stops beating or you stop breathing. It is a request not to have certain forms of treatment, such as CPR. A DNR order may be included in other types of advance directives.  Medical directive:  This covers the care that you want if you are in a coma, near death, or unable to make decisions for yourself. You can list the treatments you want for each condition. Treatment may include pain medicine, surgery, blood transfusions, dialysis, IV or tube feedings, and a ventilator (breathing machine).  Values history:  This document has questions about your views, beliefs, and how you feel and think about life. This information can help others choose the care that you would choose.  Why are advance directives important?  An advance directive helps you control your care. Although spoken wishes may be used, it is better to have your wishes written down. Spoken wishes can be misunderstood, or not followed. Treatments may be given even if you do not want them. An advance directive may make it easier for your family to make difficult choices about your care.   Urinary Incontinence   Urinary incontinence (UI)  is when you lose control of your bladder. UI develops because your bladder cannot store or empty urine properly. The 3 most  common types of UI are stress incontinence, urge incontinence, or both.  Medicines:   May be given to help strengthen your bladder control. Report any side effects of medication to your healthcare provider.  Do pelvic muscle exercises often:  Your pelvic muscles help you stop urinating. Squeeze these muscles tight for 5 seconds, then relax for 5 seconds. Gradually work up to squeezing for 10 seconds. Do 3 sets of 15 repetitions a day, or as directed. This will help strengthen your pelvic muscles and improve bladder control.  Train your bladder:  Go to the bathroom at set times, such as every 2 hours, even if you do not feel the urge to go. You can also try to hold your urine when you feel the urge to go. For example, hold your urine for 5 minutes when you feel the urge to go. As that becomes easier, hold your urine for 10 minutes.   Self-care:   Keep a UI record.  Write down how often you leak urine and how much you leak. Make a note of what you were doing when you leaked urine.  Drink liquids as directed. You may need to limit the amount of liquid you drink to help control your urine leakage. Do not drink any liquid right before you go to bed. Limit or do not have drinks that contain caffeine or alcohol.   Prevent constipation.  Eat a variety of high-fiber foods. Good examples are high-fiber cereals, beans, vegetables, and whole-grain breads. Walking is the best way to trigger your intestines to have a bowel movement.  Exercise regularly and maintain a healthy weight.  Weight loss and exercise will decrease pressure on your bladder and help you control your leakage.   Use a catheter as directed  to help empty your bladder. A catheter is a tiny, plastic tube that is put into your bladder to drain your urine.   Go to behavior therapy as directed.  Behavior therapy may be used to help you learn to control your urge to urinate.    Weight Management   Why it is important to manage your weight:  Being overweight  increases your risk of health conditions such as heart disease, high blood pressure, type 2 diabetes, and certain types of cancer. It can also increase your risk for osteoarthritis, sleep apnea, and other respiratory problems. Aim for a slow, steady weight loss. Even a small amount of weight loss can lower your risk of health problems.  How to lose weight safely:  A safe and healthy way to lose weight is to eat fewer calories and get regular exercise. You can lose up about 1 pound a week by decreasing the number of calories you eat by 500 calories each day.   Healthy meal plan for weight management:  A healthy meal plan includes a variety of foods, contains fewer calories, and helps you stay healthy. A healthy meal plan includes the following:  Eat whole-grain foods more often.  A healthy meal plan should contain fiber. Fiber is the part of grains, fruits, and vegetables that is not broken down by your body. Whole-grain foods are healthy and provide extra fiber in your diet. Some examples of whole-grain foods are whole-wheat breads and pastas, oatmeal, brown rice, and bulgur.  Eat a variety of vegetables every day.  Include dark, leafy greens such as spinach, kale, mauri greens, and mustard greens. Eat yellow and orange vegetables such as carrots, sweet potatoes, and winter squash.   Eat a variety of fruits every day.  Choose fresh or canned fruit (canned in its own juice or light syrup) instead of juice. Fruit juice has very little or no fiber.  Eat low-fat dairy foods.  Drink fat-free (skim) milk or 1% milk. Eat fat-free yogurt and low-fat cottage cheese. Try low-fat cheeses such as mozzarella and other reduced-fat cheeses.  Choose meat and other protein foods that are low in fat.  Choose beans or other legumes such as split peas or lentils. Choose fish, skinless poultry (chicken or turkey), or lean cuts of red meat (beef or pork). Before you cook meat or poultry, cut off any visible fat.   Use less fat and oil.   Try baking foods instead of frying them. Add less fat, such as margarine, sour cream, regular salad dressing and mayonnaise to foods. Eat fewer high-fat foods. Some examples of high-fat foods include french fries, doughnuts, ice cream, and cakes.  Eat fewer sweets.  Limit foods and drinks that are high in sugar. This includes candy, cookies, regular soda, and sweetened drinks.  Exercise:  Exercise at least 30 minutes per day on most days of the week. Some examples of exercise include walking, biking, dancing, and swimming. You can also fit in more physical activity by taking the stairs instead of the elevator or parking farther away from stores. Ask your healthcare provider about the best exercise plan for you.      © Copyright [employer] 2018 Information is for End User's use only and may not be sold, redistributed or otherwise used for commercial purposes. All illustrations and images included in CareNotes® are the copyrighted property of [employer]. or [employer]

## 2025-02-04 ENCOUNTER — APPOINTMENT (OUTPATIENT)
Dept: LAB | Facility: CLINIC | Age: 75
End: 2025-02-04
Payer: MEDICARE

## 2025-02-04 DIAGNOSIS — E78.2 MIXED HYPERLIPIDEMIA: ICD-10-CM

## 2025-02-04 LAB
CHOLEST SERPL-MCNC: 207 MG/DL (ref ?–200)
HDLC SERPL-MCNC: 60 MG/DL
LDLC SERPL CALC-MCNC: 127 MG/DL (ref 0–100)
NONHDLC SERPL-MCNC: 147 MG/DL
TRIGL SERPL-MCNC: 102 MG/DL (ref ?–150)

## 2025-02-04 PROCEDURE — 80061 LIPID PANEL: CPT

## 2025-02-04 PROCEDURE — 36415 COLL VENOUS BLD VENIPUNCTURE: CPT

## 2025-02-05 ENCOUNTER — RESULTS FOLLOW-UP (OUTPATIENT)
Dept: FAMILY MEDICINE CLINIC | Facility: CLINIC | Age: 75
End: 2025-02-05

## 2025-02-05 NOTE — RESULT ENCOUNTER NOTE
Cholesterol is significantly improved since November 2023.  And quite acceptable.  Continue atorvastatin.

## 2025-02-13 ENCOUNTER — HOSPITAL ENCOUNTER (OUTPATIENT)
Dept: ULTRASOUND IMAGING | Facility: CLINIC | Age: 75
Discharge: HOME | End: 2025-02-13
Payer: MEDICARE

## 2025-02-13 ENCOUNTER — TELEPHONE (OUTPATIENT)
Dept: MAMMOGRAPHY | Facility: CLINIC | Age: 75
End: 2025-02-13

## 2025-02-13 ENCOUNTER — RESULTS FOLLOW-UP (OUTPATIENT)
Dept: FAMILY MEDICINE CLINIC | Facility: CLINIC | Age: 75
End: 2025-02-13

## 2025-02-13 ENCOUNTER — HOSPITAL ENCOUNTER (OUTPATIENT)
Dept: MAMMOGRAPHY | Facility: CLINIC | Age: 75
Discharge: HOME | End: 2025-02-13
Payer: MEDICARE

## 2025-02-13 VITALS — HEIGHT: 64 IN | WEIGHT: 188 LBS | BODY MASS INDEX: 32.1 KG/M2

## 2025-02-13 DIAGNOSIS — N64.52 NIPPLE DISCHARGE: ICD-10-CM

## 2025-02-13 DIAGNOSIS — N64.52 NIPPLE DISCHARGE: Primary | ICD-10-CM

## 2025-02-13 PROCEDURE — 76642 ULTRASOUND BREAST LIMITED: CPT

## 2025-02-13 PROCEDURE — G0279 TOMOSYNTHESIS, MAMMO: HCPCS

## 2025-02-13 PROCEDURE — 77066 DX MAMMO INCL CAD BI: CPT

## 2025-02-14 ENCOUNTER — TELEPHONE (OUTPATIENT)
Dept: HEMATOLOGY ONCOLOGY | Facility: CLINIC | Age: 75
End: 2025-02-14

## 2025-02-14 NOTE — TELEPHONE ENCOUNTER
Referral to Surgical Oncology received.  Chart reviewed by  for Surgical oncology at this time.       Diagnosis:   N64.52 (ICD-10-CM) - Nipple discharge     I spoke with Sagrario from [facility]. She stated Dr. Lira wanted patient to see the surgeon 1st to determine if patient will need to do a breast MRI.    After review of chart, instructions for scheduling added to referral and sent to be scheduled as advised.

## 2025-02-24 PROBLEM — N64.52 NIPPLE DISCHARGE: Status: ACTIVE | Noted: 2025-02-24

## 2025-02-24 PROBLEM — R92.8 ABNORMAL MAMMOGRAM: Status: ACTIVE | Noted: 2025-02-24

## 2025-02-28 ENCOUNTER — APPOINTMENT (OUTPATIENT)
Dept: LAB | Facility: CLINIC | Age: 75
End: 2025-02-28
Payer: MEDICARE

## 2025-02-28 ENCOUNTER — OFFICE VISIT (OUTPATIENT)
Dept: SURGICAL ONCOLOGY | Facility: CLINIC | Age: 75
End: 2025-02-28
Payer: MEDICARE

## 2025-02-28 VITALS
HEIGHT: 64 IN | BODY MASS INDEX: 32.61 KG/M2 | HEART RATE: 67 BPM | DIASTOLIC BLOOD PRESSURE: 80 MMHG | OXYGEN SATURATION: 92 % | WEIGHT: 191 LBS | SYSTOLIC BLOOD PRESSURE: 118 MMHG

## 2025-02-28 DIAGNOSIS — R92.8 ABNORMAL MAMMOGRAM: ICD-10-CM

## 2025-02-28 DIAGNOSIS — N64.52 NIPPLE DISCHARGE: Primary | ICD-10-CM

## 2025-02-28 PROCEDURE — 88112 CYTOPATH CELL ENHANCE TECH: CPT | Performed by: PATHOLOGY

## 2025-02-28 PROCEDURE — 99205 OFFICE O/P NEW HI 60 MIN: CPT | Performed by: SURGERY

## 2025-02-28 NOTE — PROGRESS NOTES
"Name: Laura David      : 1950      MRN: [id number]  Encounter Provider: Kinga Nesbitt MD  Encounter Date: 2025   Encounter department: [facility]  :  Assessment & Plan  Nipple discharge    Orders:    Ambulatory referral to Surgical Oncology    MRI breast bilateral w and wo contrast w cad; Future    Prolactin; Future    Abnormal mammogram         74-year-old female with clear nipple discharge.  Clear fluid for cytology.  MRI of the breast  Prolactin level  Follow-up after MRI        History of Present Illness   Laura David is a 74 y.o. year old female who presents with clear/cloudy left breast nipple discharge approximately 1 month.  She had a mammogram and ultrasound which demonstrated retroareolar 0.6 cm mass.  She has no family history of breast cancer.  Family history of colon cancer.  She is a non-smoker.  She is here to discuss further workup and management.       Review of Systems   Constitutional:  Negative for chills and fever.   HENT:  Negative for ear pain and sore throat.    Eyes:  Negative for pain and visual disturbance.   Respiratory:  Negative for cough and shortness of breath.    Cardiovascular:  Negative for chest pain and palpitations.   Gastrointestinal:  Negative for abdominal pain and vomiting.   Genitourinary:  Negative for dysuria and hematuria.   Musculoskeletal:  Negative for arthralgias and back pain.   Skin:  Negative for color change and rash.   Neurological:  Negative for seizures and syncope.   All other systems reviewed and are negative.   A complete review of systems is negative other than that noted above in the HPI.    Medical History Reviewed by provider this encounter:     .       Objective   /80 (BP Location: Right arm, Patient Position: Sitting)   Pulse 67   Ht 5' 4\" (1.626 m)   Wt 86.6 kg (191 lb)   SpO2 92%   BMI 32.79 kg/m²     Pain Screening:     ECOG    Physical Exam  Constitutional:  " "     Appearance: Normal appearance.   HENT:      Head: Normocephalic and atraumatic.      Nose: Nose normal.      Mouth/Throat:      Mouth: Mucous membranes are moist.   Eyes:      Pupils: Pupils are equal, round, and reactive to light.   Cardiovascular:      Rate and Rhythm: Normal rate.      Pulses: Normal pulses.      Heart sounds: Normal heart sounds.   Pulmonary:      Effort: Pulmonary effort is normal.      Breath sounds: Normal breath sounds.   Chest:          Comments: Clear left nipple discharge..  Right breast no nipple discharge.  Bilateral breast examination no palpable mass masses no nipple retraction.  Bilateral axillary and supraclavicular examination no palpable adenopathy.  Patient was examined seated as well as supine position.  Abdominal:      General: Bowel sounds are normal.      Palpations: Abdomen is soft.   Musculoskeletal:         General: Normal range of motion.      Cervical back: Normal range of motion and neck supple.   Skin:     General: Skin is warm.   Neurological:      General: No focal deficit present.      Mental Status: She is alert and oriented to person, place, and time.   Psychiatric:         Mood and Affect: Mood normal.         Behavior: Behavior normal.         Thought Content: Thought content normal.         Judgment: Judgment normal.          Labs: I have reviewed pertinent labs. No results found for: \"WBC\", \"RBC\", \"HGB\", \"HCT\", \"MCV\", \"MCH\", \"RDW\", \"PLT\", \"NEUTOPHILPCT\", \"BANDSPCT\", \"LYMPHOPCT\", \"MONOPCT\", \"EOSPCT\", \"BASOPCT\", \"IMMGRANS\", \"NEUTROABS\"   No results found for: \"SODIUM\", \"K\", \"CL\", \"CO2\", \"AGAP\", \"BUN\", \"CREATININE\", \"GLUC\", \"GLUF\", \"CALCIUM\", \"CORRECTEDCA\", \"AST\", \"ALT\", \"ALKPHOS\", \"TP\", \"ALB\", \"TBILI\", \"EGFR\"   Appointment on 02/04/2025   Component Date Value Ref Range Status    Cholesterol 02/04/2025 207 (H)  See Comment mg/dL Final    Cholesterol:         Pediatric <18 Years        Desirable          <170 mg/dL      Borderline High    170-199 mg/dL      " High               >=200 mg/dL        Adult >=18 Years            Desirable         <200 mg/dL      Borderline High   200-239 mg/dL      High              >239 mg/dL      Triglycerides 02/04/2025 102  See Comment mg/dL Final    Triglyceride:     0-9Y            <75mg/dL     10Y-17Y         <90 mg/dL       >=18Y     Normal          <150 mg/dL     Borderline High 150-199 mg/dL     High            200-499 mg/dL        Very High       >499 mg/dL    Specimen collection should occur prior to Metamizole administration due to the potential for falsely depressed results.    HDL, Direct 02/04/2025 60  >=50 mg/dL Final    LDL Calculated 02/04/2025 127 (H)  0 - 100 mg/dL Final    LDL Cholesterol:     Optimal           <100 mg/dl     Near Optimal      100-129 mg/dl     Above Optimal       Borderline High 130-159 mg/dl       High            160-189 mg/dl       Very High       >189 mg/dl         This screening LDL is a calculated result.   It does not have the accuracy of the Direct Measured LDL in the monitoring of patients with hyperlipidemia and/or statin therapy.   Direct Measure LDL ([id number]) must be ordered separately in these patients.    Non-HDL-Chol (CHOL-HDL) 02/04/2025 147  mg/dl Final     Pathology: I have reviewed pathology reports described above.    Radiology Results Review: I have reviewed radiology reports from 2/13/2020 including: Mammogram and Ultrasound(s).  Concordance: yes  Narrative & Impression   DIAGNOSIS: Nipple discharge      TECHNIQUE:   Digital diagnostic mammography was performed. Computer Aided Detection (CAD) analyzed all applicable images.  Left breast ultrasound was performed.      COMPARISONS: Prior breast imaging dated: 03/05/2024, 02/20/2023, 02/17/2022, 02/11/2021, 02/06/2020, 01/31/2019, 10/23/2017, 10/17/2016, 10/12/2015, and 09/30/2013     RELEVANT HISTORY:   Family Breast Cancer History: No known family history of breast cancer.  Family Medical History: Family medical history includes colon  cancer in 2 relatives (family, father) and ovarian cancer in cousin.   Personal History: No known relevant hormone history. No known relevant surgical history. No known relevant medical history.     RISK ASSESSMENT:   5 Year Tyrer-Cuzick: 1.29%  10 Year Tyrer-Cuzick: 2.74%  Lifetime Tyrer-Cuzick: 3.69%     TISSUE DENSITY:   There are scattered areas of fibroglandular density.     INDICATION: Laura David is a 74 y.o. female presenting for spontaneous clear discharge from left nipple.     FINDINGS:   Bilateral  Mammo diagnostic bilateral w 3d and cad  There are no suspicious masses, grouped microcalcifications or areas of unexplained architectural distortion. The skin and nipple areolar complex are unremarkable.   No definite retroareolar finding is visualized in the left breast.  Left  US breast left limited (diagnostic)  Targeted ultrasound evaluation of the left retroareolar and periareolar breast demonstrates a questionable nipple mass measuring approximately 0.3 x 0.6 cm with no definite vascular flow.        IMPRESSION:  Left breast: Questionable nipple mass measuring approximately 0.6 cm.  Surgical evaluation is recommended given the patient's history of recent clear nipple discharge.  Further evaluation with MRI could be considered.     Right breast: No mammographic evidence of malignancy.     The findings and the recommendation were discussed by me with the patient and she expressed understanding.  Surgical referral is in place.     ASSESSMENT/BI-RADS CATEGORY:  Overall: 2 - Benign     RECOMMENDATION:       - Clinical management for the left breast.       - Routine screening mammogram in 1 year of breast(s).

## 2025-02-28 NOTE — ASSESSMENT & PLAN NOTE
Orders:    Ambulatory referral to Surgical Oncology    MRI breast bilateral w and wo contrast w cad; Future    Prolactin; Future

## 2025-03-01 ENCOUNTER — APPOINTMENT (OUTPATIENT)
Dept: LAB | Facility: CLINIC | Age: 75
End: 2025-03-01
Payer: MEDICARE

## 2025-03-01 DIAGNOSIS — N64.52 NIPPLE DISCHARGE: ICD-10-CM

## 2025-03-01 LAB
ANION GAP SERPL CALCULATED.3IONS-SCNC: 10 MMOL/L (ref 4–13)
BUN SERPL-MCNC: 15 MG/DL (ref 5–25)
CALCIUM SERPL-MCNC: 9.2 MG/DL (ref 8.4–10.2)
CHLORIDE SERPL-SCNC: 104 MMOL/L (ref 96–108)
CO2 SERPL-SCNC: 26 MMOL/L (ref 21–32)
CREAT SERPL-MCNC: 0.77 MG/DL (ref 0.6–1.3)
GFR SERPL CREATININE-BSD FRML MDRD: 77 ML/MIN/1.73SQ M
GLUCOSE P FAST SERPL-MCNC: 96 MG/DL (ref 65–99)
POTASSIUM SERPL-SCNC: 4.7 MMOL/L (ref 3.5–5.3)
PROLACTIN SERPL-MCNC: 10.03 NG/ML (ref 2.74–19.64)
SODIUM SERPL-SCNC: 140 MMOL/L (ref 135–147)

## 2025-03-01 PROCEDURE — 36415 COLL VENOUS BLD VENIPUNCTURE: CPT

## 2025-03-01 PROCEDURE — 80048 BASIC METABOLIC PNL TOTAL CA: CPT

## 2025-03-01 PROCEDURE — 84146 ASSAY OF PROLACTIN: CPT

## 2025-03-04 ENCOUNTER — HOSPITAL ENCOUNTER (OUTPATIENT)
Dept: RADIOLOGY | Facility: HOSPITAL | Age: 75
Discharge: HOME | End: 2025-03-04
Attending: SURGERY
Payer: MEDICARE

## 2025-03-04 DIAGNOSIS — N64.52 NIPPLE DISCHARGE: ICD-10-CM

## 2025-03-04 PROCEDURE — C8937 CAD BREAST MRI: HCPCS

## 2025-03-04 PROCEDURE — 88112 CYTOPATH CELL ENHANCE TECH: CPT | Performed by: PATHOLOGY

## 2025-03-04 PROCEDURE — C8908 MRI W/O FOL W/CONT, BREAST,: HCPCS

## 2025-03-04 PROCEDURE — A9585 GADOBUTROL INJECTION: HCPCS | Performed by: SURGERY

## 2025-03-04 RX ORDER — GADOBUTROL 604.72 MG/ML
8 INJECTION INTRAVENOUS
Status: COMPLETED | OUTPATIENT
Start: 2025-03-04 | End: 2025-03-04

## 2025-03-04 RX ADMIN — GADOBUTROL 8 ML: 604.72 INJECTION INTRAVENOUS at 18:30

## 2025-03-10 NOTE — PROGRESS NOTES
Call placed to patient regarding recommendation for;    __X___ RIGHT ___X___LEFT      __X___Ultrasound guided  ______Stereotactic breast biopsy.    Pt states that procedure was explained to her, additional questions answered at this time    __X___Verbalized understanding.    Reviewed clip placement with patient, pt states understanding: Yes: __X__ No: ____  Comments:    Blood thinners: No: __X___ Yes: _____ What:     Biopsy teaching sheet given:  _____yes __X__no (All teaching points discussed during call, pt with no questions at this time, pt adv to arrive at 0745 for 0800 US followed by biopsy)    Pt given name/# for any further questions/needs    Pt agreeable to a post procedure call and states we can give her biopsy results to her over the phone

## 2025-03-11 ENCOUNTER — TELEPHONE (OUTPATIENT)
Dept: SURGICAL ONCOLOGY | Facility: CLINIC | Age: 75
End: 2025-03-11

## 2025-03-11 NOTE — TELEPHONE ENCOUNTER
Spoke with patient to reschedule her follow up with Dr. Nesbitt for after her biopsy on 4/2/25. New appointment made for 4/11/25 at [facility]. Patient aware and agreeable to new appointment date, time and location. Patient appreciative of call.

## 2025-04-02 ENCOUNTER — HOSPITAL ENCOUNTER (OUTPATIENT)
Dept: MAMMOGRAPHY | Facility: CLINIC | Age: 75
Discharge: HOME | End: 2025-04-02
Payer: MEDICARE

## 2025-04-02 ENCOUNTER — HOSPITAL ENCOUNTER (OUTPATIENT)
Dept: ULTRASOUND IMAGING | Facility: CLINIC | Age: 75
Discharge: HOME | End: 2025-04-02
Payer: MEDICARE

## 2025-04-02 VITALS — WEIGHT: 191 LBS | BODY MASS INDEX: 32.61 KG/M2 | HEIGHT: 64 IN

## 2025-04-02 VITALS — SYSTOLIC BLOOD PRESSURE: 129 MMHG | HEART RATE: 61 BPM | DIASTOLIC BLOOD PRESSURE: 79 MMHG

## 2025-04-02 DIAGNOSIS — R92.8 ABNORMAL MRI, BREAST: ICD-10-CM

## 2025-04-02 DIAGNOSIS — R92.8 ABNORMAL ULTRASOUND OF BREAST: ICD-10-CM

## 2025-04-02 PROCEDURE — 19084 BX BREAST ADD LESION US IMAG: CPT

## 2025-04-02 PROCEDURE — A4648 IMPLANTABLE TISSUE MARKER: HCPCS

## 2025-04-02 PROCEDURE — 88341 IMHCHEM/IMCYTCHM EA ADD ANTB: CPT | Performed by: STUDENT IN AN ORGANIZED HEALTH CARE EDUCATION/TRAINING PROGRAM

## 2025-04-02 PROCEDURE — 19083 BX BREAST 1ST LESION US IMAG: CPT

## 2025-04-02 PROCEDURE — 88305 TISSUE EXAM BY PATHOLOGIST: CPT | Performed by: STUDENT IN AN ORGANIZED HEALTH CARE EDUCATION/TRAINING PROGRAM

## 2025-04-02 PROCEDURE — 76642 ULTRASOUND BREAST LIMITED: CPT

## 2025-04-02 PROCEDURE — 88342 IMHCHEM/IMCYTCHM 1ST ANTB: CPT | Performed by: STUDENT IN AN ORGANIZED HEALTH CARE EDUCATION/TRAINING PROGRAM

## 2025-04-02 RX ORDER — LIDOCAINE HYDROCHLORIDE 10 MG/ML
5 INJECTION, SOLUTION EPIDURAL; INFILTRATION; INTRACAUDAL; PERINEURAL ONCE
Status: COMPLETED | OUTPATIENT
Start: 2025-04-02 | End: 2025-04-02

## 2025-04-02 RX ADMIN — LIDOCAINE HYDROCHLORIDE 5 ML: 10 INJECTION, SOLUTION EPIDURAL; INFILTRATION; INTRACAUDAL; PERINEURAL at 08:58

## 2025-04-02 RX ADMIN — LIDOCAINE HYDROCHLORIDE 5 ML: 10 INJECTION, SOLUTION EPIDURAL; INFILTRATION; INTRACAUDAL; PERINEURAL at 09:05

## 2025-04-02 NOTE — PROGRESS NOTES
Procedure type: Site 1    ___x__ultrasound guided _____stereotactic    Breast:    __x___Left _____Right    Location: Left Retro    Needle: 14g    # of passes: 5    Clip: Open Coil     Procedure type: Site 2    __x___ultrasound guided _____stereotactic    Breast:    _____Left ___x__Right    Location: Right Retro    Needle: 14g    # of passes: 5    Clip: Butterfly     Performed by: Dr. Lira     Pressure held for 5 minutes by: Belle Gonzalez Strips:    ___X__yes _____no    Band aid:    __X___yes_____no    Tolerated procedure:    __X___yes _____no

## 2025-04-03 NOTE — PROGRESS NOTES
Post procedure call completed    Bleeding: _____yes __X___no (pt denies)    Pain: _____yes ___X___no (pt denies)    Redness/Swelling: ______yes ___X___no (pt denies)    Band aid removed: __X___yes _____no     Steri-Strips intact: ___X___yes _____no (discussed with patient to remove steri strips on Monday if they have not come off on their own)    Pt with no questions at this time, adv will call when results available, adv to call with any questions or concerns, has name/# for contact

## 2025-04-04 PROCEDURE — 88342 IMHCHEM/IMCYTCHM 1ST ANTB: CPT | Performed by: STUDENT IN AN ORGANIZED HEALTH CARE EDUCATION/TRAINING PROGRAM

## 2025-04-04 PROCEDURE — 88341 IMHCHEM/IMCYTCHM EA ADD ANTB: CPT | Performed by: STUDENT IN AN ORGANIZED HEALTH CARE EDUCATION/TRAINING PROGRAM

## 2025-04-04 PROCEDURE — 88305 TISSUE EXAM BY PATHOLOGIST: CPT | Performed by: STUDENT IN AN ORGANIZED HEALTH CARE EDUCATION/TRAINING PROGRAM

## 2025-04-07 PROBLEM — D36.9 INTRADUCTAL PAPILLOMA: Status: ACTIVE | Noted: 2025-04-07

## 2025-04-07 PROBLEM — N64.89 RADIAL SCAR OF BREAST: Status: ACTIVE | Noted: 2025-04-07

## 2025-04-11 ENCOUNTER — OFFICE VISIT (OUTPATIENT)
Dept: SURGICAL ONCOLOGY | Facility: CLINIC | Age: 75
End: 2025-04-11
Payer: MEDICARE

## 2025-04-11 VITALS
DIASTOLIC BLOOD PRESSURE: 80 MMHG | WEIGHT: 193 LBS | SYSTOLIC BLOOD PRESSURE: 128 MMHG | BODY MASS INDEX: 32.95 KG/M2 | OXYGEN SATURATION: 97 % | HEIGHT: 64 IN | HEART RATE: 73 BPM

## 2025-04-11 DIAGNOSIS — D36.9 INTRADUCTAL PAPILLOMA: ICD-10-CM

## 2025-04-11 DIAGNOSIS — N64.89 RADIAL SCAR OF BREAST: ICD-10-CM

## 2025-04-11 DIAGNOSIS — N64.52 NIPPLE DISCHARGE: Primary | ICD-10-CM

## 2025-04-11 PROCEDURE — 99215 OFFICE O/P EST HI 40 MIN: CPT | Performed by: SURGERY

## 2025-04-11 RX ORDER — ACETAMINOPHEN AND CODEINE PHOSPHATE 300; 30 MG/1; MG/1
1 TABLET ORAL EVERY 6 HOURS PRN
Qty: 20 TABLET | Refills: 0 | Status: SHIPPED | OUTPATIENT
Start: 2025-04-11

## 2025-04-11 NOTE — ASSESSMENT & PLAN NOTE
Orders:    Case request operating room: RIGHT AND LEFT BREAST [facility]  DIRECTED LUMPECTOMY; Standing    CBC and differential; Future    Comprehensive metabolic panel; Future    EKG 12 lead; Future    XR chest pa and lateral; Future    acetaminophen-codeine (TYLENOL with CODEINE #3) 300-30 MG per tablet; Take 1 tablet by mouth every 6 (six) hours as needed for moderate pain

## 2025-04-11 NOTE — PROGRESS NOTES
Name: Laura David      : 1950      MRN: [id number]  Encounter Provider: Kinga Nesbitt MD  Encounter Date: 2025   Encounter department: [facility] [place]  :  Assessment & Plan  Nipple discharge         Intraductal papilloma    Orders:    Case request operating room: RIGHT AND LEFT BREAST GRETEL  DIRECTED LUMPECTOMY; Standing    CBC and differential; Future    Comprehensive metabolic panel; Future    EKG 12 lead; Future    XR chest pa and lateral; Future    acetaminophen-codeine (TYLENOL with CODEINE #3) 300-30 MG per tablet; Take 1 tablet by mouth every 6 (six) hours as needed for moderate pain    Radial scar of breast         74-year-old female with bloody nipple discharge on the left side she underwent bilateral breast biopsy.  Left breast biopsy is consistent with intraductal papilloma with radial sclerosing lesion and radial scar right breast intraductal papilloma.  She is here after biopsies.  We did the review and discussed the pathology in details observation as well as surgical resections were discussed.  After our extensive discussion patient prefers bilateral lumpectomy.  Surgical consent was obtained for left and right breast Gretel  directed lumpectomy after explaining the benefit procedure alternative and possible complications.  All patient's questions answered to her satisfaction patient is agreeable with surgical procedures.        History of Present Illness   Laura David is a 74 y.o. year old female who presents bilateral breast intraductal papilloma.       Review of Systems   Constitutional:  Negative for chills and fever.   HENT:  Negative for ear pain and sore throat.    Eyes:  Negative for pain and visual disturbance.   Respiratory:  Negative for cough and shortness of breath.    Cardiovascular:  Negative for chest pain and palpitations.   Gastrointestinal:  Negative for abdominal pain and vomiting.   Genitourinary:   "Negative for dysuria and hematuria.   Musculoskeletal:  Negative for arthralgias and back pain.   Skin:  Negative for color change and rash.   Neurological:  Negative for seizures and syncope.   All other systems reviewed and are negative.   A complete review of systems is negative other than that noted above in the HPI.    Medical History Reviewed by provider this encounter:     .       Objective   /80 (BP Location: Left arm, Patient Position: Sitting)   Pulse 73   Ht 5' 4\" (1.626 m)   Wt 87.5 kg (193 lb)   SpO2 97%   BMI 33.13 kg/m²     Pain Screening:     ECOG    Physical Exam  Constitutional:       Appearance: Normal appearance.   HENT:      Head: Normocephalic and atraumatic.      Nose: Nose normal.      Mouth/Throat:      Mouth: Mucous membranes are moist.   Eyes:      Pupils: Pupils are equal, round, and reactive to light.   Cardiovascular:      Rate and Rhythm: Normal rate.      Pulses: Normal pulses.      Heart sounds: Normal heart sounds.   Pulmonary:      Effort: Pulmonary effort is normal.      Breath sounds: Normal breath sounds.   Chest:      Comments: Bilateral breast hematoma and bruise both breast.  No palpable mass masses nipple retraction or skin changes.  Bilateral axilla and supraclavicular examination no palpable adenopathy.  Abdominal:      General: Bowel sounds are normal.      Palpations: Abdomen is soft.   Musculoskeletal:         General: Normal range of motion.      Cervical back: Normal range of motion and neck supple.   Skin:     General: Skin is warm.   Neurological:      General: No focal deficit present.      Mental Status: She is alert and oriented to person, place, and time.   Psychiatric:         Mood and Affect: Mood normal.         Behavior: Behavior normal.         Thought Content: Thought content normal.         Judgment: Judgment normal.          Labs: I have reviewed pertinent labs. No results found for: \"WBC\", \"RBC\", \"HGB\", \"HCT\", \"MCV\", \"MCH\", \"RDW\", \"PLT\", " "\"NEUTOPHILPCT\", \"BANDSPCT\", \"LYMPHOPCT\", \"MONOPCT\", \"EOSPCT\", \"BASOPCT\", \"IMMGRANS\", \"NEUTROABS\"   Lab Results   Component Value Date/Time    Sodium 140 03/01/2025 07:25 AM    Potassium 4.7 03/01/2025 07:25 AM    Chloride 104 03/01/2025 07:25 AM    CO2 26 03/01/2025 07:25 AM    ANION GAP 10 03/01/2025 07:25 AM    BUN 15 03/01/2025 07:25 AM    Creatinine 0.77 03/01/2025 07:25 AM    Glucose, Fasting 96 03/01/2025 07:25 AM    Calcium 9.2 03/01/2025 07:25 AM    eGFR 77 03/01/2025 07:25 AM      Hospital Outpatient Visit on 04/02/2025   Component Date Value Ref Range Status    Case Report 04/02/2025    Final                    Value:Surgical Pathology Report                         Case: [id number]                                  Authorizing Provider:  Kinga Nesbitt,  Collected:           04/02/2025 0901                                     MD                                                                           Ordering Location:     [facility] Received:            04/02/2025 1908                                     Center                                                                       Pathologist:           Ralph Waters MD                                                         Specimens:   A) - Breast, Left, us bx lt breast retroareolar 5 passes 14g                                        B) - Breast, Right, us bx rt breast retroareolar 5 passes 14g                              Final Diagnosis 04/02/2025    Final                    Value:A. Breast, Left, us bx lt breast retroareolar 5 passes 14g:      - Portion of intraductal papilloma (1.5 mm in greatest dimension), see note       - Separate portion of breast parenchyma with features consistent with radial scar/radial sclerosing lesion      - Negative for malignancy       B. Breast, Right, us bx rt breast retroareolar 5 passes 14g:      - Portion of intraductal papilloma (3 mm in greatest dimension), see note       - Negative " "for malignancy          Note 04/02/2025    Final                    Value:On blocks A2-A3 and B2-B3, immunohistochemical stains for p63, SMMHC, and Calponin are all positive supporting the diagnosis.       Additional Information 04/02/2025    Final                    Value:Interpretation performed at [facility] [address], [place] [address]    All reported additional testing was performed with appropriately reactive controls.  These tests were developed and their performance characteristics determined by [facility] or appropriate performing facility, though some tests may be performed on tissues which have not been validated for performance characteristics (such as staining performed on alcohol exposed cell blocks and decalcified tissues).  Results should be interpreted with caution and in the context of the patients’ clinical condition. These tests may not be cleared or approved by the U.S. Food and Drug Administration, though the FDA has determined that such clearance or approval is not necessary. These tests are used for clinical purposes and they should not be regarded as investigational or for research. This laboratory has been approved by CLIA 88, designated as a high-complexity laboratory and is qualified to                           perform these tests.  .      Gross Description 04/02/2025    Final                    Value:A. The specimen is received in formalin, labeled with the patient's name and hospital number, and is designated \" left breast ultrasound biopsy retroareolar, 5 passes, 14-gauge\".  The specimen consists of 6 yellow fatty and friable tissue cores measuring less than 0.1-0.2 cm in diameter and ranging from 0.5-1.2 cm in length.  The specimen is entirely submitted between sponges, 3 cassettes.  B. The specimen is received in formalin, labeled with the patient's name and hospital number, and is designated \" right breast ultrasound biopsy retroareolar 5 " "passes, 14-gauge\".  The specimen consists of 6 yellow fatty and friable tissue cores measuring less than 0.1-0.2 cm in diameter and ranging from 0.5-1.6 cm in length.  The specimen is entirely submitted between sponges, 3 cassettes.    Note: The estimated total formalin fixation time based upon information provided by the submitting clinician and the standard processing schedule is 14 hours.  The cold ischemia time based upon information                           provided by the submitting clinician and receiving staff in the laboratory is within 1 minute.       -DANYELleeannciararafael Brandon      Clinical Information 04/02/2025    Final                    Value:FINDINGS:   LEFT  1) MASS [E]  US guidance breast biopsy left each additional: Images of the left breast mass in the retroareolar region were obtained. The patient was placed supine on the biopsy table. A core needle biopsy was performed under ultrasound guidance using a lateral approach. The skin was prepped in the usual fashion. Anesthesia was administered using 5 mL of lidocaine 1%. A 14 G core needle biopsy device was advanced. Using the device, 5 samples were collected.   An open coil clip was inserted into the target area. The patient tolerated the procedure well. There were no immediate complications.      RIGHT  2) SOLITARY DILATED DUCT [D]  US guided breast biopsy right complete: Images of the right breast solitary dilated duct in the retroareolar region in the middle portion were obtained. The patient was placed supine on the biopsy table. A core needle biopsy was performed under ultrasound guidance using a medial approach. The skin was prepped in the usual fashion.                           Anesthesia was administered using 5 mL of lidocaine 1%. A 14 G core needle biopsy device was advanced. Using the device, 5 samples were collected.   A butterfly clip was inserted into the target area. The patient tolerated the procedure well. There were no immediate " complications.       IMPRESSION:   Left breast: Successful ultrasound-guided biopsy of the retroareolar breast marked with an open coil.  Pathology pending.  Right breast: Successful ultrasound-guided biopsy of the right breast ductal retroareolar marked with a butterfly clip.  Pathology pending.       Pathology: I have reviewed pathology reports described above.    Radiology Results Review: I personally reviewed the following image studies in PACS and associated radiology reports: MRI of the breast, mammogram and breast ultrasounds. My interpretation of the radiology images/reports is: Reviewed need for biopsy was discussed.  Subsequently patient underwent biopsy and biopsy results are consistent with intraductal papilloma bilaterally and left breast radial scar.  Concordance: yes    Administrative Statements   I have spent a total time of 40 minutes in caring for this patient on the day of the visit/encounter including Risks and benefits of tx options, Instructions for management, Patient and family education, Importance of tx compliance, Risk factor reductions, Impressions, Counseling / Coordination of care, Documenting in the medical record, and Reviewing/placing orders in the medical record (including tests, medications, and/or procedures).

## 2025-04-14 NOTE — PROGRESS NOTES
Call placed to patient regarding recommendation for;    __X___ RIGHT x1___X___LEFT x1      __X___SAVI  placement.    Procedure explained to patient, additional questions answered at this time    __X___Verbalized understanding.      Blood thinners:  _____yes __X___no    Date stopped: ___N/A____    All teaching points discussed during call, pt with no questions at this time, pt adv to arrive at 1230 for 1300 insertion    Pt given name/# for any further questions/needs

## 2025-04-21 PROBLEM — Z01.818 PRE-OP EXAM: Status: ACTIVE | Noted: 2025-04-21

## 2025-04-24 ENCOUNTER — HOSPITAL ENCOUNTER (OUTPATIENT)
Dept: RADIOLOGY | Facility: HOSPITAL | Age: 75
Discharge: HOME | End: 2025-04-24
Payer: MEDICARE

## 2025-04-24 ENCOUNTER — APPOINTMENT (OUTPATIENT)
Dept: LAB | Facility: CLINIC | Age: 75
End: 2025-04-24
Payer: MEDICARE

## 2025-04-24 ENCOUNTER — OFFICE VISIT (OUTPATIENT)
Dept: LAB | Facility: CLINIC | Age: 75
End: 2025-04-24
Attending: SURGERY
Payer: MEDICARE

## 2025-04-24 DIAGNOSIS — D36.9 INTRADUCTAL PAPILLOMA: ICD-10-CM

## 2025-04-24 LAB
ALBUMIN SERPL BCG-MCNC: 4.1 G/DL (ref 3.5–5)
ALP SERPL-CCNC: 72 U/L (ref 34–104)
ALT SERPL W P-5'-P-CCNC: 20 U/L (ref 7–52)
ANION GAP SERPL CALCULATED.3IONS-SCNC: 8 MMOL/L (ref 4–13)
AST SERPL W P-5'-P-CCNC: 23 U/L (ref 13–39)
BASOPHILS # BLD AUTO: 0.05 THOUSANDS/ÂΜL (ref 0–0.1)
BASOPHILS NFR BLD AUTO: 1 % (ref 0–1)
BILIRUB SERPL-MCNC: 0.87 MG/DL (ref 0.2–1)
BUN SERPL-MCNC: 17 MG/DL (ref 5–25)
CALCIUM SERPL-MCNC: 9.1 MG/DL (ref 8.4–10.2)
CHLORIDE SERPL-SCNC: 105 MMOL/L (ref 96–108)
CO2 SERPL-SCNC: 27 MMOL/L (ref 21–32)
CREAT SERPL-MCNC: 0.74 MG/DL (ref 0.6–1.3)
EOSINOPHIL # BLD AUTO: 0.19 THOUSAND/ÂΜL (ref 0–0.61)
EOSINOPHIL NFR BLD AUTO: 3 % (ref 0–6)
ERYTHROCYTE [DISTWIDTH] IN BLOOD BY AUTOMATED COUNT: 12.3 % (ref 11.6–15.1)
GFR SERPL CREATININE-BSD FRML MDRD: 81 ML/MIN/1.73SQ M
GLUCOSE P FAST SERPL-MCNC: 87 MG/DL (ref 65–99)
HCT VFR BLD AUTO: 40.4 % (ref 34.8–46.1)
HGB BLD-MCNC: 13.4 G/DL (ref 11.5–15.4)
IMM GRANULOCYTES # BLD AUTO: 0.01 THOUSAND/UL (ref 0–0.2)
IMM GRANULOCYTES NFR BLD AUTO: 0 % (ref 0–2)
LYMPHOCYTES # BLD AUTO: 1.05 THOUSANDS/ÂΜL (ref 0.6–4.47)
LYMPHOCYTES NFR BLD AUTO: 16 % (ref 14–44)
MCH RBC QN AUTO: 31 PG (ref 26.8–34.3)
MCHC RBC AUTO-ENTMCNC: 33.2 G/DL (ref 31.4–37.4)
MCV RBC AUTO: 94 FL (ref 82–98)
MONOCYTES # BLD AUTO: 0.69 THOUSAND/ÂΜL (ref 0.17–1.22)
MONOCYTES NFR BLD AUTO: 11 % (ref 4–12)
NEUTROPHILS # BLD AUTO: 4.56 THOUSANDS/ÂΜL (ref 1.85–7.62)
NEUTS SEG NFR BLD AUTO: 69 % (ref 43–75)
NRBC BLD AUTO-RTO: 0 /100 WBCS
PLATELET # BLD AUTO: 281 THOUSANDS/UL (ref 149–390)
PMV BLD AUTO: 10 FL (ref 8.9–12.7)
POTASSIUM SERPL-SCNC: 4.5 MMOL/L (ref 3.5–5.3)
PROT SERPL-MCNC: 6.9 G/DL (ref 6.4–8.4)
RBC # BLD AUTO: 4.32 MILLION/UL (ref 3.81–5.12)
SODIUM SERPL-SCNC: 140 MMOL/L (ref 135–147)
WBC # BLD AUTO: 6.55 THOUSAND/UL (ref 4.31–10.16)

## 2025-04-24 PROCEDURE — 85025 COMPLETE CBC W/AUTO DIFF WBC: CPT

## 2025-04-24 PROCEDURE — 71046 X-RAY EXAM CHEST 2 VIEWS: CPT

## 2025-04-24 PROCEDURE — 36415 COLL VENOUS BLD VENIPUNCTURE: CPT

## 2025-04-24 PROCEDURE — 80053 COMPREHEN METABOLIC PANEL: CPT

## 2025-04-24 PROCEDURE — 93005 ELECTROCARDIOGRAM TRACING: CPT

## 2025-04-25 ENCOUNTER — OFFICE VISIT (OUTPATIENT)
Dept: SURGICAL ONCOLOGY | Facility: CLINIC | Age: 75
End: 2025-04-25
Payer: MEDICARE

## 2025-04-25 VITALS
DIASTOLIC BLOOD PRESSURE: 82 MMHG | TEMPERATURE: 97.6 F | WEIGHT: 191 LBS | OXYGEN SATURATION: 96 % | BODY MASS INDEX: 32.61 KG/M2 | HEART RATE: 72 BPM | SYSTOLIC BLOOD PRESSURE: 128 MMHG | HEIGHT: 64 IN | RESPIRATION RATE: 16 BRPM

## 2025-04-25 DIAGNOSIS — D36.9 INTRADUCTAL PAPILLOMA: Primary | ICD-10-CM

## 2025-04-25 DIAGNOSIS — Z01.818 PRE-OP EXAM: ICD-10-CM

## 2025-04-25 DIAGNOSIS — N64.89 BREAST HEMATOMA, RESOLVING: ICD-10-CM

## 2025-04-25 DIAGNOSIS — R92.8 ABNORMAL MAMMOGRAM: ICD-10-CM

## 2025-04-25 DIAGNOSIS — N64.89 RADIAL SCAR OF BREAST: ICD-10-CM

## 2025-04-25 DIAGNOSIS — N64.89 HEMATOMA OF LEFT BREAST: ICD-10-CM

## 2025-04-25 DIAGNOSIS — N64.89 HEMATOMA OF RIGHT BREAST: ICD-10-CM

## 2025-04-25 LAB
ATRIAL RATE: 67 BPM
P AXIS: 35 DEGREES
PR INTERVAL: 192 MS
QRS AXIS: -30 DEGREES
QRSD INTERVAL: 84 MS
QT INTERVAL: 406 MS
QTC INTERVAL: 429 MS
T WAVE AXIS: 2 DEGREES
VENTRICULAR RATE: 67 BPM

## 2025-04-25 PROCEDURE — 93010 ELECTROCARDIOGRAM REPORT: CPT | Performed by: INTERNAL MEDICINE

## 2025-04-25 PROCEDURE — 99214 OFFICE O/P EST MOD 30 MIN: CPT | Performed by: SURGERY

## 2025-04-25 NOTE — H&P (VIEW-ONLY)
Name: Laura David      : 1950      MRN: [id number]  Encounter Provider: Kinga Nesbitt MD  Encounter Date: 2025   Encounter department: [facility]  :  Assessment & Plan  Intraductal papilloma         Radial scar of breast         Abnormal mammogram         Pre-op exam         Breast hematoma, resolving         Hematoma of left breast         Hematoma of right breast         74-year-old female here for follow-up and evaluation of bilateral breast hematoma from retroareolar regional.  She has a bilateral breast intraductal papillomas and surgery has been scheduled.  Her hematoma has resolved and bruises are resolving.  She is scheduled for Gretel clip placement on May 2 for surgery.  Will proceed with surgery .  She has no further questions about surgery as we have consented before        History of Present Illness   Laura David is a 74 y.o. year old female who presents bilateral retroareolar intraductal papillomas..       Review of Systems   Constitutional:  Negative for chills and fever.   HENT:  Negative for ear pain and sore throat.    Eyes:  Negative for pain and visual disturbance.   Respiratory:  Negative for cough and shortness of breath.    Cardiovascular:  Negative for chest pain and palpitations.   Gastrointestinal:  Negative for abdominal pain and vomiting.   Genitourinary:  Negative for dysuria and hematuria.   Musculoskeletal:  Negative for arthralgias and back pain.   Skin:  Negative for color change and rash.   Neurological:  Negative for seizures and syncope.   All other systems reviewed and are negative.   A complete review of systems is negative other than that noted above in the HPI.    Medical History Reviewed by provider this encounter:  Tobacco  Allergies  Meds  Problems  Med Hx  Surg Hx  Fam Hx     .       Objective   /82 (Patient Position: Sitting, Cuff Size: Standard)   Pulse 72   Temp 97.6 °F (36.4 °C)  "(Temporal)   Resp 16   Ht 5' 4\" (1.626 m)   Wt 86.6 kg (191 lb)   SpO2 96%   BMI 32.79 kg/m²     Pain Screening:     ECOG    Physical Exam  Constitutional:       Appearance: Normal appearance.   HENT:      Head: Normocephalic and atraumatic.      Nose: Nose normal.      Mouth/Throat:      Mouth: Mucous membranes are moist.   Eyes:      Pupils: Pupils are equal, round, and reactive to light.   Cardiovascular:      Rate and Rhythm: Normal rate.      Pulses: Normal pulses.      Heart sounds: Normal heart sounds.   Pulmonary:      Effort: Pulmonary effort is normal.      Breath sounds: Normal breath sounds.   Chest:      Comments: Level bilateral breast nipple areolar complex hematoma is resolving.  Will proceed with surgery as scheduled  Abdominal:      General: Bowel sounds are normal.      Palpations: Abdomen is soft.   Musculoskeletal:         General: Normal range of motion.      Cervical back: Normal range of motion and neck supple.   Skin:     General: Skin is warm.   Neurological:      General: No focal deficit present.      Mental Status: She is alert and oriented to person, place, and time.   Psychiatric:         Mood and Affect: Mood normal.         Behavior: Behavior normal.         Thought Content: Thought content normal.         Judgment: Judgment normal.          Labs: I have reviewed pertinent labs.   Lab Results   Component Value Date/Time    WBC 6.55 04/24/2025 07:43 AM    RBC 4.32 04/24/2025 07:43 AM    Hemoglobin 13.4 04/24/2025 07:43 AM    Hematocrit 40.4 04/24/2025 07:43 AM    MCV 94 04/24/2025 07:43 AM    MCH 31.0 04/24/2025 07:43 AM    RDW 12.3 04/24/2025 07:43 AM    Platelets 281 04/24/2025 07:43 AM    Segmented % 69 04/24/2025 07:43 AM    Lymphocytes % 16 04/24/2025 07:43 AM    Monocytes % 11 04/24/2025 07:43 AM    Eosinophils Relative 3 04/24/2025 07:43 AM    Basophils Relative 1 04/24/2025 07:43 AM    Immature Grans % 0 04/24/2025 07:43 AM    Absolute Neutrophils 4.56 04/24/2025 07:43 AM "      Lab Results   Component Value Date/Time    Sodium 140 04/24/2025 07:43 AM    Potassium 4.5 04/24/2025 07:43 AM    Chloride 105 04/24/2025 07:43 AM    CO2 27 04/24/2025 07:43 AM    ANION GAP 8 04/24/2025 07:43 AM    BUN 17 04/24/2025 07:43 AM    Creatinine 0.74 04/24/2025 07:43 AM    Glucose, Fasting 87 04/24/2025 07:43 AM    Calcium 9.1 04/24/2025 07:43 AM    AST 23 04/24/2025 07:43 AM    ALT 20 04/24/2025 07:43 AM    Alkaline Phosphatase 72 04/24/2025 07:43 AM    Total Protein 6.9 04/24/2025 07:43 AM    Albumin 4.1 04/24/2025 07:43 AM    Total Bilirubin 0.87 04/24/2025 07:43 AM    eGFR 81 04/24/2025 07:43 AM      Office Visit on 04/24/2025   Component Date Value Ref Range Status    Ventricular Rate 04/24/2025 67  BPM Final    Atrial Rate 04/24/2025 67  BPM Final    AL Interval 04/24/2025 192  ms Final    QRSD Interval 04/24/2025 84  ms Final    QT Interval 04/24/2025 406  ms Final    QTC Interval 04/24/2025 429  ms Final    P Axis 04/24/2025 35  degrees Final    QRS Axis 04/24/2025 -30  degrees Final    T Wave [place] 04/24/2025 2  degrees Final   Appointment on 04/24/2025   Component Date Value Ref Range Status    WBC 04/24/2025 6.55  4.31 - 10.16 Thousand/uL Final    RBC 04/24/2025 4.32  3.81 - 5.12 Million/uL Final    Hemoglobin 04/24/2025 13.4  11.5 - 15.4 g/dL Final    Hematocrit 04/24/2025 40.4  34.8 - 46.1 % Final    MCV 04/24/2025 94  82 - 98 fL Final    MCH 04/24/2025 31.0  26.8 - 34.3 pg Final    MCHC 04/24/2025 33.2  31.4 - 37.4 g/dL Final    RDW 04/24/2025 12.3  11.6 - 15.1 % Final    MPV 04/24/2025 10.0  8.9 - 12.7 fL Final    Platelets 04/24/2025 281  149 - 390 Thousands/uL Final    nRBC 04/24/2025 0  /100 WBCs Final    Segmented % 04/24/2025 69  43 - 75 % Final    Immature Grans % 04/24/2025 0  0 - 2 % Final    Lymphocytes % 04/24/2025 16  14 - 44 % Final    Monocytes % 04/24/2025 11  4 - 12 % Final    Eosinophils Relative 04/24/2025 3  0 - 6 % Final    Basophils Relative 04/24/2025 1  0 - 1 %  Final    Absolute Neutrophils 04/24/2025 4.56  1.85 - 7.62 Thousands/µL Final    Absolute Immature Grans 04/24/2025 0.01  0.00 - 0.20 Thousand/uL Final    Absolute Lymphocytes 04/24/2025 1.05  0.60 - 4.47 Thousands/µL Final    Absolute Monocytes 04/24/2025 0.69  0.17 - 1.22 Thousand/µL Final    Eosinophils Absolute 04/24/2025 0.19  0.00 - 0.61 Thousand/µL Final    Basophils Absolute 04/24/2025 0.05  0.00 - 0.10 Thousands/µL Final    Sodium 04/24/2025 140  135 - 147 mmol/L Final    Potassium 04/24/2025 4.5  3.5 - 5.3 mmol/L Final    Chloride 04/24/2025 105  96 - 108 mmol/L Final    CO2 04/24/2025 27  21 - 32 mmol/L Final    ANION GAP 04/24/2025 8  4 - 13 mmol/L Final    BUN 04/24/2025 17  5 - 25 mg/dL Final    Creatinine 04/24/2025 0.74  0.60 - 1.30 mg/dL Final    Standardized to IDMS reference method    Glucose, Fasting 04/24/2025 87  65 - 99 mg/dL Final    Calcium 04/24/2025 9.1  8.4 - 10.2 mg/dL Final    AST 04/24/2025 23  13 - 39 U/L Final    ALT 04/24/2025 20  7 - 52 U/L Final    Specimen collection should occur prior to Sulfasalazine administration due to the potential for falsely depressed results.     Alkaline Phosphatase 04/24/2025 72  34 - 104 U/L Final    Total Protein 04/24/2025 6.9  6.4 - 8.4 g/dL Final    Albumin 04/24/2025 4.1  3.5 - 5.0 g/dL Final    Total Bilirubin 04/24/2025 0.87  0.20 - 1.00 mg/dL Final    Use of this assay is not recommended for patients undergoing treatment with eltrombopag due to the potential for falsely elevated results.  N-acetyl-p-benzoquinone imine (metabolite of Acetaminophen) will generate erroneously low results in samples for patients that have taken an overdose of Acetaminophen.    eGFR 04/24/2025 81  ml/min/1.73sq m Final   Hospital Outpatient Visit on 04/02/2025   Component Date Value Ref Range Status    Case Report 04/02/2025    Final                    Value:Surgical Pathology Report                         Case: [id number]                                   Authorizing Provider:  Kinga Nesbitt,  Collected:           04/02/2025 0901                                     MD                                                                           Ordering Location:     [facility] Breast Received:            04/02/2025 1908                                     Center                                                                       Pathologist:           Ralph Waters MD                                                         Specimens:   A) - Breast, Left, us bx lt breast retroareolar 5 passes 14g                                        B) - Breast, Right, us bx rt breast retroareolar 5 passes 14g                              Final Diagnosis 04/02/2025    Final                    Value:A. Breast, Left, us bx lt breast retroareolar 5 passes 14g:      - Portion of intraductal papilloma (1.5 mm in greatest dimension), see note       - Separate portion of breast parenchyma with features consistent with radial scar/radial sclerosing lesion      - Negative for malignancy       B. Breast, Right, us bx rt breast retroareolar 5 passes 14g:      - Portion of intraductal papilloma (3 mm in greatest dimension), see note       - Negative for malignancy          Note 04/02/2025    Final                    Value:On blocks A2-A3 and B2-B3, immunohistochemical stains for p63, SMMHC, and Calponin are all positive supporting the diagnosis.       Additional Information 04/02/2025    Final                    Value:Interpretation performed at [facility] [address] [place] [id number]    All reported additional testing was performed with appropriately reactive controls.  These tests were developed and their performance characteristics determined by [facility] or appropriate performing facility, though some tests may be performed on tissues which have not been validated for performance characteristics (such as staining performed on  "alcohol exposed cell blocks and decalcified tissues).  Results should be interpreted with caution and in the context of the patients’ clinical condition. These tests may not be cleared or approved by the U.S. Food and Drug Administration, though the FDA has determined that such clearance or approval is not necessary. These tests are used for clinical purposes and they should not be regarded as investigational or for research. This laboratory has been approved by [place] 88, designated as a high-complexity laboratory and is qualified to                           perform these tests.  .      Gross Description 04/02/2025    Final                    Value:A. The specimen is received in formalin, labeled with the patient's name and hospital number, and is designated \" left breast ultrasound biopsy retroareolar, 5 passes, 14-gauge\".  The specimen consists of 6 yellow fatty and friable tissue cores measuring less than 0.1-0.2 cm in diameter and ranging from 0.5-1.2 cm in length.  The specimen is entirely submitted between sponges, 3 cassettes.  B. The specimen is received in formalin, labeled with the patient's name and hospital number, and is designated \" right breast ultrasound biopsy retroareolar 5 passes, 14-gauge\".  The specimen consists of 6 yellow fatty and friable tissue cores measuring less than 0.1-0.2 cm in diameter and ranging from 0.5-1.6 cm in length.  The specimen is entirely submitted between sponges, 3 cassettes.    Note: The estimated total formalin fixation time based upon information provided by the submitting clinician and the standard processing schedule is 14 hours.  The cold ischemia time based upon information                           provided by the submitting clinician and receiving staff in the laboratory is within 1 minute.       -Tor Brandon      Clinical Information 04/02/2025    Final                    Value:FINDINGS:   LEFT  1) MASS [E]  US guidance breast biopsy left each additional: " Images of the left breast mass in the retroareolar region were obtained. The patient was placed supine on the biopsy table. A core needle biopsy was performed under ultrasound guidance using a lateral approach. The skin was prepped in the usual fashion. Anesthesia was administered using 5 mL of lidocaine 1%. A 14 G core needle biopsy device was advanced. Using the device, 5 samples were collected.   An open coil clip was inserted into the target area. The patient tolerated the procedure well. There were no immediate complications.      RIGHT  2) SOLITARY DILATED DUCT [D]  US guided breast biopsy right complete: Images of the right breast solitary dilated duct in the retroareolar region in the middle portion were obtained. The patient was placed supine on the biopsy table. A core needle biopsy was performed under ultrasound guidance using a medial approach. The skin was prepped in the usual fashion.                           Anesthesia was administered using 5 mL of lidocaine 1%. A 14 G core needle biopsy device was advanced. Using the device, 5 samples were collected.   A butterfly clip was inserted into the target area. The patient tolerated the procedure well. There were no immediate complications.       IMPRESSION:   Left breast: Successful ultrasound-guided biopsy of the retroareolar breast marked with an open coil.  Pathology pending.  Right breast: Successful ultrasound-guided biopsy of the right breast ductal retroareolar marked with a butterfly clip.  Pathology pending.       Pathology: I have reviewed pathology reports described above.    Radiology Results Review : No pertinent imaging studies reviewed.  Concordance: yes    Administrative Statements   I have spent a total time of 30 minutes in caring for this patient on the day of the visit/encounter including Prognosis, Instructions for management, Patient and family education, Importance of tx compliance, Risk factor reductions, Impressions, Counseling /  Coordination of care, Documenting in the medical record, Reviewing/placing orders in the medical record (including tests, medications, and/or procedures), and Obtaining or reviewing history  .

## 2025-04-25 NOTE — PROGRESS NOTES
Name: Laura David      : 1950      MRN: [id number]  Encounter Provider: Kinga Nesbitt MD  Encounter Date: 2025   Encounter department: [facility]  :  Assessment & Plan  Intraductal papilloma         Radial scar of breast         Abnormal mammogram         Pre-op exam         Breast hematoma, resolving         Hematoma of left breast         Hematoma of right breast         74-year-old female here for follow-up and evaluation of bilateral breast hematoma from retroareolar regional.  She has a bilateral breast intraductal papillomas and surgery has been scheduled.  Her hematoma has resolved and bruises are resolving.  She is scheduled for Gretel clip placement on May 2 for surgery.  Will proceed with surgery .  She has no further questions about surgery as we have consented before        History of Present Illness   Laura David is a 74 y.o. year old female who presents bilateral retroareolar intraductal papillomas..       Review of Systems   Constitutional:  Negative for chills and fever.   HENT:  Negative for ear pain and sore throat.    Eyes:  Negative for pain and visual disturbance.   Respiratory:  Negative for cough and shortness of breath.    Cardiovascular:  Negative for chest pain and palpitations.   Gastrointestinal:  Negative for abdominal pain and vomiting.   Genitourinary:  Negative for dysuria and hematuria.   Musculoskeletal:  Negative for arthralgias and back pain.   Skin:  Negative for color change and rash.   Neurological:  Negative for seizures and syncope.   All other systems reviewed and are negative.   A complete review of systems is negative other than that noted above in the HPI.    Medical History Reviewed by provider this encounter:  Tobacco  Allergies  Meds  Problems  Med Hx  Surg Hx  Fam Hx     .       Objective   /82 (Patient Position: Sitting, Cuff Size: Standard)   Pulse 72   Temp 97.6 °F (36.4 °C)  "(Temporal)   Resp 16   Ht 5' 4\" (1.626 m)   Wt 86.6 kg (191 lb)   SpO2 96%   BMI 32.79 kg/m²     Pain Screening:     ECOG    Physical Exam  Constitutional:       Appearance: Normal appearance.   HENT:      Head: Normocephalic and atraumatic.      Nose: Nose normal.      Mouth/Throat:      Mouth: Mucous membranes are moist.   Eyes:      Pupils: Pupils are equal, round, and reactive to light.   Cardiovascular:      Rate and Rhythm: Normal rate.      Pulses: Normal pulses.      Heart sounds: Normal heart sounds.   Pulmonary:      Effort: Pulmonary effort is normal.      Breath sounds: Normal breath sounds.   Chest:      Comments: Level bilateral breast nipple areolar complex hematoma is resolving.  Will proceed with surgery as scheduled  Abdominal:      General: Bowel sounds are normal.      Palpations: Abdomen is soft.   Musculoskeletal:         General: Normal range of motion.      Cervical back: Normal range of motion and neck supple.   Skin:     General: Skin is warm.   Neurological:      General: No focal deficit present.      Mental Status: She is alert and oriented to person, place, and time.   Psychiatric:         Mood and Affect: Mood normal.         Behavior: Behavior normal.         Thought Content: Thought content normal.         Judgment: Judgment normal.          Labs: I have reviewed pertinent labs.   Lab Results   Component Value Date/Time    WBC 6.55 04/24/2025 07:43 AM    RBC 4.32 04/24/2025 07:43 AM    Hemoglobin 13.4 04/24/2025 07:43 AM    Hematocrit 40.4 04/24/2025 07:43 AM    MCV 94 04/24/2025 07:43 AM    MCH 31.0 04/24/2025 07:43 AM    RDW 12.3 04/24/2025 07:43 AM    Platelets 281 04/24/2025 07:43 AM    Segmented % 69 04/24/2025 07:43 AM    Lymphocytes % 16 04/24/2025 07:43 AM    Monocytes % 11 04/24/2025 07:43 AM    Eosinophils Relative 3 04/24/2025 07:43 AM    Basophils Relative 1 04/24/2025 07:43 AM    Immature Grans % 0 04/24/2025 07:43 AM    Absolute Neutrophils 4.56 04/24/2025 07:43 AM "      Lab Results   Component Value Date/Time    Sodium 140 04/24/2025 07:43 AM    Potassium 4.5 04/24/2025 07:43 AM    Chloride 105 04/24/2025 07:43 AM    CO2 27 04/24/2025 07:43 AM    ANION GAP 8 04/24/2025 07:43 AM    BUN 17 04/24/2025 07:43 AM    Creatinine 0.74 04/24/2025 07:43 AM    Glucose, Fasting 87 04/24/2025 07:43 AM    Calcium 9.1 04/24/2025 07:43 AM    AST 23 04/24/2025 07:43 AM    ALT 20 04/24/2025 07:43 AM    Alkaline Phosphatase 72 04/24/2025 07:43 AM    Total Protein 6.9 04/24/2025 07:43 AM    Albumin 4.1 04/24/2025 07:43 AM    Total Bilirubin 0.87 04/24/2025 07:43 AM    eGFR 81 04/24/2025 07:43 AM      Office Visit on 04/24/2025   Component Date Value Ref Range Status    Ventricular Rate 04/24/2025 67  BPM Final    Atrial Rate 04/24/2025 67  BPM Final    NE Interval 04/24/2025 192  ms Final    QRSD Interval 04/24/2025 84  ms Final    QT Interval 04/24/2025 406  ms Final    QTC Interval 04/24/2025 429  ms Final    P Axis 04/24/2025 35  degrees Final    QRS Axis 04/24/2025 -30  degrees Final    T Wave [place] 04/24/2025 2  degrees Final   Appointment on 04/24/2025   Component Date Value Ref Range Status    WBC 04/24/2025 6.55  4.31 - 10.16 Thousand/uL Final    RBC 04/24/2025 4.32  3.81 - 5.12 Million/uL Final    Hemoglobin 04/24/2025 13.4  11.5 - 15.4 g/dL Final    Hematocrit 04/24/2025 40.4  34.8 - 46.1 % Final    MCV 04/24/2025 94  82 - 98 fL Final    MCH 04/24/2025 31.0  26.8 - 34.3 pg Final    MCHC 04/24/2025 33.2  31.4 - 37.4 g/dL Final    RDW 04/24/2025 12.3  11.6 - 15.1 % Final    MPV 04/24/2025 10.0  8.9 - 12.7 fL Final    Platelets 04/24/2025 281  149 - 390 Thousands/uL Final    nRBC 04/24/2025 0  /100 WBCs Final    Segmented % 04/24/2025 69  43 - 75 % Final    Immature Grans % 04/24/2025 0  0 - 2 % Final    Lymphocytes % 04/24/2025 16  14 - 44 % Final    Monocytes % 04/24/2025 11  4 - 12 % Final    Eosinophils Relative 04/24/2025 3  0 - 6 % Final    Basophils Relative 04/24/2025 1  0 - 1 %  Final    Absolute Neutrophils 04/24/2025 4.56  1.85 - 7.62 Thousands/µL Final    Absolute Immature Grans 04/24/2025 0.01  0.00 - 0.20 Thousand/uL Final    Absolute Lymphocytes 04/24/2025 1.05  0.60 - 4.47 Thousands/µL Final    Absolute Monocytes 04/24/2025 0.69  0.17 - 1.22 Thousand/µL Final    Eosinophils Absolute 04/24/2025 0.19  0.00 - 0.61 Thousand/µL Final    Basophils Absolute 04/24/2025 0.05  0.00 - 0.10 Thousands/µL Final    Sodium 04/24/2025 140  135 - 147 mmol/L Final    Potassium 04/24/2025 4.5  3.5 - 5.3 mmol/L Final    Chloride 04/24/2025 105  96 - 108 mmol/L Final    CO2 04/24/2025 27  21 - 32 mmol/L Final    ANION GAP 04/24/2025 8  4 - 13 mmol/L Final    BUN 04/24/2025 17  5 - 25 mg/dL Final    Creatinine 04/24/2025 0.74  0.60 - 1.30 mg/dL Final    Standardized to IDMS reference method    Glucose, Fasting 04/24/2025 87  65 - 99 mg/dL Final    Calcium 04/24/2025 9.1  8.4 - 10.2 mg/dL Final    AST 04/24/2025 23  13 - 39 U/L Final    ALT 04/24/2025 20  7 - 52 U/L Final    Specimen collection should occur prior to Sulfasalazine administration due to the potential for falsely depressed results.     Alkaline Phosphatase 04/24/2025 72  34 - 104 U/L Final    Total Protein 04/24/2025 6.9  6.4 - 8.4 g/dL Final    Albumin 04/24/2025 4.1  3.5 - 5.0 g/dL Final    Total Bilirubin 04/24/2025 0.87  0.20 - 1.00 mg/dL Final    Use of this assay is not recommended for patients undergoing treatment with eltrombopag due to the potential for falsely elevated results.  N-acetyl-p-benzoquinone imine (metabolite of Acetaminophen) will generate erroneously low results in samples for patients that have taken an overdose of Acetaminophen.    eGFR 04/24/2025 81  ml/min/1.73sq m Final   Hospital Outpatient Visit on 04/02/2025   Component Date Value Ref Range Status    Case Report 04/02/2025    Final                    Value:Surgical Pathology Report                         Case: [id number]                                   Authorizing Provider:  Kinga Nesbitt,  Collected:           04/02/2025 0901                                     MD                                                                           Ordering Location:     [facility] Breast Received:            04/02/2025 1908                                     Center                                                                       Pathologist:           Ralph Waters MD                                                         Specimens:   A) - Breast, Left, us bx lt breast retroareolar 5 passes 14g                                        B) - Breast, Right, us bx rt breast retroareolar 5 passes 14g                              Final Diagnosis 04/02/2025    Final                    Value:A. Breast, Left, us bx lt breast retroareolar 5 passes 14g:      - Portion of intraductal papilloma (1.5 mm in greatest dimension), see note       - Separate portion of breast parenchyma with features consistent with radial scar/radial sclerosing lesion      - Negative for malignancy       B. Breast, Right, us bx rt breast retroareolar 5 passes 14g:      - Portion of intraductal papilloma (3 mm in greatest dimension), see note       - Negative for malignancy          Note 04/02/2025    Final                    Value:On blocks A2-A3 and B2-B3, immunohistochemical stains for p63, SMMHC, and Calponin are all positive supporting the diagnosis.       Additional Information 04/02/2025    Final                    Value:Interpretation performed at [facility] [address] [place] [id number]    All reported additional testing was performed with appropriately reactive controls.  These tests were developed and their performance characteristics determined by [facility] or appropriate performing facility, though some tests may be performed on tissues which have not been validated for performance characteristics (such as staining performed on  "alcohol exposed cell blocks and decalcified tissues).  Results should be interpreted with caution and in the context of the patients’ clinical condition. These tests may not be cleared or approved by the U.S. Food and Drug Administration, though the FDA has determined that such clearance or approval is not necessary. These tests are used for clinical purposes and they should not be regarded as investigational or for research. This laboratory has been approved by [place] 88, designated as a high-complexity laboratory and is qualified to                           perform these tests.  .      Gross Description 04/02/2025    Final                    Value:A. The specimen is received in formalin, labeled with the patient's name and hospital number, and is designated \" left breast ultrasound biopsy retroareolar, 5 passes, 14-gauge\".  The specimen consists of 6 yellow fatty and friable tissue cores measuring less than 0.1-0.2 cm in diameter and ranging from 0.5-1.2 cm in length.  The specimen is entirely submitted between sponges, 3 cassettes.  B. The specimen is received in formalin, labeled with the patient's name and hospital number, and is designated \" right breast ultrasound biopsy retroareolar 5 passes, 14-gauge\".  The specimen consists of 6 yellow fatty and friable tissue cores measuring less than 0.1-0.2 cm in diameter and ranging from 0.5-1.6 cm in length.  The specimen is entirely submitted between sponges, 3 cassettes.    Note: The estimated total formalin fixation time based upon information provided by the submitting clinician and the standard processing schedule is 14 hours.  The cold ischemia time based upon information                           provided by the submitting clinician and receiving staff in the laboratory is within 1 minute.       -Tor Brandon      Clinical Information 04/02/2025    Final                    Value:FINDINGS:   LEFT  1) MASS [E]  US guidance breast biopsy left each additional: " Images of the left breast mass in the retroareolar region were obtained. The patient was placed supine on the biopsy table. A core needle biopsy was performed under ultrasound guidance using a lateral approach. The skin was prepped in the usual fashion. Anesthesia was administered using 5 mL of lidocaine 1%. A 14 G core needle biopsy device was advanced. Using the device, 5 samples were collected.   An open coil clip was inserted into the target area. The patient tolerated the procedure well. There were no immediate complications.      RIGHT  2) SOLITARY DILATED DUCT [D]  US guided breast biopsy right complete: Images of the right breast solitary dilated duct in the retroareolar region in the middle portion were obtained. The patient was placed supine on the biopsy table. A core needle biopsy was performed under ultrasound guidance using a medial approach. The skin was prepped in the usual fashion.                           Anesthesia was administered using 5 mL of lidocaine 1%. A 14 G core needle biopsy device was advanced. Using the device, 5 samples were collected.   A butterfly clip was inserted into the target area. The patient tolerated the procedure well. There were no immediate complications.       IMPRESSION:   Left breast: Successful ultrasound-guided biopsy of the retroareolar breast marked with an open coil.  Pathology pending.  Right breast: Successful ultrasound-guided biopsy of the right breast ductal retroareolar marked with a butterfly clip.  Pathology pending.       Pathology: I have reviewed pathology reports described above.    Radiology Results Review : No pertinent imaging studies reviewed.  Concordance: yes    Administrative Statements   I have spent a total time of 30 minutes in caring for this patient on the day of the visit/encounter including Prognosis, Instructions for management, Patient and family education, Importance of tx compliance, Risk factor reductions, Impressions, Counseling /  Coordination of care, Documenting in the medical record, Reviewing/placing orders in the medical record (including tests, medications, and/or procedures), and Obtaining or reviewing history  .

## 2025-04-30 ENCOUNTER — HOSPITAL ENCOUNTER (OUTPATIENT)
Dept: RADIOLOGY | Age: 75
Discharge: HOME | End: 2025-04-30
Payer: MEDICARE

## 2025-04-30 VITALS — BODY MASS INDEX: 32.1 KG/M2 | HEIGHT: 64 IN | WEIGHT: 188 LBS

## 2025-04-30 DIAGNOSIS — Z78.0 ASYMPTOMATIC POSTMENOPAUSAL STATE: ICD-10-CM

## 2025-04-30 PROCEDURE — 77080 DXA BONE DENSITY AXIAL: CPT

## 2025-05-02 ENCOUNTER — HOSPITAL ENCOUNTER (OUTPATIENT)
Dept: ULTRASOUND IMAGING | Facility: CLINIC | Age: 75
Discharge: HOME | End: 2025-05-02
Attending: SURGERY
Payer: MEDICARE

## 2025-05-02 ENCOUNTER — HOSPITAL ENCOUNTER (OUTPATIENT)
Dept: MAMMOGRAPHY | Facility: CLINIC | Age: 75
Discharge: HOME | End: 2025-05-02
Payer: MEDICARE

## 2025-05-02 VITALS — DIASTOLIC BLOOD PRESSURE: 78 MMHG | SYSTOLIC BLOOD PRESSURE: 125 MMHG | HEART RATE: 71 BPM

## 2025-05-02 DIAGNOSIS — N64.89 RADIAL SCAR OF BREAST: ICD-10-CM

## 2025-05-02 DIAGNOSIS — D36.9 INTRADUCTAL PAPILLOMA: ICD-10-CM

## 2025-05-02 PROCEDURE — 19286 PERQ DEV BREAST ADD US IMAG: CPT

## 2025-05-02 PROCEDURE — 19285 PERQ DEV BREAST 1ST US IMAG: CPT

## 2025-05-02 RX ORDER — LIDOCAINE HYDROCHLORIDE 10 MG/ML
5 INJECTION, SOLUTION EPIDURAL; INFILTRATION; INTRACAUDAL; PERINEURAL ONCE
Status: COMPLETED | OUTPATIENT
Start: 2025-05-02 | End: 2025-05-02

## 2025-05-02 RX ADMIN — LIDOCAINE HYDROCHLORIDE 5 ML: 10 INJECTION, SOLUTION EPIDURAL; INFILTRATION; INTRACAUDAL; PERINEURAL at 13:23

## 2025-05-02 RX ADMIN — LIDOCAINE HYDROCHLORIDE 5 ML: 10 INJECTION, SOLUTION EPIDURAL; INFILTRATION; INTRACAUDAL; PERINEURAL at 13:26

## 2025-05-02 NOTE — PROGRESS NOTES
Procedure type: Site # 1 Clip Placement    __x___ultrasound guided _____stereotactic    Breast:    _____Left ___x__Right    Location: Retro    Needle: 16G    # of passes: 1    Clip: Gretel      Procedure type: Site # 2 Clip Placement    ___x__ultrasound guided _____stereotactic    Breast:    _x____Left _____Right    Location: Retro    Needle: 16G    # of passes: 1    Clip: Gretel      Performed by: Dr. Pierce    Pressure held for 2 minutes by: Sujey Gonzalez Strips:    _____yes ___x__no    Band aid x 2    __X___yes_____no    Tolerated procedure:    __X___yes _____no

## 2025-05-05 NOTE — PROGRESS NOTES
Post procedure call completed    Bleeding: _____yes _x____no    Pain: _____yes ___x___no    Redness/Swelling: ______yes __x____no    Band aid removed: _x____yes _____no

## 2025-05-09 ENCOUNTER — ANESTHESIA EVENT (OUTPATIENT)
Dept: PERIOP | Facility: HOSPITAL | Age: 75
End: 2025-05-09
Payer: MEDICARE

## 2025-05-09 NOTE — PRE-PROCEDURE INSTRUCTIONS
Pre-Surgery Instructions:   Medication Instructions    atorvastatin (LIPITOR) 10 mg tablet Take night before surgery    Calcium-Magnesium-Vitamin D (CALCIUM MAGNESIUM PO) Stop taking 7 days prior to surgery.    cholecalciferol (VITAMIN D3) 04952 units capsule Stop taking 7 days prior to surgery.    ezetimibe (ZETIA) 10 mg tablet Take night before surgery    meloxicam (MOBIC) 15 mg tablet Stop taking 3 days prior to surgery.    Multiple Vitamin (MULTIVITAMINS PO) Stop taking 7 days prior to surgery.      Medication instructions for day of surgery reviewed. Please take all instructed medications with only a sip of water.       You will receive a call one business day prior to surgery with an arrival time and hospital directions. If your surgery is scheduled on a Monday, the hospital will be calling you on the Friday prior to your surgery. If you have not heard from anyone by 8pm, please call the hospital supervisor through the hospital  at [phone]. ([place] [phone] or [place] [phone]).    Do not eat or drink anything after midnight the night before your surgery, including candy, mints, lifesavers, or chewing gum. Do not drink alcohol 24hrs before your surgery. Try not to smoke at least 24hrs before your surgery.       Follow the pre surgery showering instructions as listed in the “My Surgical Experience Booklet” or otherwise provided by your surgeon's office. Do not use a blade to shave the surgical area 1 week before surgery. It is okay to use a clean electric clippers up to 24 hours before surgery. Do not apply any lotions, creams, including makeup, cologne, deodorant, or perfumes after showering on the day of your surgery. Do not use dry shampoo, hair spray, hair gel, or any type of hair products.     No contact lenses, eye make-up, or artificial eyelashes. Remove nail polish, including gel polish, and any artificial, gel, or acrylic nails if possible. Remove all jewelry including rings and  body piercing jewelry.     Wear causal clothing that is easy to take on and off. Consider your type of surgery.    Keep any valuables, jewelry, piercings at home. Please bring any specially ordered equipment (sling, braces) if indicated.    Arrange for a responsible person to drive you to and from the hospital on the day of your surgery. Please confirm the visitor policy for the day of your procedure when you receive your phone call with an arrival time.     Call the surgeon's office with any new illnesses, exposures, or additional questions prior to surgery.    Please reference your “My Surgical Experience Booklet” for additional information to prepare for your upcoming surgery.

## 2025-05-20 ENCOUNTER — APPOINTMENT (OUTPATIENT)
Dept: RADIOLOGY | Facility: HOSPITAL | Age: 75
End: 2025-05-20
Payer: MEDICARE

## 2025-05-20 ENCOUNTER — ANESTHESIA (OUTPATIENT)
Dept: PERIOP | Facility: HOSPITAL | Age: 75
End: 2025-05-20
Payer: MEDICARE

## 2025-05-20 ENCOUNTER — HOSPITAL ENCOUNTER (OUTPATIENT)
Facility: HOSPITAL | Age: 75
Setting detail: OUTPATIENT SURGERY
Discharge: HOME | End: 2025-05-20
Attending: SURGERY | Admitting: SURGERY
Payer: MEDICARE

## 2025-05-20 VITALS
RESPIRATION RATE: 29 BRPM | SYSTOLIC BLOOD PRESSURE: 147 MMHG | HEIGHT: 64 IN | HEART RATE: 69 BPM | WEIGHT: 188.93 LBS | OXYGEN SATURATION: 98 % | DIASTOLIC BLOOD PRESSURE: 68 MMHG | TEMPERATURE: 97.3 F | BODY MASS INDEX: 32.26 KG/M2

## 2025-05-20 DIAGNOSIS — D36.9 INTRADUCTAL PAPILLOMA: ICD-10-CM

## 2025-05-20 PROCEDURE — 88307 TISSUE EXAM BY PATHOLOGIST: CPT | Performed by: STUDENT IN AN ORGANIZED HEALTH CARE EDUCATION/TRAINING PROGRAM

## 2025-05-20 RX ORDER — LIDOCAINE HYDROCHLORIDE 10 MG/ML
INJECTION, SOLUTION EPIDURAL; INFILTRATION; INTRACAUDAL; PERINEURAL AS NEEDED
Status: DISCONTINUED | OUTPATIENT
Start: 2025-05-20 | End: 2025-05-20

## 2025-05-20 RX ORDER — PROPOFOL 10 MG/ML
INJECTION, EMULSION INTRAVENOUS AS NEEDED
Status: DISCONTINUED | OUTPATIENT
Start: 2025-05-20 | End: 2025-05-20

## 2025-05-20 RX ORDER — FENTANYL CITRATE/PF 50 MCG/ML
50 SYRINGE (ML) INJECTION
Status: DISCONTINUED | OUTPATIENT
Start: 2025-05-20 | End: 2025-05-20 | Stop reason: HOSPADM

## 2025-05-20 RX ORDER — ONDANSETRON 2 MG/ML
4 INJECTION INTRAMUSCULAR; INTRAVENOUS ONCE AS NEEDED
Status: DISCONTINUED | OUTPATIENT
Start: 2025-05-20 | End: 2025-05-20 | Stop reason: HOSPADM

## 2025-05-20 RX ORDER — PHENYLEPHRINE HCL IN 0.9% NACL 1 MG/10 ML
SYRINGE (ML) INTRAVENOUS AS NEEDED
Status: DISCONTINUED | OUTPATIENT
Start: 2025-05-20 | End: 2025-05-20

## 2025-05-20 RX ORDER — FENTANYL CITRATE 50 UG/ML
INJECTION, SOLUTION INTRAMUSCULAR; INTRAVENOUS AS NEEDED
Status: DISCONTINUED | OUTPATIENT
Start: 2025-05-20 | End: 2025-05-20

## 2025-05-20 RX ORDER — HYDROMORPHONE HCL/PF 1 MG/ML
0.5 SYRINGE (ML) INJECTION
Status: DISCONTINUED | OUTPATIENT
Start: 2025-05-20 | End: 2025-05-20 | Stop reason: HOSPADM

## 2025-05-20 RX ORDER — EPHEDRINE SULFATE 50 MG/ML
INJECTION INTRAVENOUS AS NEEDED
Status: DISCONTINUED | OUTPATIENT
Start: 2025-05-20 | End: 2025-05-20

## 2025-05-20 RX ORDER — DEXAMETHASONE SODIUM PHOSPHATE 10 MG/ML
INJECTION, SOLUTION INTRAMUSCULAR; INTRAVENOUS AS NEEDED
Status: DISCONTINUED | OUTPATIENT
Start: 2025-05-20 | End: 2025-05-20

## 2025-05-20 RX ORDER — ONDANSETRON 2 MG/ML
INJECTION INTRAMUSCULAR; INTRAVENOUS AS NEEDED
Status: DISCONTINUED | OUTPATIENT
Start: 2025-05-20 | End: 2025-05-20

## 2025-05-20 RX ORDER — SODIUM CHLORIDE, SODIUM LACTATE, POTASSIUM CHLORIDE, CALCIUM CHLORIDE 600; 310; 30; 20 MG/100ML; MG/100ML; MG/100ML; MG/100ML
INJECTION, SOLUTION INTRAVENOUS CONTINUOUS PRN
Status: DISCONTINUED | OUTPATIENT
Start: 2025-05-20 | End: 2025-05-20

## 2025-05-20 RX ORDER — CEFAZOLIN SODIUM 2 G/50ML
2000 SOLUTION INTRAVENOUS ONCE
Status: COMPLETED | OUTPATIENT
Start: 2025-05-20 | End: 2025-05-20

## 2025-05-20 RX ORDER — MIDAZOLAM HYDROCHLORIDE 2 MG/2ML
INJECTION, SOLUTION INTRAMUSCULAR; INTRAVENOUS AS NEEDED
Status: DISCONTINUED | OUTPATIENT
Start: 2025-05-20 | End: 2025-05-20

## 2025-05-20 RX ADMIN — Medication 200 MCG: at 09:06

## 2025-05-20 RX ADMIN — PROPOFOL 150 MG: 10 INJECTION, EMULSION INTRAVENOUS at 08:05

## 2025-05-20 RX ADMIN — PROPOFOL 150 MCG/KG/MIN: 10 INJECTION, EMULSION INTRAVENOUS at 08:08

## 2025-05-20 RX ADMIN — Medication 200 MCG: at 08:12

## 2025-05-20 RX ADMIN — Medication 200 MCG: at 08:38

## 2025-05-20 RX ADMIN — Medication 200 MCG: at 08:18

## 2025-05-20 RX ADMIN — LIDOCAINE HYDROCHLORIDE 50 MG: 10 INJECTION, SOLUTION EPIDURAL; INFILTRATION; INTRACAUDAL; PERINEURAL at 08:05

## 2025-05-20 RX ADMIN — FENTANYL CITRATE 50 MCG: 50 INJECTION, SOLUTION INTRAMUSCULAR; INTRAVENOUS at 08:05

## 2025-05-20 RX ADMIN — DEXAMETHASONE SODIUM PHOSPHATE 10 MG: 10 INJECTION INTRAMUSCULAR; INTRAVENOUS at 08:05

## 2025-05-20 RX ADMIN — SODIUM CHLORIDE, SODIUM LACTATE, POTASSIUM CHLORIDE, AND CALCIUM CHLORIDE: .6; .31; .03; .02 INJECTION, SOLUTION INTRAVENOUS at 06:55

## 2025-05-20 RX ADMIN — PROPOFOL 50 MG: 10 INJECTION, EMULSION INTRAVENOUS at 09:18

## 2025-05-20 RX ADMIN — ONDANSETRON 4 MG: 2 INJECTION INTRAMUSCULAR; INTRAVENOUS at 08:05

## 2025-05-20 RX ADMIN — EPHEDRINE SULFATE 10 MG: 50 INJECTION, SOLUTION INTRAVENOUS at 08:29

## 2025-05-20 RX ADMIN — Medication 200 MCG: at 09:08

## 2025-05-20 RX ADMIN — CEFAZOLIN SODIUM 2000 MG: 2 SOLUTION INTRAVENOUS at 08:02

## 2025-05-20 RX ADMIN — FENTANYL CITRATE 50 MCG: 50 INJECTION, SOLUTION INTRAMUSCULAR; INTRAVENOUS at 08:51

## 2025-05-20 RX ADMIN — MIDAZOLAM HYDROCHLORIDE 2 MG: 1 INJECTION, SOLUTION INTRAMUSCULAR; INTRAVENOUS at 08:01

## 2025-05-20 NOTE — ANESTHESIA PREPROCEDURE EVALUATION
Procedure:  RIGHT AND LEFT BREAST CLARK  DIRECTED LUMPECTOMY (Bilateral: Breast)    Relevant Problems   ANESTHESIA (within normal limits)      CARDIO   (+) Hyperlipidemia   (-) MI (myocardial infarction) (HCC)      ENDO (within normal limits)      GI/HEPATIC (within normal limits)  NPO confirmed  BMI 32.4      /RENAL (within normal limits)      HEMATOLOGY (within normal limits)      NEURO/PSYCH (within normal limits)   (-) CVA (cerebral vascular accident) (HCC)   (-) Seizures (HCC)      PULMONARY (within normal limits)   (-) Sleep apnea   (-) URI (upper respiratory infection)     Allergies   Allergen Reactions    Ezetimibe-Simvastatin Headache    Pravastatin Myalgia    Rosuvastatin Myalgia    Simvastatin Other (See Comments)     Muscle Aches     Social History[1]  Current Outpatient Medications   Medication Instructions    acetaminophen-codeine (TYLENOL with CODEINE #3) 300-30 MG per tablet 1 tablet, Oral, Every 6 hours PRN    atorvastatin (LIPITOR) 10 mg, Oral, Daily    Calcium-Magnesium-Vitamin D (CALCIUM MAGNESIUM PO) 1 tablet, Oral, Daily    cholecalciferol (VITAMIN D3) 72605 units capsule Oral    ezetimibe (ZETIA) 10 mg, Oral, Daily    meloxicam (MOBIC) 15 mg, Oral, Daily    Multiple Vitamin (MULTIVITAMINS PO) 1 tablet, Oral, Daily     Lab Results   Component Value Date    WBC 6.55 04/24/2025    HGB 13.4 04/24/2025    HCT 40.4 04/24/2025     04/24/2025    SODIUM 140 04/24/2025    K 4.5 04/24/2025     04/24/2025    CO2 27 04/24/2025    BUN 17 04/24/2025    CREATININE 0.74 04/24/2025    GLUC 87 01/09/2017    AST 23 04/24/2025    ALT 20 04/24/2025    ALKPHOS 72 04/24/2025    TBILI 0.87 04/24/2025    ALB 4.1 04/24/2025     Vitals:    05/20/25 0646   BP: 139/68   Pulse: 71   Resp: 16   Temp: (!) 97.4 °F (36.3 °C)   SpO2: 98%     EKG 4/24/25  Normal sinus rhythm  Left axis deviation  Abnormal ECG  No previous ECGs available     Physical Exam    Airway     Mallampati score: II  TM Distance: >3  FB  Neck ROM: full  Mouth opening: >= 4 cm      Cardiovascular  Rhythm: regular, Rate: normalCardiovascular exam normal    Dental   Comment: Denies loose/chipped teeth, No notable dental hx     Pulmonary  Pulmonary exam normal Breath sounds clear to auscultation    Neurological      Other Findings  post-pubertal.      Anesthesia Plan  ASA Score- 2     Anesthesia Type- general with ASA Monitors.         Additional Monitors:     Airway Plan: LMA and LMA.           Plan Factors-Exercise tolerance (METS): >4 METS.    Chart reviewed. EKG reviewed.  Existing labs reviewed. Patient summary reviewed.    Patient is not a current smoker.              Induction- intravenous.    Postoperative Plan- Plan for postoperative opioid use.   Monitoring Plan - Monitoring plan - standard ASA monitoring  Post Operative Pain Plan - plan for postoperative opioid use and multimodal analgesia    Perioperative Resuscitation Plan - Level 1 - Full Code.       Informed Consent- Anesthetic plan and risks discussed with patient and spouse.  I personally reviewed this patient with the CRNA. Discussed and agreed on the Anesthesia Plan with the CRNA..      NPO Status:  Vitals Value Taken Time   Date of last liquid 05/19/25 05/20/25 06:40   Time of last liquid 2000 05/20/25 06:40   Date of last solid 05/19/25 05/20/25 06:40   Time of last solid 1900 05/20/25 06:40              [1]   Social History  Tobacco Use    Smoking status: Never    Smokeless tobacco: Never   Vaping Use    Vaping status: Never Used   Substance Use Topics    Alcohol use: Yes     Alcohol/week: 3.0 standard drinks of alcohol     Types: 3 Glasses of wine per week     Comment: social    Drug use: No

## 2025-05-20 NOTE — ANESTHESIA POSTPROCEDURE EVALUATION
Post-Op Assessment Note    CV Status:  Stable  Pain Score: 2    Pain management: adequate       Mental Status:  Awake and alert   Hydration Status:  Euvolemic   PONV Controlled:  Controlled   Airway Patency:  Patent     Post Op Vitals Reviewed: Yes    No anethesia notable event occurred.    Staff: CRNA, Anesthesiologist           Last Filed PACU Vitals:  Vitals Value Taken Time   Temp     Pulse 66 05/20/25 09:37   BP     Resp 18 05/20/25 09:37   SpO2 100 % 05/20/25 09:37   Vitals shown include unfiled device data.    Modified Judah:     Vitals Value Taken Time   Activity 2 05/20/25 10:00   Respiration 2 05/20/25 10:00   Circulation 2 05/20/25 10:00   Consciousness 2 05/20/25 10:00   Oxygen Saturation 2 05/20/25 10:00     Modified Judah Score: 10

## 2025-05-20 NOTE — INTERVAL H&P NOTE
H&P reviewed. After examining the patient I find no changes in the patients condition since the H&P had been written.    Vitals:    05/20/25 0646   BP: 139/68   Pulse: 71   Resp: 16   Temp: (!) 97.4 °F (36.3 °C)   SpO2: 98%

## 2025-05-20 NOTE — OP NOTE
OPERATIVE REPORT  PATIENT NAME: Laura David    :  1950  MRN: [id number]  Pt Location: [facility] OR ROOM 02    SURGERY DATE: 2025    Surgeons and Role:     * Kinga Nesbitt MD - Primary     * Aaron Leroy PA-C - Assisting    Preop Diagnosis:  Intraductal papilloma [D36.9]    Post-Op Diagnosis Codes:     * Intraductal papilloma [D36.9]    Procedure(s):  Bilateral - RIGHT AND LEFT BREAST CLARK  DIRECTED LUMPECTOMY    Specimen(s):  ID Type Source Tests Collected by Time Destination   1 : LEFT BREAST CLARK DIRECTED LUMPECTOMY, SUTURE MARKS SHORT SUPERIOR LONG LATERAL Tissue Breast, Left TISSUE EXAM Kinga Nesbitt MD 2025 0831        Estimated Blood Loss:   Minimal    Drains:  * No LDAs found *    Anesthesia Type:   General    Operative Indications:  Intraductal papilloma [D36.9]      Operative Findings:  Clark clips in the specimen.    Right biopsy clips in the specimen.    Left biopsy clip probably dropped on the floor.       Complications:   None    Procedure and Technique:  I previously reviewed the post biopsy images.    Laura David was brought to the operation room and placed under general anesthesia.   Attention was paid to ensure appropriate padding and correct positioning.  The CLARK  detector was then used to locate the position of the CLRAK deflector and the skin was marked in this area.  The  bilateral breast was prepped and draped in a sterile fashion.  I initiated a time-out, identifying the patient, the correct side and the above procedure.  All parties agreed with the time out.      Left Lumpectomy  The planned incision was sharply incised.  The CLARK dectector was used to guide the dissection.  Superior, inferior, medial and lateral planes were developed around the CLARK deflector and the specimen truncated with electrocautery beyond the deflector.  The specimen was then sutured for orientation purposes.  A specimen radiograph was taken in two dimensions.   Specimen was sent to pathology for permanent analysis.  Superficial bleeding controlled with electrocautery and the wound was extensively irrigated.  The wound was closed with two layers, an inner layer of 3-0 vicryl and a subcuticular layer of 4-0 monocryl.  Exofin were applied.  The counts were correct x 2.    Right Lumpectomy:  The planned incision was sharply incised.  The CLARK dectector was used to guide the dissection.  Superior, inferior, medial and lateral planes were developed around the CLARK deflector and the specimen truncated with electrocautery beyond the deflector.  The specimen was then sutured for orientation purposes.  A specimen radiograph was taken in two dimensions.  Specimen was sent to pathology for permanent analysis.  Superficial bleeding controlled with electrocautery and the wound was extensively irrigated.  The wound was closed with two layers, an inner layer of 3-0 vicryl and a subcuticular layer of 4-0 monocryl.  Exofin were applied.  The counts were correct x 2.     I was present for the entire procedure. and A physician assistant was required during the procedure for retraction, tissue handling, dissection and suturing.    Patient Disposition:  PACU     This procedure was not performed to treat breast cancer through sentinel node biopsy       SIGNATURE: Kinga Nesbitt MD  DATE: May 20, 2025  TIME: 9:03 AM

## 2025-05-20 NOTE — ANESTHESIA POSTPROCEDURE EVALUATION
Post-Op Assessment Note    CV Status:  Stable    Pain management: adequate       Mental Status:  Sleepy   Hydration Status:  Euvolemic   PONV Controlled:  Controlled   Airway Patency:  Patent     Post Op Vitals Reviewed: Yes    No anethesia notable event occurred.            Last Filed PACU Vitals:  Vitals Value Taken Time   Temp     Pulse 66 05/20/25 09:37   BP     Resp 18 05/20/25 09:37   SpO2 100 % 05/20/25 09:37   Vitals shown include unfiled device data.

## 2025-05-23 ENCOUNTER — TELEPHONE (OUTPATIENT)
Age: 75
End: 2025-05-23

## 2025-05-23 NOTE — TELEPHONE ENCOUNTER
How does the incision look? WNL    Do you have fever or chills? No    Are you having any pain? No    Do you have a drain(s)? No    Verify post-op appointment date and time  [x]    Do you have any other questions or concerns? No, denies.    **NOTE TO TRIAGER: If patient requires further triage, based upon the answers above, move to appropriate triage protocol.      Patient stated that incision looks WNL.  Patient denies any pain and has not taken anything for pain relief.  Confirmed that patient has number to call, should she need to.

## 2025-06-10 PROBLEM — N64.89 RADIAL SCAR OF BREAST: Status: ACTIVE | Noted: 2025-06-10

## 2025-06-10 PROBLEM — Z98.890 S/P BILATERAL BREAST LUMPECTOMY: Status: ACTIVE | Noted: 2025-06-10

## 2025-06-13 ENCOUNTER — OFFICE VISIT (OUTPATIENT)
Dept: SURGICAL ONCOLOGY | Facility: CLINIC | Age: 75
End: 2025-06-13
Payer: MEDICARE

## 2025-06-13 VITALS
OXYGEN SATURATION: 96 % | WEIGHT: 188 LBS | SYSTOLIC BLOOD PRESSURE: 116 MMHG | HEART RATE: 72 BPM | DIASTOLIC BLOOD PRESSURE: 80 MMHG | HEIGHT: 64 IN | BODY MASS INDEX: 32.1 KG/M2

## 2025-06-13 DIAGNOSIS — N64.89 RADIAL SCAR OF BREAST: ICD-10-CM

## 2025-06-13 DIAGNOSIS — Z12.31 ENCOUNTER FOR SCREENING MAMMOGRAM FOR MALIGNANT NEOPLASM OF BREAST: ICD-10-CM

## 2025-06-13 DIAGNOSIS — D36.9 INTRADUCTAL PAPILLOMA: Primary | ICD-10-CM

## 2025-06-13 DIAGNOSIS — Z98.890 S/P BILATERAL BREAST LUMPECTOMY: ICD-10-CM

## 2025-06-13 PROCEDURE — 99213 OFFICE O/P EST LOW 20 MIN: CPT | Performed by: SURGERY

## 2025-06-13 NOTE — PROGRESS NOTES
"Name: Laura David      : 1950      MRN: [id number]  Encounter Provider: Kinga Nesbitt MD  Encounter Date: 2025   Encounter department: [facility]  :  Assessment & Plan  Intraductal papilloma         Radial scar of breast         S/P bilateral breast lumpectomy    Orders:    Mammo screening bilateral w 3d and cad; Future    Encounter for screening mammogram for malignant neoplasm of breast    Orders:    Mammo screening bilateral w 3d and cad; Future    74-year-old female with bilateral breast lumpectomy for intraductal papilloma.  Pathology was reviewed and discussed.  Well-healed surgical site no evidence of surgical site infections.  Will see her after her next mammogram.  She was told to call us with any questions or concerns in the interim she understand and agreed to do so.        History of Present Illness   Laura David is a 74 y.o. year old female who presents for follow-up with bilateral breast lumpectomy.       Review of Systems   Constitutional:  Negative for chills and fever.   HENT:  Negative for ear pain and sore throat.    Eyes:  Negative for pain and visual disturbance.   Respiratory:  Negative for cough and shortness of breath.    Cardiovascular:  Negative for chest pain and palpitations.   Gastrointestinal:  Negative for abdominal pain and vomiting.   Genitourinary:  Negative for dysuria and hematuria.   Musculoskeletal:  Negative for arthralgias and back pain.   Skin:  Negative for color change and rash.   Neurological:  Negative for seizures and syncope.   All other systems reviewed and are negative.   A complete review of systems is negative other than that noted above in the HPI.    Medical History Reviewed by provider this encounter:     .       Objective   /80 (BP Location: Left arm, Patient Position: Sitting)   Pulse 72   Ht 5' 4\" (1.626 m)   Wt 85.3 kg (188 lb)   SpO2 96%   BMI 32.27 kg/m²     Pain " Screening:     ECOG    Physical Exam  Constitutional:       Appearance: Normal appearance.   HENT:      Head: Normocephalic and atraumatic.      Nose: Nose normal.      Mouth/Throat:      Mouth: Mucous membranes are moist.     Eyes:      Pupils: Pupils are equal, round, and reactive to light.       Cardiovascular:      Rate and Rhythm: Normal rate.      Pulses: Normal pulses.      Heart sounds: Normal heart sounds.   Pulmonary:      Effort: Pulmonary effort is normal.      Breath sounds: Normal breath sounds.   Chest:        Comments: Bilateral breast incisions are healing well.  No swelling no seroma.  No discrete palpable mass or masses  Abdominal:      General: Bowel sounds are normal.      Palpations: Abdomen is soft.     Musculoskeletal:         General: Normal range of motion.      Cervical back: Normal range of motion and neck supple.     Skin:     General: Skin is warm.     Neurological:      General: No focal deficit present.      Mental Status: She is alert and oriented to person, place, and time.     Psychiatric:         Mood and Affect: Mood normal.         Behavior: Behavior normal.         Thought Content: Thought content normal.         Judgment: Judgment normal.          Labs: I have reviewed pertinent labs.   Lab Results   Component Value Date/Time    WBC 6.55 04/24/2025 07:43 AM    RBC 4.32 04/24/2025 07:43 AM    Hemoglobin 13.4 04/24/2025 07:43 AM    Hematocrit 40.4 04/24/2025 07:43 AM    MCV 94 04/24/2025 07:43 AM    MCH 31.0 04/24/2025 07:43 AM    RDW 12.3 04/24/2025 07:43 AM    Platelets 281 04/24/2025 07:43 AM    Segmented % 69 04/24/2025 07:43 AM    Lymphocytes % 16 04/24/2025 07:43 AM    Monocytes % 11 04/24/2025 07:43 AM    Eosinophils Relative 3 04/24/2025 07:43 AM    Basophils Relative 1 04/24/2025 07:43 AM    Immature Grans % 0 04/24/2025 07:43 AM    Absolute Neutrophils 4.56 04/24/2025 07:43 AM      Lab Results   Component Value Date/Time    Sodium 140 04/24/2025 07:43 AM    Potassium  4.5 04/24/2025 07:43 AM    Chloride 105 04/24/2025 07:43 AM    CO2 27 04/24/2025 07:43 AM    ANION GAP 8 04/24/2025 07:43 AM    BUN 17 04/24/2025 07:43 AM    Creatinine 0.74 04/24/2025 07:43 AM    Glucose, Fasting 87 04/24/2025 07:43 AM    Calcium 9.1 04/24/2025 07:43 AM    AST 23 04/24/2025 07:43 AM    ALT 20 04/24/2025 07:43 AM    Alkaline Phosphatase 72 04/24/2025 07:43 AM    Total Protein 6.9 04/24/2025 07:43 AM    Albumin 4.1 04/24/2025 07:43 AM    Total Bilirubin 0.87 04/24/2025 07:43 AM    eGFR 81 04/24/2025 07:43 AM      Admission on 05/20/2025, Discharged on 05/20/2025   Component Date Value Ref Range Status    Case Report 05/20/2025    Final                    Value:Surgical Pathology Report                         Case: [id number]                                  Authorizing Provider:  Kinga Nesbitt,  Collected:           05/20/2025 0831                                     MD                                                                           Ordering Location:     [facility] Received:            05/20/2025 0916                                     Operating Room                                                               Pathologist:           Na Del Valle DO                                                           Specimens:   A) - Breast, Left, LEFT BREAST GRETEL DIRECTED LUMPECTOMY, SUTURE MARKS SHORT SUPERIOR                LONG LATERAL                                                                                        B) - Breast, Right, RIGHT BREAST GRETEL DIRECTED LUMPECTOMY SUTURE MARKS SHORT SUPERIOR               LONG LATERAL                                                                               Final Diagnosis 05/20/2025    Final                    Value:A. Left breast, lumpectomy:  - Residual intraductal papilloma (0.2 cm).   - Cluster of apocrine cysts.   - Biopsy site changes.   - Gretel  reflector is identified.     B. Right breast,  "lumpectomy:  - Multiple intraductal papillomas (measuring up to 0.6 cm).  - Radial scar (0.3 cm).  - Biopsy site changes.   - Gretel  is identified.     - Calcifications associated with breast tissue with no significant pathologic abnormality.        Additional Information 05/20/2025    Final                    Value:All reported additional testing was performed with appropriately reactive controls.  These tests were developed and their performance characteristics determined by [facility] or appropriate performing facility, though some tests may be performed on tissues which have not been validated for performance characteristics (such as staining performed on alcohol exposed cell blocks and decalcified tissues).  Results should be interpreted with caution and in the context of the patients’ clinical condition. These tests may not be cleared or approved by the U.S. Food and Drug Administration, though the FDA has determined that such clearance or approval is not necessary. These tests are used for clinical purposes and they should not be regarded as investigational or for research. This laboratory has been approved by [facility], designated as a high-complexity laboratory and is qualified to perform these tests.    Interpretation performed at [facility], [facility] [facility] [address]       Gross Description 05/20/2025    Final                    Value:A. The specimen is received in formalin, labeled with the patient's name and hospital number, and is designated \" left breast GRETEL directed lumpectomy\".  The specimen consists of 1 tan-yellow portion of fibroadipose tissue measuring 3.4 cm medial to lateral by 2.5 cm superior to inferior by 2.4 cm anterior to posterior.  No skin is identified.  The specimen exhibits 2 sutures which per the op notes, the short suture marks superior and the long suture marks lateral.  The specimen is inked as follows:  " "anterior-green, posterior-black, superior-red, inferior-blue, medial-yellow and lateral-orange.  The specimen is sectioned revealing an ill-defined white-tan gray-pink somewhat palpable rubbery focus measuring 0.5 x 0.5 x 0.4 cm.  This  focus comes within 0.1 cm of superior and lateral margin, 0.5 cm from anterior margin, and is greater than 0.6 cm from all remaining margins.  A Gretel  reflector is identified within this focus.  The remaining cut surface is tan-yellow and fatty.  Some                           of the 7 slices are further sectioned into cassette size fillets.  Entirely submitted. Eleven cassettes.    1: Medial margin, perpendicular sections, slice #1  2-3: Lateral margin with focus showing closest approach, perpendicular sections on sponges, slice #7  4: Uninvolved tissue, slice #2  5: Uninvolved tissue, slice #3  6-7: Uninvolved tissue, complete section, slice #4  8-9: Uninvolved tissue, adjacent/medial to focus, complete section, slice #5  10-11: Focus, Gretel  reflector location, closest approach to superior margin, slice #6    The cold ischemia time based upon information provided by the submitting clinician and receiving staff in the laboratory is within 2 minutes.  B. The specimen is received in formalin, labeled with the patient's name and hospital number, and is designated \" right breast GRETEL directed lumpectomy\".  The specimen consists of 1 tan-yellow portion of fibroadipose tissue measuring 4.5 cm medial to lateral 3.8 cm superior to inferior by 3.5 cm anterior                           to posterior.  The specimen is inked as follows:  anterior-green, posterior-black, superior-red, inferior-blue, medial-yellow and lateral-orange.  No skin is identified.  BU dimension.  The specimen is sectioned revealing 1 ill-defined grayish-yellow somewhat palpable rubbery focus measuring 0.6 x 0.4 x 0.3 cm.  This focus comes within 0.3 cm of posterior margin and is greater than 0.5 cm from all " remaining margins.  A Gretel  reflector is identified within this focus.  The remaining cut surface is tan-yellow and fatty.  The 9 slices are further sectioned into cassette size fillets.  Representative sections. Eighteen cassettes, including entire focus.    1: Medial margin, perpendicularly sectioned on a sponge, portion of slice #1  2-3: Lateral margin, perpendicularly sectioned, portion of slice #  4-7: Uninvolved tissue, adjacent/medial to focus, complete section, with anterior half of the slice in cassettes 4-5, posterior half of the slice in cassettes 6-7, slice #5  8-11: Focus,                           Gretel reflector location, complete section, with anterior half of the slice in cassettes 8-9, posterior half of the slice in cassettes 10-11, slice #6  12-15: Focus, complete section, with anterior half of the slice in cassettes 12-13, posterior half of the slice showing closest approach (and on sponges), in cassettes 14-15, slice #7  16-18: Uninvolved tissue, adjacent/lateral to focus, complete section, slice #8    The cold ischemia time based upon information provided by the submitting clinician and receiving staff in the laboratory is within 1 minute.    Note: The estimated total formalin fixation time based upon information provided by the submitting clinician and the standard processing schedule is approximately 24 hours.  [facility]      Clinical Information 05/20/2025    Final                    Value:LEFT BREAST GRETEL DIRECTED LUMPECTOMY, SUTURE MARKS SHORT SUPERIOR LONG LATERAL     Pathology: I have reviewed pathology reports described above.    Radiology Results Review : No pertinent imaging studies reviewed.  [place]: yes    Administrative Statements   I have spent a total time of 20 minutes in caring for this patient on the day of the visit/encounter including Instructions for management, Impressions, Counseling / Coordination of care, Documenting in the medical record, Reviewing/placing  orders in the medical record (including tests, medications, and/or procedures), and Obtaining or reviewing history  .

## (undated) DEVICE — SUT VICRYL 2-0 SH 27 IN UNDYED J417H

## (undated) DEVICE — BETHLEHEM UNIVERSAL MINOR GEN: Brand: CARDINAL HEALTH

## (undated) DEVICE — PENCIL SMOKE EVAC TELESCOPING W/TUBING

## (undated) DEVICE — INTENDED FOR TISSUE SEPARATION, AND OTHER PROCEDURES THAT REQUIRE A SHARP SURGICAL BLADE TO PUNCTURE OR CUT.: Brand: BARD-PARKER SAFETY BLADES SIZE 15, STERILE

## (undated) DEVICE — GAUZE SPONGES,16 PLY: Brand: CURITY

## (undated) DEVICE — SUT SILK 2-0 SH 30 IN K833H

## (undated) DEVICE — CAUTERY TIP POLISHER: Brand: DEVON

## (undated) DEVICE — SHEATH, GUIDE, SAVI SCOUT®: Brand: SAVI SCOUT®

## (undated) DEVICE — 4-PORT MANIFOLD: Brand: NEPTUNE 2

## (undated) DEVICE — POV-IOD SOLUTION 4OZ BT

## (undated) DEVICE — DRAPE SHEET THREE QUARTER

## (undated) DEVICE — SUT MONOCRYL 4-0 PS-2 18 IN Y496G

## (undated) DEVICE — INSULATED BLADE ELECTRODE: Brand: EDGE

## (undated) DEVICE — DRAPE EQUIPMENT RF WAND

## (undated) DEVICE — ANTIBACTERIAL UNDYED BRAIDED (POLYGLACTIN 910), SYNTHETIC ABSORBABLE SUTURE: Brand: COATED VICRYL

## (undated) DEVICE — GLOVE SRG BIOGEL ORTHOPEDIC 7.5

## (undated) DEVICE — TUBING SUCTION 5MM X 12 FT

## (undated) DEVICE — ADHESIVE SKIN CLOSURE SYS EXOFIN FUSION 22CM